# Patient Record
Sex: FEMALE | Race: WHITE | Employment: OTHER | URBAN - METROPOLITAN AREA
[De-identification: names, ages, dates, MRNs, and addresses within clinical notes are randomized per-mention and may not be internally consistent; named-entity substitution may affect disease eponyms.]

---

## 2017-01-02 LAB
ALBUMIN SERPL-MCNC: 2.8 G/DL
ALP BLD-CCNC: 223 U/L
ALT SERPL-CCNC: 21 U/L
AST SERPL-CCNC: 17 U/L
BASOPHILS ABSOLUTE: NORMAL /ΜL
BASOPHILS RELATIVE PERCENT: 0 %
BILIRUB SERPL-MCNC: 0.5 MG/DL (ref 0.1–1.4)
BUN BLDV-MCNC: 13 MG/DL
CALCIUM SERPL-MCNC: 8.6 MG/DL
CHLORIDE BLD-SCNC: 99 MMOL/L
CHOLESTEROL, TOTAL: 130 MG/DL
CHOLESTEROL/HDL RATIO: NORMAL
CO2: 27 MMOL/L
CREAT SERPL-MCNC: 0.52 MG/DL
EOSINOPHILS ABSOLUTE: NORMAL /ΜL
EOSINOPHILS RELATIVE PERCENT: 2 %
GFR CALCULATED: NORMAL
GLUCOSE BLD-MCNC: 168 MG/DL
HCT VFR BLD CALC: 43 % (ref 36–46)
HDLC SERPL-MCNC: NORMAL MG/DL (ref 35–70)
HEMOGLOBIN: 14 G/DL (ref 12–16)
LDL CHOLESTEROL CALCULATED: NORMAL MG/DL (ref 0–160)
LYMPHOCYTES ABSOLUTE: NORMAL /ΜL
LYMPHOCYTES RELATIVE PERCENT: 23 %
MCH RBC QN AUTO: 31 PG
MCHC RBC AUTO-ENTMCNC: 33 G/DL
MCV RBC AUTO: 93 FL
MONOCYTES ABSOLUTE: NORMAL /ΜL
MONOCYTES RELATIVE PERCENT: 6 %
NEUTROPHILS ABSOLUTE: NORMAL /ΜL
NEUTROPHILS RELATIVE PERCENT: NORMAL %
PDW BLD-RTO: 14.4 %
PLATELET # BLD: 271 K/ΜL
PMV BLD AUTO: NORMAL FL
POTASSIUM SERPL-SCNC: 4.6 MMOL/L
RBC # BLD: 4.58 10^6/ΜL
SODIUM BLD-SCNC: 136 MMOL/L
TOTAL PROTEIN: 7.1
TRIGL SERPL-MCNC: NORMAL MG/DL
VLDLC SERPL CALC-MCNC: NORMAL MG/DL
WBC # BLD: 6.1 10^3/ML

## 2017-01-04 DIAGNOSIS — D50.8 IRON DEFICIENCY ANEMIA DUE TO DIETARY CAUSES: ICD-10-CM

## 2017-01-04 DIAGNOSIS — G40.909 SEIZURE DISORDER (HCC): ICD-10-CM

## 2017-01-04 DIAGNOSIS — E78.00 PURE HYPERCHOLESTEROLEMIA: ICD-10-CM

## 2017-02-08 ENCOUNTER — TELEPHONE (OUTPATIENT)
Dept: INTERNAL MEDICINE CLINIC | Age: 62
End: 2017-02-08

## 2017-02-08 RX ORDER — AMOXICILLIN AND CLAVULANATE POTASSIUM 875; 125 MG/1; MG/1
1 TABLET, FILM COATED ORAL 2 TIMES DAILY WITH MEALS
Qty: 20 TABLET | Refills: 1 | Status: SHIPPED | OUTPATIENT
Start: 2017-02-08 | End: 2018-02-02 | Stop reason: SDUPTHER

## 2017-02-21 ENCOUNTER — OFFICE VISIT (OUTPATIENT)
Dept: INTERNAL MEDICINE CLINIC | Age: 62
End: 2017-02-21

## 2017-02-21 VITALS
SYSTOLIC BLOOD PRESSURE: 100 MMHG | HEART RATE: 68 BPM | DIASTOLIC BLOOD PRESSURE: 60 MMHG | WEIGHT: 120 LBS | HEIGHT: 64 IN | BODY MASS INDEX: 20.49 KG/M2 | OXYGEN SATURATION: 90 %

## 2017-02-21 DIAGNOSIS — Z23 NEED FOR PROPHYLACTIC VACCINATION AGAINST DIPHTHERIA-TETANUS-PERTUSSIS (DTP): Primary | ICD-10-CM

## 2017-02-21 DIAGNOSIS — Z79.899 LONG TERM USE OF DRUG: ICD-10-CM

## 2017-02-21 DIAGNOSIS — D50.8 IRON DEFICIENCY ANEMIA DUE TO DIETARY CAUSES: ICD-10-CM

## 2017-02-21 DIAGNOSIS — G89.4 CHRONIC PAIN SYNDROME: ICD-10-CM

## 2017-02-21 DIAGNOSIS — K21.9 GASTROESOPHAGEAL REFLUX DISEASE WITHOUT ESOPHAGITIS: ICD-10-CM

## 2017-02-21 DIAGNOSIS — R11.0 NAUSEA: ICD-10-CM

## 2017-02-21 DIAGNOSIS — M62.838 MUSCLE SPASMS OF BOTH LOWER EXTREMITIES: ICD-10-CM

## 2017-02-21 DIAGNOSIS — N39.0 RECURRENT UTI: ICD-10-CM

## 2017-02-21 DIAGNOSIS — G82.20 PARAPLEGIA (HCC): ICD-10-CM

## 2017-02-21 DIAGNOSIS — G47.09 OTHER INSOMNIA: ICD-10-CM

## 2017-02-21 DIAGNOSIS — G40.909 SEIZURE DISORDER (HCC): ICD-10-CM

## 2017-02-21 DIAGNOSIS — Z93.1 STATUS POST GASTROSTOMY (HCC): ICD-10-CM

## 2017-02-21 DIAGNOSIS — E78.00 PURE HYPERCHOLESTEROLEMIA: ICD-10-CM

## 2017-02-21 PROCEDURE — G8427 DOCREV CUR MEDS BY ELIG CLIN: HCPCS | Performed by: INTERNAL MEDICINE

## 2017-02-21 PROCEDURE — G8484 FLU IMMUNIZE NO ADMIN: HCPCS | Performed by: INTERNAL MEDICINE

## 2017-02-21 PROCEDURE — 3017F COLORECTAL CA SCREEN DOC REV: CPT | Performed by: INTERNAL MEDICINE

## 2017-02-21 PROCEDURE — 99214 OFFICE O/P EST MOD 30 MIN: CPT | Performed by: INTERNAL MEDICINE

## 2017-02-21 PROCEDURE — 4004F PT TOBACCO SCREEN RCVD TLK: CPT | Performed by: INTERNAL MEDICINE

## 2017-02-21 PROCEDURE — 3014F SCREEN MAMMO DOC REV: CPT | Performed by: INTERNAL MEDICINE

## 2017-02-21 PROCEDURE — G8420 CALC BMI NORM PARAMETERS: HCPCS | Performed by: INTERNAL MEDICINE

## 2017-02-21 RX ORDER — GABAPENTIN 600 MG/1
TABLET ORAL
Qty: 270 TABLET | Refills: 1 | Status: SHIPPED | OUTPATIENT
Start: 2017-02-21 | End: 2017-08-16 | Stop reason: SDUPTHER

## 2017-02-21 RX ORDER — ZOLPIDEM TARTRATE 10 MG/1
10 TABLET ORAL NIGHTLY
Qty: 90 TABLET | Refills: 0 | Status: SHIPPED | OUTPATIENT
Start: 2017-02-21 | End: 2017-08-16 | Stop reason: SDUPTHER

## 2017-02-21 RX ORDER — BACLOFEN 10 MG/1
10 TABLET ORAL 4 TIMES DAILY
Qty: 360 TABLET | Refills: 2 | Status: SHIPPED | OUTPATIENT
Start: 2017-02-21 | End: 2017-08-16 | Stop reason: SDUPTHER

## 2017-02-21 RX ORDER — PROMETHAZINE HYDROCHLORIDE 25 MG/1
25 TABLET ORAL EVERY 8 HOURS PRN
Qty: 270 TABLET | Refills: 1 | Status: SHIPPED | OUTPATIENT
Start: 2017-02-21 | End: 2017-08-16 | Stop reason: SDUPTHER

## 2017-02-21 RX ORDER — FERROUS SULFATE 325(65) MG
TABLET ORAL
Qty: 90 TABLET | Refills: 1 | Status: SHIPPED | OUTPATIENT
Start: 2017-02-21 | End: 2017-08-16 | Stop reason: SDUPTHER

## 2017-02-21 RX ORDER — OXYCODONE HYDROCHLORIDE 15 MG/1
1 TABLET, FILM COATED, EXTENDED RELEASE ORAL EVERY 12 HOURS
Qty: 180 TABLET | Refills: 0 | Status: SHIPPED | OUTPATIENT
Start: 2017-02-21 | End: 2017-05-15 | Stop reason: SDUPTHER

## 2017-02-21 RX ORDER — PRAVASTATIN SODIUM 20 MG
20 TABLET ORAL EVERY EVENING
Qty: 90 TABLET | Refills: 1 | Status: SHIPPED | OUTPATIENT
Start: 2017-02-21 | End: 2017-08-16 | Stop reason: SDUPTHER

## 2017-02-21 RX ORDER — RANITIDINE 150 MG/1
150 TABLET ORAL 2 TIMES DAILY
Qty: 180 TABLET | Refills: 1 | Status: SHIPPED | OUTPATIENT
Start: 2017-02-21 | End: 2017-08-16 | Stop reason: SDUPTHER

## 2017-02-21 RX ORDER — TIZANIDINE 4 MG/1
4 TABLET ORAL 2 TIMES DAILY
Qty: 60 TABLET | Refills: 2 | Status: SHIPPED | OUTPATIENT
Start: 2017-02-21 | End: 2017-06-28 | Stop reason: SDUPTHER

## 2017-02-21 RX ORDER — PHENYTOIN SODIUM 100 MG/1
CAPSULE, EXTENDED RELEASE ORAL
Qty: 270 CAPSULE | Refills: 3 | Status: SHIPPED | OUTPATIENT
Start: 2017-02-21 | End: 2018-02-14 | Stop reason: SDUPTHER

## 2017-02-21 ASSESSMENT — ENCOUNTER SYMPTOMS: BACK PAIN: 1

## 2017-02-25 LAB
6-ACETYLMORPHINE: NOT DETECTED
7-AMINOCLONAZEPAM: NOT DETECTED
ALPHA-OH-ALPRAZOLAM: NOT DETECTED
ALPRAZOLAM: NOT DETECTED
AMPHETAMINE: NOT DETECTED
BARBITURATES: NOT DETECTED
BENZOYLECGONINE: NOT DETECTED
BUPRENORPHINE: NOT DETECTED
CARISOPRODOL: NOT DETECTED
CLONAZEPAM: NOT DETECTED
CODEINE: NOT DETECTED
CREATININE URINE: 23 MG/DL (ref 20–400)
DIAZEPAM: NOT DETECTED
DRUGS EXPECTED: NORMAL
EER PAIN MGT DRUG PANEL, HIGH RES/EMIT U: NORMAL
ETHYL GLUCURONIDE: NOT DETECTED
FENTANYL: NOT DETECTED
HYDROCODONE: NOT DETECTED
HYDROMORPHONE: NOT DETECTED
LORAZEPAM: NOT DETECTED
MARIJUANA METABOLITE: PRESENT
MDA: NOT DETECTED
MDEA: NOT DETECTED
MDMA URINE: NOT DETECTED
MEPERIDINE: NOT DETECTED
METHADONE: NOT DETECTED
METHAMPHETAMINE: NOT DETECTED
METHYLPHENIDATE: NOT DETECTED
MIDAZOLAM: NOT DETECTED
MORPHINE: NOT DETECTED
NORBUPRENORPHINE, FREE: NOT DETECTED
NORDIAZEPAM: PRESENT
NORFENTANYL: NOT DETECTED
NORHYDROCODONE, URINE: NOT DETECTED
NOROXYCODONE: PRESENT
NOROXYMORPHONE, URINE: PRESENT
OXAZEPAM: PRESENT
OXYCODONE: PRESENT
OXYMORPHONE: NOT DETECTED
PAIN MANAGEMENT DRUG PANEL: NORMAL
PAIN MANAGEMENT DRUG PANEL: NORMAL
PCP: NOT DETECTED
PHENTERMINE: NOT DETECTED
PROPOXYPHENE: NOT DETECTED
TAPENTADOL, URINE: NOT DETECTED
TAPENTADOL-O-SULFATE, URINE: NOT DETECTED
TEMAZEPAM: PRESENT
TRAMADOL: NOT DETECTED
ZOLPIDEM: NOT DETECTED

## 2017-03-30 ENCOUNTER — TELEPHONE (OUTPATIENT)
Dept: INTERNAL MEDICINE CLINIC | Age: 62
End: 2017-03-30

## 2017-03-30 RX ORDER — AMOXICILLIN AND CLAVULANATE POTASSIUM 875; 125 MG/1; MG/1
1 TABLET, FILM COATED ORAL 2 TIMES DAILY WITH MEALS
Qty: 20 TABLET | Refills: 0 | Status: SHIPPED | OUTPATIENT
Start: 2017-03-30 | End: 2017-04-09

## 2017-05-15 ENCOUNTER — OFFICE VISIT (OUTPATIENT)
Dept: INTERNAL MEDICINE CLINIC | Age: 62
End: 2017-05-15

## 2017-05-15 VITALS
TEMPERATURE: 98.3 F | DIASTOLIC BLOOD PRESSURE: 60 MMHG | HEIGHT: 64 IN | RESPIRATION RATE: 14 BRPM | OXYGEN SATURATION: 96 % | SYSTOLIC BLOOD PRESSURE: 90 MMHG | HEART RATE: 68 BPM

## 2017-05-15 DIAGNOSIS — G40.909 SEIZURE DISORDER (HCC): ICD-10-CM

## 2017-05-15 DIAGNOSIS — E78.00 PURE HYPERCHOLESTEROLEMIA: ICD-10-CM

## 2017-05-15 DIAGNOSIS — F32.9 REACTIVE DEPRESSION: ICD-10-CM

## 2017-05-15 DIAGNOSIS — N39.0 RECURRENT UTI: Primary | ICD-10-CM

## 2017-05-15 DIAGNOSIS — F17.200 SMOKER: Chronic | ICD-10-CM

## 2017-05-15 DIAGNOSIS — N39.41 URGE INCONTINENCE OF URINE: ICD-10-CM

## 2017-05-15 DIAGNOSIS — G89.4 CHRONIC PAIN SYNDROME: ICD-10-CM

## 2017-05-15 DIAGNOSIS — C21.0 ANAL CARCINOMA (HCC): ICD-10-CM

## 2017-05-15 DIAGNOSIS — G82.20 PARAPLEGIA (HCC): ICD-10-CM

## 2017-05-15 DIAGNOSIS — K21.9 GASTROESOPHAGEAL REFLUX DISEASE WITHOUT ESOPHAGITIS: ICD-10-CM

## 2017-05-15 DIAGNOSIS — Z93.1 S/P PERCUTANEOUS ENDOSCOPIC GASTROSTOMY (PEG) TUBE PLACEMENT (HCC): ICD-10-CM

## 2017-05-15 PROCEDURE — 99214 OFFICE O/P EST MOD 30 MIN: CPT | Performed by: INTERNAL MEDICINE

## 2017-05-15 PROCEDURE — G8420 CALC BMI NORM PARAMETERS: HCPCS | Performed by: INTERNAL MEDICINE

## 2017-05-15 PROCEDURE — 3014F SCREEN MAMMO DOC REV: CPT | Performed by: INTERNAL MEDICINE

## 2017-05-15 PROCEDURE — 3017F COLORECTAL CA SCREEN DOC REV: CPT | Performed by: INTERNAL MEDICINE

## 2017-05-15 PROCEDURE — 4004F PT TOBACCO SCREEN RCVD TLK: CPT | Performed by: INTERNAL MEDICINE

## 2017-05-15 PROCEDURE — G8427 DOCREV CUR MEDS BY ELIG CLIN: HCPCS | Performed by: INTERNAL MEDICINE

## 2017-05-15 RX ORDER — OXYCODONE HYDROCHLORIDE 15 MG/1
1 TABLET, FILM COATED, EXTENDED RELEASE ORAL EVERY 12 HOURS
Qty: 180 TABLET | Refills: 0 | Status: SHIPPED | OUTPATIENT
Start: 2017-05-15 | End: 2017-08-16 | Stop reason: SDUPTHER

## 2017-05-15 RX ORDER — AMOXICILLIN AND CLAVULANATE POTASSIUM 875; 125 MG/1; MG/1
1 TABLET, FILM COATED ORAL 2 TIMES DAILY WITH MEALS
Qty: 20 TABLET | Refills: 1 | Status: SHIPPED | OUTPATIENT
Start: 2017-05-15 | End: 2017-08-16 | Stop reason: SDUPTHER

## 2017-05-15 ASSESSMENT — PATIENT HEALTH QUESTIONNAIRE - PHQ9
2. FEELING DOWN, DEPRESSED OR HOPELESS: 1
1. LITTLE INTEREST OR PLEASURE IN DOING THINGS: 1
SUM OF ALL RESPONSES TO PHQ9 QUESTIONS 1 & 2: 2
SUM OF ALL RESPONSES TO PHQ QUESTIONS 1-9: 2

## 2017-05-15 ASSESSMENT — ENCOUNTER SYMPTOMS
COUGH: 0
BACK PAIN: 1

## 2017-06-25 DIAGNOSIS — G89.4 CHRONIC PAIN SYNDROME: ICD-10-CM

## 2017-06-25 RX ORDER — TIZANIDINE 4 MG/1
TABLET ORAL
Qty: 60 TABLET | Refills: 1 | Status: CANCELLED | OUTPATIENT
Start: 2017-06-25

## 2017-06-28 DIAGNOSIS — G89.4 CHRONIC PAIN SYNDROME: ICD-10-CM

## 2017-06-30 RX ORDER — TIZANIDINE 4 MG/1
4 TABLET ORAL 2 TIMES DAILY
Qty: 60 TABLET | Refills: 2 | Status: SHIPPED | OUTPATIENT
Start: 2017-06-30 | End: 2017-08-16 | Stop reason: SDUPTHER

## 2017-08-01 ENCOUNTER — TELEPHONE (OUTPATIENT)
Dept: FAMILY MEDICINE CLINIC | Age: 62
End: 2017-08-01

## 2017-08-16 ENCOUNTER — OFFICE VISIT (OUTPATIENT)
Dept: FAMILY MEDICINE CLINIC | Age: 62
End: 2017-08-16

## 2017-08-16 VITALS
HEIGHT: 64 IN | SYSTOLIC BLOOD PRESSURE: 78 MMHG | RESPIRATION RATE: 16 BRPM | HEART RATE: 71 BPM | OXYGEN SATURATION: 98 % | DIASTOLIC BLOOD PRESSURE: 58 MMHG

## 2017-08-16 DIAGNOSIS — D50.8 IRON DEFICIENCY ANEMIA DUE TO DIETARY CAUSES: ICD-10-CM

## 2017-08-16 DIAGNOSIS — G82.20 PARAPLEGIA (HCC): ICD-10-CM

## 2017-08-16 DIAGNOSIS — N39.0 RECURRENT UTI: ICD-10-CM

## 2017-08-16 DIAGNOSIS — F51.01 PRIMARY INSOMNIA: ICD-10-CM

## 2017-08-16 DIAGNOSIS — K21.9 GASTROESOPHAGEAL REFLUX DISEASE WITHOUT ESOPHAGITIS: ICD-10-CM

## 2017-08-16 DIAGNOSIS — G89.4 CHRONIC PAIN SYNDROME: ICD-10-CM

## 2017-08-16 DIAGNOSIS — G40.909 SEIZURE DISORDER (HCC): Primary | ICD-10-CM

## 2017-08-16 DIAGNOSIS — M62.838 MUSCLE SPASMS OF BOTH LOWER EXTREMITIES: ICD-10-CM

## 2017-08-16 DIAGNOSIS — F17.200 SMOKER UNMOTIVATED TO QUIT: ICD-10-CM

## 2017-08-16 DIAGNOSIS — F32.A DEPRESSION, UNSPECIFIED DEPRESSION TYPE: ICD-10-CM

## 2017-08-16 DIAGNOSIS — R11.0 NAUSEA: ICD-10-CM

## 2017-08-16 DIAGNOSIS — E78.00 PURE HYPERCHOLESTEROLEMIA: ICD-10-CM

## 2017-08-16 PROCEDURE — 3014F SCREEN MAMMO DOC REV: CPT | Performed by: INTERNAL MEDICINE

## 2017-08-16 PROCEDURE — 3017F COLORECTAL CA SCREEN DOC REV: CPT | Performed by: INTERNAL MEDICINE

## 2017-08-16 PROCEDURE — G8420 CALC BMI NORM PARAMETERS: HCPCS | Performed by: INTERNAL MEDICINE

## 2017-08-16 PROCEDURE — 99214 OFFICE O/P EST MOD 30 MIN: CPT | Performed by: INTERNAL MEDICINE

## 2017-08-16 PROCEDURE — 4004F PT TOBACCO SCREEN RCVD TLK: CPT | Performed by: INTERNAL MEDICINE

## 2017-08-16 PROCEDURE — G8427 DOCREV CUR MEDS BY ELIG CLIN: HCPCS | Performed by: INTERNAL MEDICINE

## 2017-08-16 RX ORDER — PROMETHAZINE HYDROCHLORIDE 25 MG/1
25 TABLET ORAL EVERY 8 HOURS PRN
Qty: 270 TABLET | Refills: 1 | Status: SHIPPED | OUTPATIENT
Start: 2017-08-16 | End: 2018-02-14 | Stop reason: SDUPTHER

## 2017-08-16 RX ORDER — RANITIDINE 150 MG/1
150 TABLET ORAL 2 TIMES DAILY
Qty: 180 TABLET | Refills: 1 | Status: SHIPPED | OUTPATIENT
Start: 2017-08-16 | End: 2018-02-14 | Stop reason: SDUPTHER

## 2017-08-16 RX ORDER — OXYCODONE HYDROCHLORIDE 15 MG/1
1 TABLET, FILM COATED, EXTENDED RELEASE ORAL EVERY 12 HOURS
Qty: 180 TABLET | Refills: 0 | Status: SHIPPED | OUTPATIENT
Start: 2017-08-16 | End: 2017-08-16 | Stop reason: SDUPTHER

## 2017-08-16 RX ORDER — FOLIC ACID 1 MG/1
1 TABLET ORAL DAILY
Qty: 90 TABLET | Refills: 1 | Status: SHIPPED | OUTPATIENT
Start: 2017-08-16 | End: 2018-02-14 | Stop reason: SDUPTHER

## 2017-08-16 RX ORDER — BACLOFEN 10 MG/1
10 TABLET ORAL 4 TIMES DAILY
Qty: 360 TABLET | Refills: 2 | Status: SHIPPED | OUTPATIENT
Start: 2017-08-16 | End: 2018-01-01 | Stop reason: SDUPTHER

## 2017-08-16 RX ORDER — AMOXICILLIN AND CLAVULANATE POTASSIUM 875; 125 MG/1; MG/1
1 TABLET, FILM COATED ORAL 2 TIMES DAILY WITH MEALS
Qty: 20 TABLET | Refills: 2 | Status: SHIPPED | OUTPATIENT
Start: 2017-08-16 | End: 2017-08-26

## 2017-08-16 RX ORDER — OXYCODONE HYDROCHLORIDE 15 MG/1
1 TABLET, FILM COATED, EXTENDED RELEASE ORAL EVERY 12 HOURS
Qty: 180 TABLET | Refills: 0 | Status: SHIPPED | OUTPATIENT
Start: 2017-08-16 | End: 2017-11-15 | Stop reason: SDUPTHER

## 2017-08-16 RX ORDER — FERROUS SULFATE 325(65) MG
TABLET ORAL
Qty: 90 TABLET | Refills: 1 | Status: SHIPPED | OUTPATIENT
Start: 2017-08-16 | End: 2018-02-14 | Stop reason: SDUPTHER

## 2017-08-16 RX ORDER — GABAPENTIN 600 MG/1
TABLET ORAL
Qty: 270 TABLET | Refills: 0 | Status: SHIPPED | OUTPATIENT
Start: 2017-08-16 | End: 2017-11-15 | Stop reason: SDUPTHER

## 2017-08-16 RX ORDER — PRAVASTATIN SODIUM 20 MG
20 TABLET ORAL EVERY EVENING
Qty: 90 TABLET | Refills: 1 | Status: SHIPPED | OUTPATIENT
Start: 2017-08-16 | End: 2018-02-14 | Stop reason: SDUPTHER

## 2017-08-16 RX ORDER — TIZANIDINE 4 MG/1
4 TABLET ORAL 2 TIMES DAILY
Qty: 60 TABLET | Refills: 2 | Status: SHIPPED | OUTPATIENT
Start: 2017-08-16 | End: 2017-11-15 | Stop reason: SDUPTHER

## 2017-08-16 RX ORDER — FLUOXETINE HYDROCHLORIDE 40 MG/1
40 CAPSULE ORAL DAILY
Qty: 90 CAPSULE | Refills: 1 | Status: SHIPPED | OUTPATIENT
Start: 2017-08-16 | End: 2017-11-15

## 2017-08-16 RX ORDER — ZOLPIDEM TARTRATE 10 MG/1
10 TABLET ORAL NIGHTLY
Qty: 90 TABLET | Refills: 0 | Status: SHIPPED | OUTPATIENT
Start: 2017-08-16 | End: 2017-11-15 | Stop reason: SDUPTHER

## 2017-08-16 ASSESSMENT — ENCOUNTER SYMPTOMS
BACK PAIN: 1
COUGH: 0

## 2017-08-22 ENCOUNTER — TELEPHONE (OUTPATIENT)
Dept: FAMILY MEDICINE CLINIC | Age: 62
End: 2017-08-22

## 2017-09-19 ENCOUNTER — TELEPHONE (OUTPATIENT)
Dept: FAMILY MEDICINE CLINIC | Age: 62
End: 2017-09-19

## 2017-10-16 PROCEDURE — G0179 MD RECERTIFICATION HHA PT: HCPCS | Performed by: INTERNAL MEDICINE

## 2017-11-06 ENCOUNTER — TELEPHONE (OUTPATIENT)
Dept: FAMILY MEDICINE CLINIC | Age: 62
End: 2017-11-06

## 2017-11-06 DIAGNOSIS — S31.811D LACERATION OF RIGHT BUTTOCK WITHOUT FOREIGN BODY, SUBSEQUENT ENCOUNTER: Primary | ICD-10-CM

## 2017-11-09 ENCOUNTER — TELEPHONE (OUTPATIENT)
Dept: FAMILY MEDICINE CLINIC | Age: 62
End: 2017-11-09

## 2017-11-09 DIAGNOSIS — C21.0 ANAL CARCINOMA (HCC): ICD-10-CM

## 2017-11-09 DIAGNOSIS — Z93.1 S/P PERCUTANEOUS ENDOSCOPIC GASTROSTOMY (PEG) TUBE PLACEMENT (HCC): ICD-10-CM

## 2017-11-09 DIAGNOSIS — G82.20 PARAPLEGIA (HCC): Primary | ICD-10-CM

## 2017-11-09 DIAGNOSIS — G89.4 CHRONIC PAIN SYNDROME: ICD-10-CM

## 2017-11-09 DIAGNOSIS — G40.909 SEIZURE DISORDER (HCC): ICD-10-CM

## 2017-11-09 NOTE — TELEPHONE ENCOUNTER
Marco Antonio patient's spouse states patient needs new electric adjustable  hospital bed with air mattress. He would like a script printed out and he will  script.

## 2017-11-15 ENCOUNTER — OFFICE VISIT (OUTPATIENT)
Dept: FAMILY MEDICINE CLINIC | Age: 62
End: 2017-11-15

## 2017-11-15 VITALS
RESPIRATION RATE: 16 BRPM | SYSTOLIC BLOOD PRESSURE: 64 MMHG | HEART RATE: 68 BPM | TEMPERATURE: 97.8 F | HEIGHT: 64 IN | DIASTOLIC BLOOD PRESSURE: 42 MMHG | OXYGEN SATURATION: 96 %

## 2017-11-15 DIAGNOSIS — Z00.00 ROUTINE GENERAL MEDICAL EXAMINATION AT A HEALTH CARE FACILITY: Primary | ICD-10-CM

## 2017-11-15 DIAGNOSIS — G40.909 SEIZURE DISORDER (HCC): ICD-10-CM

## 2017-11-15 DIAGNOSIS — G82.20 PARAPLEGIA (HCC): ICD-10-CM

## 2017-11-15 DIAGNOSIS — Z23 NEED FOR INFLUENZA VACCINATION: ICD-10-CM

## 2017-11-15 DIAGNOSIS — F32.9 REACTIVE DEPRESSION: ICD-10-CM

## 2017-11-15 DIAGNOSIS — M62.838 MUSCLE SPASMS OF BOTH LOWER EXTREMITIES: ICD-10-CM

## 2017-11-15 DIAGNOSIS — N39.42 URINARY INCONTINENCE WITHOUT SENSORY AWARENESS: ICD-10-CM

## 2017-11-15 DIAGNOSIS — F39 MOOD DISORDER (HCC): ICD-10-CM

## 2017-11-15 DIAGNOSIS — Z87.891 PERSONAL HISTORY OF TOBACCO USE: ICD-10-CM

## 2017-11-15 DIAGNOSIS — N39.0 RECURRENT UTI: ICD-10-CM

## 2017-11-15 DIAGNOSIS — F17.210 NICOTINE DEPENDENCE, CIGARETTES, UNCOMPLICATED: ICD-10-CM

## 2017-11-15 DIAGNOSIS — G89.4 CHRONIC PAIN SYNDROME: ICD-10-CM

## 2017-11-15 DIAGNOSIS — F31.0 BIPOLAR AFFECTIVE DISORDER, CURRENT EPISODE HYPOMANIC (HCC): ICD-10-CM

## 2017-11-15 DIAGNOSIS — K21.9 GASTROESOPHAGEAL REFLUX DISEASE WITHOUT ESOPHAGITIS: ICD-10-CM

## 2017-11-15 DIAGNOSIS — F51.01 PRIMARY INSOMNIA: ICD-10-CM

## 2017-11-15 DIAGNOSIS — C21.0 ANAL CARCINOMA (HCC): ICD-10-CM

## 2017-11-15 DIAGNOSIS — Z93.1 S/P PERCUTANEOUS ENDOSCOPIC GASTROSTOMY (PEG) TUBE PLACEMENT (HCC): ICD-10-CM

## 2017-11-15 PROCEDURE — 90686 IIV4 VACC NO PRSV 0.5 ML IM: CPT | Performed by: INTERNAL MEDICINE

## 2017-11-15 PROCEDURE — G0444 DEPRESSION SCREEN ANNUAL: HCPCS | Performed by: INTERNAL MEDICINE

## 2017-11-15 PROCEDURE — G0008 ADMIN INFLUENZA VIRUS VAC: HCPCS | Performed by: INTERNAL MEDICINE

## 2017-11-15 PROCEDURE — G0439 PPPS, SUBSEQ VISIT: HCPCS | Performed by: INTERNAL MEDICINE

## 2017-11-15 RX ORDER — TIZANIDINE 4 MG/1
4 TABLET ORAL 2 TIMES DAILY
Qty: 180 TABLET | Refills: 0 | Status: SHIPPED | OUTPATIENT
Start: 2017-11-15 | End: 2018-02-14 | Stop reason: SDUPTHER

## 2017-11-15 RX ORDER — GABAPENTIN 600 MG/1
TABLET ORAL
Qty: 270 TABLET | Refills: 0 | Status: SHIPPED | OUTPATIENT
Start: 2017-11-15 | End: 2018-02-14 | Stop reason: SDUPTHER

## 2017-11-15 RX ORDER — ZOLPIDEM TARTRATE 10 MG/1
10 TABLET ORAL NIGHTLY
Qty: 90 TABLET | Refills: 0 | Status: SHIPPED | OUTPATIENT
Start: 2017-11-15 | End: 2018-02-14 | Stop reason: SDUPTHER

## 2017-11-15 RX ORDER — BACLOFEN 10 MG/1
10 TABLET ORAL 4 TIMES DAILY
Qty: 360 TABLET | Refills: 2 | Status: CANCELLED | OUTPATIENT
Start: 2017-11-15

## 2017-11-15 RX ORDER — AMOXICILLIN AND CLAVULANATE POTASSIUM 875; 125 MG/1; MG/1
1 TABLET, FILM COATED ORAL 2 TIMES DAILY WITH MEALS
Qty: 20 TABLET | Refills: 2 | Status: SHIPPED | OUTPATIENT
Start: 2017-11-15 | End: 2017-11-25

## 2017-11-15 RX ORDER — OXYCODONE HYDROCHLORIDE 15 MG/1
1 TABLET, FILM COATED, EXTENDED RELEASE ORAL EVERY 12 HOURS
Qty: 180 TABLET | Refills: 0 | Status: SHIPPED | OUTPATIENT
Start: 2017-11-15 | End: 2018-02-14 | Stop reason: SDUPTHER

## 2017-11-15 RX ORDER — PHENYTOIN SODIUM 100 MG/1
CAPSULE, EXTENDED RELEASE ORAL
Qty: 270 CAPSULE | Refills: 3 | Status: CANCELLED | OUTPATIENT
Start: 2017-11-15

## 2017-11-15 ASSESSMENT — ANXIETY QUESTIONNAIRES: GAD7 TOTAL SCORE: 3

## 2017-11-15 ASSESSMENT — LIFESTYLE VARIABLES
HOW OFTEN DURING THE LAST YEAR HAVE YOU FAILED TO DO WHAT WAS NORMALLY EXPECTED FROM YOU BECAUSE OF DRINKING: 0
AUDIT TOTAL SCORE: 2
HOW OFTEN DO YOU HAVE A DRINK CONTAINING ALCOHOL: 1
HOW OFTEN DO YOU HAVE SIX OR MORE DRINKS ON ONE OCCASION: 0
HOW MANY STANDARD DRINKS CONTAINING ALCOHOL DO YOU HAVE ON A TYPICAL DAY: 1
HAS A RELATIVE, FRIEND, DOCTOR, OR ANOTHER HEALTH PROFESSIONAL EXPRESSED CONCERN ABOUT YOUR DRINKING OR SUGGESTED YOU CUT DOWN: 0
HOW OFTEN DURING THE LAST YEAR HAVE YOU HAD A FEELING OF GUILT OR REMORSE AFTER DRINKING: 0
AUDIT-C TOTAL SCORE: 2
HOW OFTEN DURING THE LAST YEAR HAVE YOU BEEN UNABLE TO REMEMBER WHAT HAPPENED THE NIGHT BEFORE BECAUSE YOU HAD BEEN DRINKING: 0
HAVE YOU OR SOMEONE ELSE BEEN INJURED AS A RESULT OF YOUR DRINKING: 0
HOW OFTEN DURING THE LAST YEAR HAVE YOU NEEDED AN ALCOHOLIC DRINK FIRST THING IN THE MORNING TO GET YOURSELF GOING AFTER A NIGHT OF HEAVY DRINKING: 0
HOW OFTEN DURING THE LAST YEAR HAVE YOU FOUND THAT YOU WERE NOT ABLE TO STOP DRINKING ONCE YOU HAD STARTED: 0

## 2017-11-15 NOTE — PATIENT INSTRUCTIONS
Advance Directives: Care Instructions  Your Care Instructions  An advance directive is a legal way to state your wishes at the end of your life. It tells your family and your doctor what to do if you can no longer say what you want. There are two main types of advance directives. You can change them any time that your wishes change. · A living will tells your family and your doctor your wishes about life support and other treatment. · A durable power of  for health care lets you name a person to make treatment decisions for you when you can't speak for yourself. This person is called a health care agent. If you do not have an advance directive, decisions about your medical care may be made by a doctor or a  who doesn't know you. It may help to think of an advance directive as a gift to the people who care for you. If you have one, they won't have to make tough decisions by themselves. Follow-up care is a key part of your treatment and safety. Be sure to make and go to all appointments, and call your doctor if you are having problems. It's also a good idea to know your test results and keep a list of the medicines you take. How can you care for yourself at home? · Discuss your wishes with your loved ones and your doctor. This way, there are no surprises. · Many states have a unique form. Or you might use a universal form that has been approved by many states. This kind of form can sometimes be completed and stored online. Your electronic copy will then be available wherever you have a connection to the Internet. In most cases, doctors will respect your wishes even if you have a form from a different state. · You don't need a  to do an advance directive. But you may want to get legal advice. · Think about these questions when you prepare an advance directive:  ¨ Who do you want to make decisions about your medical care if you are not able to?  Many people choose a family member or close friend. ¨ Do you know enough about life support methods that might be used? If not, talk to your doctor so you understand. ¨ What are you most afraid of that might happen? You might be afraid of having pain, losing your independence, or being kept alive by machines. ¨ Where would you prefer to die? Choices include your home, a hospital, or a nursing home. ¨ Would you like to have information about hospice care to support you and your family? ¨ Do you want to donate organs when you die? ¨ Do you want certain Gnosticist practices performed before you die? If so, put your wishes in the advance directive. · Read your advance directive every year, and make changes as needed. When should you call for help? Be sure to contact your doctor if you have any questions. Where can you learn more? Go to [email or web address] and sign in to your [employer] account. Enter R264 in the MadeClose box to learn more about \"Advance Directives: Care Instructions. \"     If you do not have an account, please click on the \"Sign Up Now\" link. Current as of: November 17, 2016  Content Version: 11.3  © 0862-9379 [employer]. Care instructions adapted under license by [facility] ([facility]). If you have questions about a medical condition or this instruction, always ask your healthcare professional. [address] any warranty or liability for your use of this information. Learning About Durable Power of  for Health Care  What is a durable power of  for health care? A durable power of  for health care is one type of the legal forms called advance directives. It lets you decide who you want to make treatment decisions for you if you cannot speak or decide for yourself. The person you choose is called your health care agent. Another type of advance directive is a living will.  It lets you write down what kinds of treatment or life support you want or do not want. What should you think about when choosing a health care agent? Choose your health care agent carefully. This person may or may not be a family member. Talk to the person before you make your final decision. Make sure he or she is comfortable with this responsibility. It's a good idea to choose someone who:  · Is at least 25years old. · Knows you well and understands what makes life meaningful for you. · Understands your Sabianist and moral values. · Will do what you want, not what he or she wants. · Will be able to make difficult choices at a stressful time. · Will be able to refuse or stop treatment, if that is what you would want, even if you could die. · Will be firm and confident with health professionals if needed. · Will ask questions to get necessary information. · Lives near you or agrees to travel to you if needed. Your family may help you make medical decisions while you can still be part of that process. But it is important to choose one person to be your health care agent in case you are not able to make decisions for yourself. If you don't fill out the legal form and name a health care agent, the decisions your family can make may be limited. Who will make decisions for you if you do not have a health care agent? If you don't have a health care agent or a living will, your family members may disagree about your medical care. And then some medical professionals who may not know you as well might have to make decisions for you. In some cases, a  makes the decisions. When you name a health care agent, it is very clear who has the power to make health decisions for you. How do you name a health care agent? You name your health care agent on a legal form. It is usually called a durable power of  for health care. Ask your hospital, state bar association, or office on aging where to find these forms.   You must sign the form to make it legal. Some states require There are many tools that people use to quit smoking. You may find that combining tools works best for you. There are several steps to quitting. First you get ready to quit. Then you get support to help you. After that, you learn new skills and behaviors to become a nonsmoker. For many people, a necessary step is getting and using medicine. Your doctor will help you set up the plan that best meets your needs. You may want to attend a smoking cessation program to help you quit smoking. When you choose a program, look for one that has proven success. Ask your doctor for ideas. You will greatly increase your chances of success if you take medicine as well as get counseling or join a cessation program.  Some of the changes you feel when you first quit tobacco are uncomfortable. Your body will miss the nicotine at first, and you may feel short-tempered and grumpy. You may have trouble sleeping or concentrating. Medicine can help you deal with these symptoms. You may struggle with changing your smoking habits and rituals. The last step is the tricky one: Be prepared for the smoking urge to continue for a time. This is a lot to deal with, but keep at it. You will feel better. Follow-up care is a key part of your treatment and safety. Be sure to make and go to all appointments, and call your doctor if you are having problems. Its also a good idea to know your test results and keep a list of the medicines you take. How can you care for yourself at home? · Ask your family, friends, and coworkers for support. You have a better chance of quitting if you have help and support. · Join a support group, such as Nicotine Anonymous, for people who are trying to quit smoking. · Consider signing up for a smoking cessation program, such as the American Lung Association's Freedom from Smoking program.  · Set a quit date. Pick your date carefully so that it is not right in the middle of a big deadline or stressful time.  Once you requirement with diet alone, but a vitamin D supplement is usually necessary to meet this goal.  · When exposed to the sun, use a sunscreen that protects against both UVA and UVB radiation with an SPF of 30 or greater. Reapply every 2 to 3 hours or after sweating, drying off with a towel, or swimming. · Always wear a seat belt when traveling in a car. Always wear a helmet when riding a bicycle or motorcycle. Patient Education        Well Visit, Women 48 to 72: Care Instructions  Your Care Instructions  Physical exams can help you stay healthy. Your doctor has checked your overall health and may have suggested ways to take good care of yourself. He or she also may have recommended tests. At home, you can help prevent illness with healthy eating, regular exercise, and other steps. Follow-up care is a key part of your treatment and safety. Be sure to make and go to all appointments, and call your doctor if you are having problems. It's also a good idea to know your test results and keep a list of the medicines you take. How can you care for yourself at home? Reach and stay at a healthy weight. This will lower your risk for many problems, such as obesity, diabetes, heart disease, and high blood pressure. Get at least 30 minutes of exercise on most days of the week. Walking is a good choice. You also may want to do other activities, such as running, swimming, cycling, or playing tennis or team sports. Do not smoke. Smoking can make health problems worse. If you need help quitting, talk to your doctor about stop-smoking programs and medicines. These can increase your chances of quitting for good. Protect your skin from too much sun. When you're outdoors from 10 a.m. to 4 p.m., stay in the shade or cover up with clothing and a hat with a wide brim. Wear sunglasses that block UV rays. Even when it's cloudy, put broad-spectrum sunscreen (SPF 30 or higher) on any exposed skin.   See a dentist one or two times a year
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
## 2017-12-28 ENCOUNTER — TELEPHONE (OUTPATIENT)
Dept: FAMILY MEDICINE CLINIC | Age: 62
End: 2017-12-28
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
## 2018-01-01 ENCOUNTER — OFFICE VISIT (OUTPATIENT)
Dept: FAMILY MEDICINE CLINIC | Age: 63
End: 2018-01-01
Payer: MEDICARE

## 2018-01-01 ENCOUNTER — TELEPHONE (OUTPATIENT)
Dept: FAMILY MEDICINE CLINIC | Age: 63
End: 2018-01-01

## 2018-01-01 VITALS
BODY MASS INDEX: 18.44 KG/M2 | OXYGEN SATURATION: 99 % | HEART RATE: 76 BPM | HEIGHT: 64 IN | SYSTOLIC BLOOD PRESSURE: 108 MMHG | WEIGHT: 108 LBS | DIASTOLIC BLOOD PRESSURE: 64 MMHG

## 2018-01-01 DIAGNOSIS — F51.01 PRIMARY INSOMNIA: ICD-10-CM

## 2018-01-01 DIAGNOSIS — N39.0 RECURRENT UTI: Primary | ICD-10-CM

## 2018-01-01 DIAGNOSIS — J20.9 ACUTE BRONCHITIS, UNSPECIFIED ORGANISM: Primary | ICD-10-CM

## 2018-01-01 DIAGNOSIS — M62.838 MUSCLE SPASM: ICD-10-CM

## 2018-01-01 DIAGNOSIS — Z23 FLU VACCINE NEED: ICD-10-CM

## 2018-01-01 DIAGNOSIS — E78.00 PURE HYPERCHOLESTEROLEMIA: ICD-10-CM

## 2018-01-01 DIAGNOSIS — G40.909 SEIZURE DISORDER (HCC): ICD-10-CM

## 2018-01-01 DIAGNOSIS — M62.838 MUSCLE SPASMS OF BOTH LOWER EXTREMITIES: ICD-10-CM

## 2018-01-01 DIAGNOSIS — G89.4 CHRONIC PAIN SYNDROME: ICD-10-CM

## 2018-01-01 DIAGNOSIS — R11.0 NAUSEA: ICD-10-CM

## 2018-01-01 PROCEDURE — 90662 IIV NO PRSV INCREASED AG IM: CPT | Performed by: INTERNAL MEDICINE

## 2018-01-01 PROCEDURE — 1101F PT FALLS ASSESS-DOCD LE1/YR: CPT | Performed by: INTERNAL MEDICINE

## 2018-01-01 PROCEDURE — 4040F PNEUMOC VAC/ADMIN/RCVD: CPT | Performed by: INTERNAL MEDICINE

## 2018-01-01 PROCEDURE — G8400 PT W/DXA NO RESULTS DOC: HCPCS | Performed by: INTERNAL MEDICINE

## 2018-01-01 PROCEDURE — G8420 CALC BMI NORM PARAMETERS: HCPCS | Performed by: INTERNAL MEDICINE

## 2018-01-01 PROCEDURE — 1090F PRES/ABSN URINE INCON ASSESS: CPT | Performed by: INTERNAL MEDICINE

## 2018-01-01 PROCEDURE — G0008 ADMIN INFLUENZA VIRUS VAC: HCPCS | Performed by: INTERNAL MEDICINE

## 2018-01-01 PROCEDURE — G8482 FLU IMMUNIZE ORDER/ADMIN: HCPCS | Performed by: INTERNAL MEDICINE

## 2018-01-01 PROCEDURE — 1123F ACP DISCUSS/DSCN MKR DOCD: CPT | Performed by: INTERNAL MEDICINE

## 2018-01-01 PROCEDURE — 4004F PT TOBACCO SCREEN RCVD TLK: CPT | Performed by: INTERNAL MEDICINE

## 2018-01-01 PROCEDURE — 99214 OFFICE O/P EST MOD 30 MIN: CPT | Performed by: INTERNAL MEDICINE

## 2018-01-01 PROCEDURE — G8427 DOCREV CUR MEDS BY ELIG CLIN: HCPCS | Performed by: INTERNAL MEDICINE

## 2018-01-01 PROCEDURE — 3017F COLORECTAL CA SCREEN DOC REV: CPT | Performed by: INTERNAL MEDICINE

## 2018-01-01 RX ORDER — ZOLPIDEM TARTRATE 10 MG/1
TABLET ORAL
Qty: 30 TABLET | Refills: 0 | Status: SHIPPED | OUTPATIENT
Start: 2018-01-01 | End: 2019-01-01

## 2018-01-01 RX ORDER — ZOLPIDEM TARTRATE 10 MG/1
TABLET ORAL
Qty: 30 TABLET | Refills: 0 | Status: SHIPPED | OUTPATIENT
Start: 2018-01-01 | End: 2018-01-01 | Stop reason: SDUPTHER

## 2018-01-01 RX ORDER — TIZANIDINE 4 MG/1
4 TABLET ORAL 2 TIMES DAILY
Qty: 180 TABLET | Refills: 1 | Status: SHIPPED | OUTPATIENT
Start: 2018-01-01 | End: 2019-01-01 | Stop reason: SDUPTHER

## 2018-01-01 RX ORDER — ZOLPIDEM TARTRATE 10 MG/1
10 TABLET ORAL NIGHTLY
Qty: 90 TABLET | Refills: 0 | OUTPATIENT
Start: 2018-01-01 | End: 2019-01-01

## 2018-01-01 RX ORDER — BACLOFEN 10 MG/1
TABLET ORAL
Qty: 120 TABLET | Refills: 0 | Status: SHIPPED | OUTPATIENT
Start: 2018-01-01 | End: 2019-01-01 | Stop reason: DRUGHIGH

## 2018-01-01 RX ORDER — AMOXICILLIN AND CLAVULANATE POTASSIUM 875; 125 MG/1; MG/1
1 TABLET, FILM COATED ORAL 2 TIMES DAILY WITH MEALS
Qty: 20 TABLET | Refills: 2 | Status: SHIPPED | OUTPATIENT
Start: 2018-01-01 | End: 2018-01-01

## 2018-01-01 RX ORDER — PROMETHAZINE HYDROCHLORIDE 25 MG/1
TABLET ORAL
Qty: 270 TABLET | Refills: 0 | Status: ON HOLD | OUTPATIENT
Start: 2018-01-01 | End: 2019-01-01 | Stop reason: SDUPTHER

## 2018-01-01 RX ORDER — PROMETHAZINE HYDROCHLORIDE 25 MG/1
TABLET ORAL
Qty: 270 TABLET | Refills: 0 | Status: SHIPPED | OUTPATIENT
Start: 2018-01-01 | End: 2018-01-01 | Stop reason: SDUPTHER

## 2018-01-01 RX ORDER — GABAPENTIN 600 MG/1
TABLET ORAL
Qty: 270 TABLET | Refills: 0 | Status: SHIPPED | OUTPATIENT
Start: 2018-01-01 | End: 2019-01-01 | Stop reason: SDUPTHER

## 2018-01-01 RX ORDER — BACLOFEN 10 MG/1
10 TABLET ORAL 3 TIMES DAILY
Qty: 270 TABLET | Refills: 1 | Status: SHIPPED | OUTPATIENT
Start: 2018-01-01 | End: 2019-01-01 | Stop reason: SDUPTHER

## 2018-01-01 RX ORDER — OXYCODONE HYDROCHLORIDE 15 MG/1
1 TABLET, FILM COATED, EXTENDED RELEASE ORAL EVERY 12 HOURS
Qty: 180 TABLET | Refills: 0 | Status: SHIPPED | OUTPATIENT
Start: 2018-01-01 | End: 2019-01-01 | Stop reason: SDUPTHER

## 2018-01-01 ASSESSMENT — ENCOUNTER SYMPTOMS
BACK PAIN: 1
COUGH: 0

## 2018-01-10 ENCOUNTER — TELEPHONE (OUTPATIENT)
Dept: FAMILY MEDICINE CLINIC | Age: 63
End: 2018-01-10

## 2018-02-02 RX ORDER — AMOXICILLIN AND CLAVULANATE POTASSIUM 875; 125 MG/1; MG/1
TABLET, FILM COATED ORAL
Qty: 20 TABLET | Refills: 0 | Status: SHIPPED | OUTPATIENT
Start: 2018-02-02 | End: 2018-02-14 | Stop reason: ALTCHOICE

## 2018-02-14 ENCOUNTER — TELEPHONE (OUTPATIENT)
Dept: FAMILY MEDICINE CLINIC | Age: 63
End: 2018-02-14

## 2018-02-14 ENCOUNTER — OFFICE VISIT (OUTPATIENT)
Dept: FAMILY MEDICINE CLINIC | Age: 63
End: 2018-02-14

## 2018-02-14 VITALS
DIASTOLIC BLOOD PRESSURE: 64 MMHG | HEART RATE: 76 BPM | OXYGEN SATURATION: 93 % | SYSTOLIC BLOOD PRESSURE: 98 MMHG | RESPIRATION RATE: 16 BRPM | HEIGHT: 64 IN

## 2018-02-14 DIAGNOSIS — K21.9 GASTROESOPHAGEAL REFLUX DISEASE WITHOUT ESOPHAGITIS: ICD-10-CM

## 2018-02-14 DIAGNOSIS — R11.0 NAUSEA: ICD-10-CM

## 2018-02-14 DIAGNOSIS — G40.919 INTRACTABLE EPILEPSY WITHOUT STATUS EPILEPTICUS, UNSPECIFIED EPILEPSY TYPE (HCC): ICD-10-CM

## 2018-02-14 DIAGNOSIS — Z02.83 ENCOUNTER FOR DRUG SCREENING: ICD-10-CM

## 2018-02-14 DIAGNOSIS — E78.00 PURE HYPERCHOLESTEROLEMIA: ICD-10-CM

## 2018-02-14 DIAGNOSIS — Z93.1 STATUS POST GASTROSTOMY (HCC): ICD-10-CM

## 2018-02-14 DIAGNOSIS — F33.41 RECURRENT MAJOR DEPRESSIVE DISORDER, IN PARTIAL REMISSION (HCC): ICD-10-CM

## 2018-02-14 DIAGNOSIS — F32.A ANXIETY AND DEPRESSION: ICD-10-CM

## 2018-02-14 DIAGNOSIS — G40.909 SEIZURE DISORDER (HCC): ICD-10-CM

## 2018-02-14 DIAGNOSIS — G82.20 PARAPLEGIA (HCC): ICD-10-CM

## 2018-02-14 DIAGNOSIS — F51.01 PRIMARY INSOMNIA: ICD-10-CM

## 2018-02-14 DIAGNOSIS — F41.9 ANXIETY AND DEPRESSION: ICD-10-CM

## 2018-02-14 DIAGNOSIS — G89.4 CHRONIC PAIN SYNDROME: ICD-10-CM

## 2018-02-14 DIAGNOSIS — N39.0 RECURRENT UTI: Primary | ICD-10-CM

## 2018-02-14 DIAGNOSIS — D50.8 IRON DEFICIENCY ANEMIA DUE TO DIETARY CAUSES: ICD-10-CM

## 2018-02-14 PROCEDURE — 4004F PT TOBACCO SCREEN RCVD TLK: CPT | Performed by: INTERNAL MEDICINE

## 2018-02-14 PROCEDURE — G8484 FLU IMMUNIZE NO ADMIN: HCPCS | Performed by: INTERNAL MEDICINE

## 2018-02-14 PROCEDURE — 3014F SCREEN MAMMO DOC REV: CPT | Performed by: INTERNAL MEDICINE

## 2018-02-14 PROCEDURE — G8421 BMI NOT CALCULATED: HCPCS | Performed by: INTERNAL MEDICINE

## 2018-02-14 PROCEDURE — G8427 DOCREV CUR MEDS BY ELIG CLIN: HCPCS | Performed by: INTERNAL MEDICINE

## 2018-02-14 PROCEDURE — 99214 OFFICE O/P EST MOD 30 MIN: CPT | Performed by: INTERNAL MEDICINE

## 2018-02-14 PROCEDURE — 3017F COLORECTAL CA SCREEN DOC REV: CPT | Performed by: INTERNAL MEDICINE

## 2018-02-14 RX ORDER — PHENYTOIN SODIUM 100 MG/1
CAPSULE, EXTENDED RELEASE ORAL
Qty: 270 CAPSULE | Refills: 3 | Status: SHIPPED | OUTPATIENT
Start: 2018-02-14 | End: 2019-01-01 | Stop reason: SDUPTHER

## 2018-02-14 RX ORDER — PROMETHAZINE HYDROCHLORIDE 25 MG/1
25 TABLET ORAL EVERY 8 HOURS PRN
Qty: 270 TABLET | Refills: 1 | Status: SHIPPED | OUTPATIENT
Start: 2018-02-14 | End: 2018-01-01 | Stop reason: SDUPTHER

## 2018-02-14 RX ORDER — RANITIDINE 150 MG/1
150 TABLET ORAL 2 TIMES DAILY
Qty: 180 TABLET | Refills: 1 | Status: SHIPPED | OUTPATIENT
Start: 2018-02-14 | End: 2019-01-01 | Stop reason: CLARIF

## 2018-02-14 RX ORDER — TIZANIDINE 4 MG/1
4 TABLET ORAL 2 TIMES DAILY
Qty: 180 TABLET | Refills: 1 | Status: SHIPPED | OUTPATIENT
Start: 2018-02-14 | End: 2018-01-01 | Stop reason: SDUPTHER

## 2018-02-14 RX ORDER — FOLIC ACID 1 MG/1
1 TABLET ORAL DAILY
Qty: 90 TABLET | Refills: 3 | Status: SHIPPED | OUTPATIENT
Start: 2018-02-14 | End: 2019-01-01 | Stop reason: SDUPTHER

## 2018-02-14 RX ORDER — AMOXICILLIN AND CLAVULANATE POTASSIUM 875; 125 MG/1; MG/1
1 TABLET, FILM COATED ORAL 2 TIMES DAILY WITH MEALS
Qty: 20 TABLET | Refills: 0 | Status: SHIPPED | OUTPATIENT
Start: 2018-02-14 | End: 2018-03-17 | Stop reason: SDUPTHER

## 2018-02-14 RX ORDER — PRAVASTATIN SODIUM 20 MG
20 TABLET ORAL EVERY EVENING
Qty: 90 TABLET | Refills: 1 | Status: SHIPPED | OUTPATIENT
Start: 2018-02-14 | End: 2018-01-01 | Stop reason: SDUPTHER

## 2018-02-14 RX ORDER — GABAPENTIN 600 MG/1
TABLET ORAL
Qty: 270 TABLET | Refills: 0 | Status: SHIPPED | OUTPATIENT
Start: 2018-02-14 | End: 2018-05-10 | Stop reason: SDUPTHER

## 2018-02-14 RX ORDER — OXYCODONE HYDROCHLORIDE 15 MG/1
1 TABLET, FILM COATED, EXTENDED RELEASE ORAL EVERY 12 HOURS
Qty: 180 TABLET | Refills: 0 | Status: SHIPPED | OUTPATIENT
Start: 2018-02-14 | End: 2018-05-10 | Stop reason: SDUPTHER

## 2018-02-14 RX ORDER — FERROUS SULFATE 325(65) MG
TABLET ORAL
Qty: 90 TABLET | Refills: 3 | Status: SHIPPED | OUTPATIENT
Start: 2018-02-14 | End: 2019-01-01 | Stop reason: CLARIF

## 2018-02-14 RX ORDER — ZOLPIDEM TARTRATE 10 MG/1
10 TABLET ORAL NIGHTLY
Qty: 90 TABLET | Refills: 0 | Status: SHIPPED | OUTPATIENT
Start: 2018-02-14 | End: 2018-05-10 | Stop reason: SDUPTHER

## 2018-02-14 NOTE — PATIENT INSTRUCTIONS
Patient Education        Urinary Tract Infection in Women: Care Instructions  Your Care Instructions    A urinary tract infection, or UTI, is a general term for an infection anywhere between the kidneys and the urethra (where urine comes out). Most UTIs are bladder infections. They often cause pain or burning when you urinate. UTIs are caused by bacteria and can be cured with antibiotics. Be sure to complete your treatment so that the infection goes away. Follow-up care is a key part of your treatment and safety. Be sure to make and go to all appointments, and call your doctor if you are having problems. It's also a good idea to know your test results and keep a list of the medicines you take. How can you care for yourself at home? · Take your antibiotics as directed. Do not stop taking them just because you feel better. You need to take the full course of antibiotics. · Drink extra water and other fluids for the next day or two. This may help wash out the bacteria that are causing the infection. (If you have kidney, heart, or liver disease and have to limit fluids, talk with your doctor before you increase your fluid intake.)  · Avoid drinks that are carbonated or have caffeine. They can irritate the bladder. · Urinate often. Try to empty your bladder each time. · To relieve pain, take a hot bath or lay a heating pad set on low over your lower belly or genital area. Never go to sleep with a heating pad in place. To prevent UTIs  · Drink plenty of water each day. This helps you urinate often, which clears bacteria from your system. (If you have kidney, heart, or liver disease and have to limit fluids, talk with your doctor before you increase your fluid intake.)  · Urinate when you need to. · Urinate right after you have sex. · Change sanitary pads often. · Avoid douches, bubble baths, feminine hygiene sprays, and other feminine hygiene products that have deodorants.   · After going to the bathroom, wipe from front to back. When should you call for help? Call your doctor now or seek immediate medical care if:  ? · Symptoms such as fever, chills, nausea, or vomiting get worse or appear for the first time. ? · You have new pain in your back just below your rib cage. This is called flank pain. ? · There is new blood or pus in your urine. ? · You have any problems with your antibiotic medicine. ? Watch closely for changes in your health, and be sure to contact your doctor if:  ? · You are not getting better after taking an antibiotic for 2 days. ? · Your symptoms go away but then come back. Where can you learn more? Go to https://Blackstar AmplificationpeNitronexeb.Taomee. org and sign in to your AdLemons account. Enter G616 in the Bridgeline Digital box to learn more about \"Urinary Tract Infection in Women: Care Instructions. \"     If you do not have an account, please click on the \"Sign Up Now\" link. Current as of: May 12, 2017  Content Version: 11.5  © 7128-3649 Lodgeo. Care instructions adapted under license by [facility] ([facility]). If you have questions about a medical condition or this instruction, always ask your healthcare professional. [place] [address] any warranty or liability for your use of this information. Patient Education        Urinary Tract Infection in Women: Care Instructions  Your Care Instructions    A urinary tract infection, or UTI, is a general term for an infection anywhere between the kidneys and the urethra (where urine comes out). Most UTIs are bladder infections. They often cause pain or burning when you urinate. UTIs are caused by bacteria and can be cured with antibiotics. Be sure to complete your treatment so that the infection goes away. Follow-up care is a key part of your treatment and safety. Be sure to make and go to all appointments, and call your doctor if you are having problems.  It's also a good idea to know your test results and keep a list of the medicines you take. How can you care for yourself at home? · Take your antibiotics as directed. Do not stop taking them just because you feel better. You need to take the full course of antibiotics. · Drink extra water and other fluids for the next day or two. This may help wash out the bacteria that are causing the infection. (If you have kidney, heart, or liver disease and have to limit fluids, talk with your doctor before you increase your fluid intake.)  · Avoid drinks that are carbonated or have caffeine. They can irritate the bladder. · Urinate often. Try to empty your bladder each time. · To relieve pain, take a hot bath or lay a heating pad set on low over your lower belly or genital area. Never go to sleep with a heating pad in place. To prevent UTIs  · Drink plenty of water each day. This helps you urinate often, which clears bacteria from your system. (If you have kidney, heart, or liver disease and have to limit fluids, talk with your doctor before you increase your fluid intake.)  · Urinate when you need to. · Urinate right after you have sex. · Change sanitary pads often. · Avoid douches, bubble baths, feminine hygiene sprays, and other feminine hygiene products that have deodorants. · After going to the bathroom, wipe from front to back. When should you call for help? Call your doctor now or seek immediate medical care if:  ? · Symptoms such as fever, chills, nausea, or vomiting get worse or appear for the first time. ? · You have new pain in your back just below your rib cage. This is called flank pain. ? · There is new blood or pus in your urine. ? · You have any problems with your antibiotic medicine. ? Watch closely for changes in your health, and be sure to contact your doctor if:  ? · You are not getting better after taking an antibiotic for 2 days. ? · Your symptoms go away but then come back. Where can you learn more?   Go to https://chpepiceweb.healthCloudLink [email or web address] org and sign in to your SoftoCoupon account. Enter H165 in the [place] box to learn more about \"Urinary Tract Infection in Women: Care Instructions. \"     If you do not have an account, please click on the \"Sign Up Now\" link. Current as of: May 12, 2017  Content Version: 11.5  © 1491-9416 [employer] Technology [employer]. Care instructions adapted under license by [facility] ([facility]). If you have questions about a medical condition or this instruction, always ask your healthcare professional. [place] [address] any warranty or liability for your use of this information. Patient Education        Urinary Tract Infection in Women: Care Instructions  Your Care Instructions    A urinary tract infection, or UTI, is a general term for an infection anywhere between the kidneys and the urethra (where urine comes out). Most UTIs are bladder infections. They often cause pain or burning when you urinate. UTIs are caused by bacteria and can be cured with antibiotics. Be sure to complete your treatment so that the infection goes away. Follow-up care is a key part of your treatment and safety. Be sure to make and go to all appointments, and call your doctor if you are having problems. It's also a good idea to know your test results and keep a list of the medicines you take. How can you care for yourself at home? · Take your antibiotics as directed. Do not stop taking them just because you feel better. You need to take the full course of antibiotics. · Drink extra water and other fluids for the next day or two. This may help wash out the bacteria that are causing the infection. (If you have kidney, heart, or liver disease and have to limit fluids, talk with your doctor before you increase your fluid intake.)  · Avoid drinks that are carbonated or have caffeine. They can irritate the bladder. · Urinate often. Try to empty your bladder each time.   · To relieve pain, take call for help? Call your doctor now or seek immediate medical care if:  ? · Symptoms such as fever, chills, nausea, or vomiting get worse or appear for the first time. ? · You have new pain in your back just below your rib cage. This is called flank pain. ? · There is new blood or pus in your urine. ? · You have any problems with your antibiotic medicine. ? Watch closely for changes in your health, and be sure to contact your doctor if:  ? · You are not getting better after taking an antibiotic for 2 days. ? · Your symptoms go away but then come back. Where can you learn more? Go to https://WeatherNation TVpeGraph Storyeweb.GigaFin Networks. org and sign in to your Kaldoora account. Enter I068 in the Nvidia box to learn more about \"Urinary Tract Infection in Women: Care Instructions. \"     If you do not have an account, please click on the \"Sign Up Now\" link. Current as of: May 12, 2017  Content Version: 11.5  © 7640-8569 Healthwise, Incorporated. Care instructions adapted under license by [facility] ([facility]). If you have questions about a medical condition or this instruction, always ask your healthcare professional. [place] [address] any warranty or liability for your use of this information.

## 2018-02-18 LAB
6-ACETYLMORPHINE: NOT DETECTED
7-AMINOCLONAZEPAM: NOT DETECTED
ALPHA-OH-ALPRAZOLAM: NOT DETECTED
ALPRAZOLAM: NOT DETECTED
AMPHETAMINE: NOT DETECTED
BARBITURATES: NOT DETECTED
BENZOYLECGONINE: NOT DETECTED
BUPRENORPHINE: NOT DETECTED
CARISOPRODOL: NOT DETECTED
CLONAZEPAM: NOT DETECTED
CODEINE: NOT DETECTED
CREATININE URINE: 101.3 MG/DL (ref 20–400)
DIAZEPAM: NOT DETECTED
DRUGS EXPECTED: NORMAL
EER PAIN MGT DRUG PANEL, HIGH RES/EMIT U: NORMAL
ETHYL GLUCURONIDE: NOT DETECTED
FENTANYL: NOT DETECTED
HYDROCODONE: NOT DETECTED
HYDROMORPHONE: NOT DETECTED
LORAZEPAM: NOT DETECTED
MARIJUANA METABOLITE: PRESENT
MDA: NOT DETECTED
MDEA: NOT DETECTED
MDMA URINE: NOT DETECTED
MEPERIDINE: NOT DETECTED
METHADONE: NOT DETECTED
METHAMPHETAMINE: NOT DETECTED
METHYLPHENIDATE: NOT DETECTED
MIDAZOLAM: NOT DETECTED
MORPHINE: NOT DETECTED
NORBUPRENORPHINE, FREE: NOT DETECTED
NORDIAZEPAM: PRESENT
NORFENTANYL: NOT DETECTED
NORHYDROCODONE, URINE: NOT DETECTED
NOROXYCODONE: PRESENT
NOROXYMORPHONE, URINE: PRESENT
OXAZEPAM: PRESENT
OXYCODONE: PRESENT
OXYMORPHONE: NOT DETECTED
PAIN MANAGEMENT DRUG PANEL: NORMAL
PAIN MANAGEMENT DRUG PANEL: NORMAL
PCP: NOT DETECTED
PHENTERMINE: NOT DETECTED
PROPOXYPHENE: NOT DETECTED
TAPENTADOL, URINE: NOT DETECTED
TAPENTADOL-O-SULFATE, URINE: NOT DETECTED
TEMAZEPAM: PRESENT
TRAMADOL: NOT DETECTED
ZOLPIDEM: NOT DETECTED

## 2018-02-18 ASSESSMENT — ENCOUNTER SYMPTOMS
BACK PAIN: 1
COUGH: 0

## 2018-03-17 ENCOUNTER — TELEPHONE (OUTPATIENT)
Dept: FAMILY MEDICINE CLINIC | Age: 63
End: 2018-03-17

## 2018-03-17 DIAGNOSIS — N39.0 RECURRENT UTI: ICD-10-CM

## 2018-03-17 RX ORDER — AMOXICILLIN AND CLAVULANATE POTASSIUM 875; 125 MG/1; MG/1
1 TABLET, FILM COATED ORAL 2 TIMES DAILY WITH MEALS
Qty: 20 TABLET | Refills: 0 | Status: SHIPPED | OUTPATIENT
Start: 2018-03-17 | End: 2018-03-19 | Stop reason: SDUPTHER

## 2018-03-19 DIAGNOSIS — N39.0 RECURRENT UTI: ICD-10-CM

## 2018-03-19 RX ORDER — AMOXICILLIN AND CLAVULANATE POTASSIUM 875; 125 MG/1; MG/1
1 TABLET, FILM COATED ORAL 2 TIMES DAILY WITH MEALS
Qty: 20 TABLET | Refills: 0 | Status: SHIPPED | OUTPATIENT
Start: 2018-03-19 | End: 2018-03-29

## 2018-04-09 ENCOUNTER — OFFICE VISIT (OUTPATIENT)
Dept: FAMILY MEDICINE CLINIC | Age: 63
End: 2018-04-09

## 2018-04-09 VITALS
SYSTOLIC BLOOD PRESSURE: 116 MMHG | TEMPERATURE: 98.2 F | HEART RATE: 75 BPM | OXYGEN SATURATION: 98 % | HEIGHT: 64 IN | DIASTOLIC BLOOD PRESSURE: 70 MMHG

## 2018-04-09 DIAGNOSIS — F17.200 SMOKER: ICD-10-CM

## 2018-04-09 DIAGNOSIS — J06.9 VIRAL URI: Primary | ICD-10-CM

## 2018-04-09 DIAGNOSIS — G40.909 SEIZURE DISORDER (HCC): ICD-10-CM

## 2018-04-09 DIAGNOSIS — E78.00 PURE HYPERCHOLESTEROLEMIA: ICD-10-CM

## 2018-04-09 DIAGNOSIS — C21.0 ANAL CARCINOMA (HCC): ICD-10-CM

## 2018-04-09 DIAGNOSIS — N39.0 FREQUENT UTI: ICD-10-CM

## 2018-04-09 PROCEDURE — G8427 DOCREV CUR MEDS BY ELIG CLIN: HCPCS | Performed by: INTERNAL MEDICINE

## 2018-04-09 PROCEDURE — G8421 BMI NOT CALCULATED: HCPCS | Performed by: INTERNAL MEDICINE

## 2018-04-09 PROCEDURE — 3014F SCREEN MAMMO DOC REV: CPT | Performed by: INTERNAL MEDICINE

## 2018-04-09 PROCEDURE — 3017F COLORECTAL CA SCREEN DOC REV: CPT | Performed by: INTERNAL MEDICINE

## 2018-04-09 PROCEDURE — 4004F PT TOBACCO SCREEN RCVD TLK: CPT | Performed by: INTERNAL MEDICINE

## 2018-04-09 PROCEDURE — 99214 OFFICE O/P EST MOD 30 MIN: CPT | Performed by: INTERNAL MEDICINE

## 2018-04-13 ASSESSMENT — ENCOUNTER SYMPTOMS
WHEEZING: 0
SWOLLEN GLANDS: 0
COUGH: 1
SORE THROAT: 1
RHINORRHEA: 1
TROUBLE SWALLOWING: 0
BACK PAIN: 1
SHORTNESS OF BREATH: 0
SINUS PAIN: 0
VOICE CHANGE: 0

## 2018-04-30 ENCOUNTER — TELEPHONE (OUTPATIENT)
Dept: FAMILY MEDICINE CLINIC | Age: 63
End: 2018-04-30

## 2018-05-04 ENCOUNTER — HOSPITAL ENCOUNTER (OUTPATIENT)
Dept: OTHER | Age: 63
Discharge: HOME | End: 2018-05-04
Attending: INTERNAL MEDICINE | Admitting: INTERNAL MEDICINE

## 2018-05-04 LAB
A/G RATIO: 0.9 (ref 1.1–2.2)
ALBUMIN SERPL-MCNC: 3.4 G/DL (ref 3.4–5)
ALP BLD-CCNC: 173 U/L (ref 40–129)
ALT SERPL-CCNC: 9 U/L (ref 10–40)
ANION GAP SERPL CALCULATED.3IONS-SCNC: 15 MMOL/L (ref 3–16)
AST SERPL-CCNC: 14 U/L (ref 15–37)
BASOPHILS ABSOLUTE: 0 K/UL (ref 0–0.2)
BASOPHILS RELATIVE PERCENT: 0.4 %
BILIRUB SERPL-MCNC: <0.2 MG/DL (ref 0–1)
BUN BLDV-MCNC: 21 MG/DL (ref 7–20)
CALCIUM SERPL-MCNC: 8.5 MG/DL (ref 8.3–10.6)
CHLORIDE BLD-SCNC: 100 MMOL/L (ref 99–110)
CHOLESTEROL, TOTAL: 155 MG/DL (ref 0–199)
CO2: 28 MMOL/L (ref 21–32)
CREAT SERPL-MCNC: 0.6 MG/DL (ref 0.6–1.2)
EOSINOPHILS ABSOLUTE: 0.1 K/UL (ref 0–0.6)
EOSINOPHILS RELATIVE PERCENT: 1.7 %
GFR AFRICAN AMERICAN: >60
GFR NON-AFRICAN AMERICAN: >60
GLOBULIN: 3.9 G/DL
GLUCOSE BLD-MCNC: 86 MG/DL (ref 70–99)
HCT VFR BLD CALC: 41 % (ref 36–48)
HDLC SERPL-MCNC: 46 MG/DL (ref 40–60)
HEMOGLOBIN: 14 G/DL (ref 12–16)
LDL CHOLESTEROL CALCULATED: 84 MG/DL
LYMPHOCYTES ABSOLUTE: 2.6 K/UL (ref 1–5.1)
LYMPHOCYTES RELATIVE PERCENT: 33.6 %
MCH RBC QN AUTO: 32.8 PG (ref 26–34)
MCHC RBC AUTO-ENTMCNC: 34.1 G/DL (ref 31–36)
MCV RBC AUTO: 96.1 FL (ref 80–100)
MONOCYTES ABSOLUTE: 0.5 K/UL (ref 0–1.3)
MONOCYTES RELATIVE PERCENT: 6.7 %
NEUTROPHILS ABSOLUTE: 4.5 K/UL (ref 1.7–7.7)
NEUTROPHILS RELATIVE PERCENT: 57.6 %
PDW BLD-RTO: 14.2 % (ref 12.4–15.4)
PHENYTOIN DOSE AMOUNT: NORMAL
PHENYTOIN LEVEL: 14.9 UG/ML (ref 10–20)
PLATELET # BLD: 159 K/UL (ref 135–450)
PMV BLD AUTO: 9.1 FL (ref 5–10.5)
POTASSIUM SERPL-SCNC: 4.2 MMOL/L (ref 3.5–5.1)
RBC # BLD: 4.27 M/UL (ref 4–5.2)
SODIUM BLD-SCNC: 143 MMOL/L (ref 136–145)
TOTAL PROTEIN: 7.3 G/DL (ref 6.4–8.2)
TRIGL SERPL-MCNC: 125 MG/DL (ref 0–150)
VLDLC SERPL CALC-MCNC: 25 MG/DL
WBC # BLD: 7.8 K/UL (ref 4–11)

## 2018-05-10 ENCOUNTER — OFFICE VISIT (OUTPATIENT)
Dept: FAMILY MEDICINE CLINIC | Age: 63
End: 2018-05-10

## 2018-05-10 VITALS
DIASTOLIC BLOOD PRESSURE: 62 MMHG | HEIGHT: 64 IN | OXYGEN SATURATION: 96 % | HEART RATE: 106 BPM | SYSTOLIC BLOOD PRESSURE: 96 MMHG | WEIGHT: 116 LBS | BODY MASS INDEX: 19.81 KG/M2

## 2018-05-10 DIAGNOSIS — E78.00 PURE HYPERCHOLESTEROLEMIA: Primary | ICD-10-CM

## 2018-05-10 DIAGNOSIS — N39.0 RECURRENT UTI: ICD-10-CM

## 2018-05-10 DIAGNOSIS — K21.9 GASTROESOPHAGEAL REFLUX DISEASE WITHOUT ESOPHAGITIS: ICD-10-CM

## 2018-05-10 DIAGNOSIS — F51.01 PRIMARY INSOMNIA: ICD-10-CM

## 2018-05-10 DIAGNOSIS — G40.909 SEIZURE DISORDER (HCC): ICD-10-CM

## 2018-05-10 DIAGNOSIS — G82.20 PARAPLEGIA (HCC): ICD-10-CM

## 2018-05-10 DIAGNOSIS — G89.4 CHRONIC PAIN SYNDROME: ICD-10-CM

## 2018-05-10 DIAGNOSIS — F17.200 SMOKER: Chronic | ICD-10-CM

## 2018-05-10 PROCEDURE — 99214 OFFICE O/P EST MOD 30 MIN: CPT | Performed by: INTERNAL MEDICINE

## 2018-05-10 PROCEDURE — 3017F COLORECTAL CA SCREEN DOC REV: CPT | Performed by: INTERNAL MEDICINE

## 2018-05-10 PROCEDURE — G8427 DOCREV CUR MEDS BY ELIG CLIN: HCPCS | Performed by: INTERNAL MEDICINE

## 2018-05-10 PROCEDURE — G0179 MD RECERTIFICATION HHA PT: HCPCS | Performed by: INTERNAL MEDICINE

## 2018-05-10 PROCEDURE — 4004F PT TOBACCO SCREEN RCVD TLK: CPT | Performed by: INTERNAL MEDICINE

## 2018-05-10 PROCEDURE — G8420 CALC BMI NORM PARAMETERS: HCPCS | Performed by: INTERNAL MEDICINE

## 2018-05-10 RX ORDER — ZOLPIDEM TARTRATE 10 MG/1
10 TABLET ORAL NIGHTLY
Qty: 90 TABLET | Refills: 0 | Status: SHIPPED | OUTPATIENT
Start: 2018-05-10 | End: 2018-08-08

## 2018-05-10 RX ORDER — OXYCODONE HYDROCHLORIDE 15 MG/1
1 TABLET, FILM COATED, EXTENDED RELEASE ORAL EVERY 12 HOURS
Qty: 180 TABLET | Refills: 0 | Status: SHIPPED | OUTPATIENT
Start: 2018-05-10 | End: 2018-08-09 | Stop reason: SDUPTHER

## 2018-05-10 RX ORDER — GABAPENTIN 600 MG/1
TABLET ORAL
Qty: 270 TABLET | Refills: 0 | Status: SHIPPED | OUTPATIENT
Start: 2018-05-10 | End: 2018-01-01 | Stop reason: SDUPTHER

## 2018-05-10 ASSESSMENT — ENCOUNTER SYMPTOMS
COUGH: 0
BACK PAIN: 1

## 2018-05-16 ENCOUNTER — TELEPHONE (OUTPATIENT)
Dept: FAMILY MEDICINE CLINIC | Age: 63
End: 2018-05-16

## 2018-05-16 DIAGNOSIS — T24.312D BURN OF THIRD DEGREE OF LEFT THIGH, SUBSEQUENT ENCOUNTER: Primary | ICD-10-CM

## 2018-05-21 ENCOUNTER — TELEPHONE (OUTPATIENT)
Dept: FAMILY MEDICINE CLINIC | Age: 63
End: 2018-05-21

## 2018-05-21 DIAGNOSIS — Z12.11 SCREEN FOR COLON CANCER: Primary | ICD-10-CM

## 2018-05-21 DIAGNOSIS — S31.809A WOUND OF BUTTOCK, UNSPECIFIED LATERALITY, INITIAL ENCOUNTER: Primary | ICD-10-CM

## 2018-05-22 RX ORDER — CEPHALEXIN 500 MG/1
500 CAPSULE ORAL 4 TIMES DAILY
Qty: 40 CAPSULE | Refills: 1 | Status: SHIPPED | OUTPATIENT
Start: 2018-05-22 | End: 2018-01-01

## 2018-05-30 PROCEDURE — G0179 MD RECERTIFICATION HHA PT: HCPCS | Performed by: INTERNAL MEDICINE

## 2018-05-31 ENCOUNTER — TELEPHONE (OUTPATIENT)
Dept: FAMILY MEDICINE CLINIC | Age: 63
End: 2018-05-31

## 2018-06-04 ENCOUNTER — TELEPHONE (OUTPATIENT)
Dept: FAMILY MEDICINE CLINIC | Age: 63
End: 2018-06-04

## 2018-06-11 ENCOUNTER — CARE COORDINATION (OUTPATIENT)
Dept: CASE MANAGEMENT | Age: 63
End: 2018-06-11

## 2018-06-11 ENCOUNTER — TELEPHONE (OUTPATIENT)
Dept: FAMILY MEDICINE CLINIC | Age: 63
End: 2018-06-11

## 2018-06-12 ENCOUNTER — TELEPHONE (OUTPATIENT)
Dept: FAMILY MEDICINE CLINIC | Age: 63
End: 2018-06-12

## 2018-06-12 DIAGNOSIS — T24.312D BURN OF THIRD DEGREE OF LEFT THIGH, SUBSEQUENT ENCOUNTER: Primary | ICD-10-CM

## 2018-06-13 ENCOUNTER — OFFICE VISIT (OUTPATIENT)
Dept: FAMILY MEDICINE CLINIC | Age: 63
End: 2018-06-13

## 2018-06-13 VITALS
SYSTOLIC BLOOD PRESSURE: 96 MMHG | HEIGHT: 64 IN | BODY MASS INDEX: 19.81 KG/M2 | HEART RATE: 79 BPM | WEIGHT: 116 LBS | DIASTOLIC BLOOD PRESSURE: 64 MMHG | OXYGEN SATURATION: 96 %

## 2018-06-13 DIAGNOSIS — G89.4 CHRONIC PAIN SYNDROME: ICD-10-CM

## 2018-06-13 DIAGNOSIS — R10.84 GENERALIZED ABDOMINAL PAIN: ICD-10-CM

## 2018-06-13 DIAGNOSIS — E87.6 HYPOKALEMIA: ICD-10-CM

## 2018-06-13 DIAGNOSIS — N39.0 SEPSIS SECONDARY TO UTI (HCC): Primary | ICD-10-CM

## 2018-06-13 DIAGNOSIS — G82.20 PARAPLEGIA (HCC): ICD-10-CM

## 2018-06-13 DIAGNOSIS — Z71.89 GOALS OF CARE, COUNSELING/DISCUSSION: ICD-10-CM

## 2018-06-13 DIAGNOSIS — N30.01 ACUTE CYSTITIS WITH HEMATURIA: ICD-10-CM

## 2018-06-13 DIAGNOSIS — Z51.5 PALLIATIVE CARE ENCOUNTER: ICD-10-CM

## 2018-06-13 DIAGNOSIS — R41.3 MEMORY IMPAIRMENT: ICD-10-CM

## 2018-06-13 DIAGNOSIS — L89.159 PRESSURE ULCER OF COCCYGEAL REGION, UNSPECIFIED PRESSURE ULCER STAGE: ICD-10-CM

## 2018-06-13 DIAGNOSIS — L89.610 PRESSURE ULCER OF HEEL, RIGHT, UNSTAGEABLE (HCC): ICD-10-CM

## 2018-06-13 DIAGNOSIS — A41.9 SEPSIS SECONDARY TO UTI (HCC): Primary | ICD-10-CM

## 2018-06-13 DIAGNOSIS — R78.81 BACTEREMIA DUE TO STAPHYLOCOCCUS: ICD-10-CM

## 2018-06-13 DIAGNOSIS — B95.8 BACTEREMIA DUE TO STAPHYLOCOCCUS: ICD-10-CM

## 2018-06-13 PROCEDURE — 1111F DSCHRG MED/CURRENT MED MERGE: CPT | Performed by: INTERNAL MEDICINE

## 2018-06-13 PROCEDURE — 99213 OFFICE O/P EST LOW 20 MIN: CPT | Performed by: INTERNAL MEDICINE

## 2018-06-19 PROBLEM — L89.610 PRESSURE ULCER OF HEEL, RIGHT, UNSTAGEABLE (HCC): Status: ACTIVE | Noted: 2018-06-19

## 2018-06-19 PROBLEM — L89.159: Status: ACTIVE | Noted: 2018-06-19

## 2018-06-19 PROBLEM — R78.81 BACTEREMIA DUE TO STAPHYLOCOCCUS: Status: ACTIVE | Noted: 2018-06-19

## 2018-06-19 PROBLEM — A41.9 SEPSIS SECONDARY TO UTI (HCC): Status: ACTIVE | Noted: 2018-06-19

## 2018-06-19 PROBLEM — N39.0 SEPSIS SECONDARY TO UTI (HCC): Status: ACTIVE | Noted: 2018-06-19

## 2018-06-19 PROBLEM — B95.8 BACTEREMIA DUE TO STAPHYLOCOCCUS: Status: ACTIVE | Noted: 2018-06-19

## 2018-06-19 ASSESSMENT — ENCOUNTER SYMPTOMS
COUGH: 0
BACK PAIN: 1

## 2018-06-26 ENCOUNTER — CARE COORDINATION (OUTPATIENT)
Dept: CASE MANAGEMENT | Age: 63
End: 2018-06-26

## 2018-06-27 ENCOUNTER — TELEPHONE (OUTPATIENT)
Dept: FAMILY MEDICINE CLINIC | Age: 63
End: 2018-06-27

## 2018-07-09 ENCOUNTER — TELEPHONE (OUTPATIENT)
Dept: FAMILY MEDICINE CLINIC | Age: 63
End: 2018-07-09

## 2018-07-20 ENCOUNTER — TELEPHONE (OUTPATIENT)
Dept: FAMILY MEDICINE CLINIC | Age: 63
End: 2018-07-20

## 2018-07-20 DIAGNOSIS — N39.0 URINARY TRACT INFECTION WITHOUT HEMATURIA, SITE UNSPECIFIED: Primary | ICD-10-CM

## 2018-07-20 RX ORDER — AMOXICILLIN AND CLAVULANATE POTASSIUM 875; 125 MG/1; MG/1
1 TABLET, FILM COATED ORAL 2 TIMES DAILY WITH MEALS
Qty: 20 TABLET | Refills: 0 | Status: SHIPPED | OUTPATIENT
Start: 2018-07-20 | End: 2018-07-30

## 2018-07-20 NOTE — TELEPHONE ENCOUNTER
Patient's  called and said that Laura Chen is coming down with another UTI and he is asking if you would send in a refill of Augmentin to [facility] in [place].

## 2018-08-08 NOTE — PROGRESS NOTES
Subjective:      Patient ID: Tam Jay is a 57 y.o. female. CC- chronic pain     Other multiple issues.:   Gets  her seroquel. Valium and prozac from the psychiatrist.     Has recurrent UTI . Sits all day, paraplegic, non-ambulatory ,    Is paraplegic , wheelchair confined ,cantinue to smoke. Taking ambien for insomnia. On baclofen, oxycodone, neurontin and flexeril for back pain and muscle spasm. Takes zantac for gerd. Takng pravastatin for hyperlipidemia  Latest lipid panel normal.     Prozac helps her depression  On dilantin for seizure disorder has had no seizures, on dilantin  Taking iron for iron deficiency anemia. Still smoking, unable to quit. Hyperlipidemia   Episode onset: over 15 years. The problem is controlled. Recent lipid tests were reviewed and are normal. Factors aggravating her hyperlipidemia include smoking. (Paraplegic, chronic pain) Current antihyperlipidemic treatment includes statins. The current treatment provides significant improvement of lipids. There are no compliance problems. Risk factors for coronary artery disease include post-menopausal, a sedentary lifestyle, stress, dyslipidemia and family history.      Lab Results   Component Value Date    CHOL 155 05/04/2018    CHOL 130 01/02/2017    CHOL 155 09/07/2016     Lab Results   Component Value Date    TRIG 125 05/04/2018    TRIG 91 09/07/2016    TRIG 72 03/10/2016     Lab Results   Component Value Date    HDL 46 05/04/2018    HDL 46 09/07/2016    HDL 49 03/10/2016     Lab Results   Component Value Date    LDLCALC 84 05/04/2018    LDLCALC 91 09/07/2016    LDLCALC 122 03/10/2016     Lab Results   Component Value Date    LABVLDL 25 05/04/2018    LABVLDL 40 04/04/2011    VLDL 2.0 09/07/2016    VLDL 14 03/10/2016    VLDL 23 08/26/2014     Lab Results   Component Value Date    CHOLHDLRATIO 3.4 09/07/2016    CHOLHDLRATIO 4.2 11/25/2015    CHOLHDLRATIO 4.5 09/02/2015       Chemistry        Component Value Date/Time

## 2018-08-09 ENCOUNTER — OFFICE VISIT (OUTPATIENT)
Dept: FAMILY MEDICINE CLINIC | Age: 63
End: 2018-08-09

## 2018-08-09 VITALS
DIASTOLIC BLOOD PRESSURE: 62 MMHG | HEART RATE: 73 BPM | BODY MASS INDEX: 19.81 KG/M2 | WEIGHT: 116 LBS | OXYGEN SATURATION: 92 % | HEIGHT: 64 IN | SYSTOLIC BLOOD PRESSURE: 98 MMHG

## 2018-08-09 DIAGNOSIS — F17.200 SMOKER: Chronic | ICD-10-CM

## 2018-08-09 DIAGNOSIS — F32.A ANXIETY AND DEPRESSION: ICD-10-CM

## 2018-08-09 DIAGNOSIS — N39.0 RECURRENT UTI: Primary | ICD-10-CM

## 2018-08-09 DIAGNOSIS — G89.4 CHRONIC PAIN SYNDROME: ICD-10-CM

## 2018-08-09 DIAGNOSIS — G82.20 PARAPLEGIA (HCC): ICD-10-CM

## 2018-08-09 DIAGNOSIS — F41.9 ANXIETY AND DEPRESSION: ICD-10-CM

## 2018-08-09 DIAGNOSIS — G40.909 SEIZURE DISORDER (HCC): ICD-10-CM

## 2018-08-09 PROCEDURE — 99214 OFFICE O/P EST MOD 30 MIN: CPT | Performed by: INTERNAL MEDICINE

## 2018-08-09 PROCEDURE — 4004F PT TOBACCO SCREEN RCVD TLK: CPT | Performed by: INTERNAL MEDICINE

## 2018-08-09 PROCEDURE — G8420 CALC BMI NORM PARAMETERS: HCPCS | Performed by: INTERNAL MEDICINE

## 2018-08-09 PROCEDURE — G8427 DOCREV CUR MEDS BY ELIG CLIN: HCPCS | Performed by: INTERNAL MEDICINE

## 2018-08-09 PROCEDURE — 3017F COLORECTAL CA SCREEN DOC REV: CPT | Performed by: INTERNAL MEDICINE

## 2018-08-09 RX ORDER — OXYCODONE HYDROCHLORIDE 15 MG/1
1 TABLET, FILM COATED, EXTENDED RELEASE ORAL EVERY 12 HOURS
Qty: 180 TABLET | Refills: 0 | Status: SHIPPED | OUTPATIENT
Start: 2018-08-09 | End: 2018-01-01 | Stop reason: SDUPTHER

## 2018-08-09 RX ORDER — AMOXICILLIN AND CLAVULANATE POTASSIUM 875; 125 MG/1; MG/1
1 TABLET, FILM COATED ORAL 2 TIMES DAILY WITH MEALS
Qty: 20 TABLET | Refills: 1 | Status: SHIPPED | OUTPATIENT
Start: 2018-08-09 | End: 2018-01-01 | Stop reason: SDUPTHER

## 2018-08-09 ASSESSMENT — ENCOUNTER SYMPTOMS
BACK PAIN: 1
COUGH: 0

## 2018-08-09 NOTE — PATIENT INSTRUCTIONS
Patient Education        Patient Education        Patient Education        Chronic Pain: Care Instructions  Your Care Instructions    Chronic pain is pain that lasts a long time (months or even years) and may or may not have a clear cause. It is different from acute pain, which usually does have a clear cause-like an injury or illness-and gets better over time. Chronic pain:  · Lasts over time but may vary from day to day. · Does not go away despite efforts to end it. · May disrupt your sleep and lead to fatigue. · May cause depression or anxiety. · May make your muscles tense, causing more pain. · Can disrupt your work, hobbies, home life, and relationships with friends and family. Chronic pain is a very real condition. It is not just in your head. Treatment can help and usually includes several methods used together, such as medicines, physical therapy, exercise, and other treatments. Learning how to relax and changing negative thought patterns can also help you cope. Chronic pain is complex. Taking an active role in your treatment will help you better manage your pain. Tell your doctor if you have trouble dealing with your pain. You may have to try several things before you find what works best for you. Follow-up care is a key part of your treatment and safety. Be sure to make and go to all appointments, and call your doctor if you are having problems. It's also a good idea to know your test results and keep a list of the medicines you take. How can you care for yourself at home? · Pace yourself. Break up large jobs into smaller tasks. Save harder tasks for days when you have less pain, or go back and forth between hard tasks and easier ones. Take rest breaks. · Relax, and reduce stress. Relaxation techniques such as deep breathing or meditation can help. · Keep moving. Gentle, daily exercise can help reduce pain over the long run.  Try low- or no-impact exercises such as walking, swimming, and stationary biking. Do stretches to stay flexible. · Try heat, cold packs, and massage. · Get enough sleep. Chronic pain can make you tired and drain your energy. Talk with your doctor if you have trouble sleeping because of pain. · Think positive. Your thoughts can affect your pain level. Do things that you enjoy to distract yourself when you have pain instead of focusing on the pain. See a movie, read a book, listen to music, or spend time with a friend. · If you think you are depressed, talk to your doctor about treatment. · Keep a daily pain diary. Record how your moods, thoughts, sleep patterns, activities, and medicine affect your pain. You may find that your pain is worse during or after certain activities or when you are feeling a certain emotion. Having a record of your pain can help you and your doctor find the best ways to treat your pain. · Take pain medicines exactly as directed. ¨ If the doctor gave you a prescription medicine for pain, take it as prescribed. ¨ If you are not taking a prescription pain medicine, ask your doctor if you can take an over-the-counter medicine. Reducing constipation caused by pain medicine  · Include fruits, vegetables, beans, and whole grains in your diet each day. These foods are high in fiber. · Drink plenty of fluids, enough so that your urine is light yellow or clear like water. If you have kidney, heart, or liver disease and have to limit fluids, talk with your doctor before you increase the amount of fluids you drink. · If your doctor recommends it, get more exercise. Walking is a good choice. Bit by bit, increase the amount you walk every day. Try for at least 30 minutes on most days of the week. · Schedule time each day for a bowel movement. A daily routine may help. Take your time and do not strain when having a bowel movement. When should you call for help?   Call your doctor now or seek immediate medical care if:    · Your pain gets worse or is out of

## 2018-08-16 ENCOUNTER — OFFICE VISIT (OUTPATIENT)
Dept: FAMILY MEDICINE CLINIC | Age: 63
End: 2018-08-16

## 2018-08-16 VITALS
HEIGHT: 64 IN | DIASTOLIC BLOOD PRESSURE: 62 MMHG | OXYGEN SATURATION: 95 % | HEART RATE: 60 BPM | SYSTOLIC BLOOD PRESSURE: 108 MMHG | BODY MASS INDEX: 19.91 KG/M2

## 2018-08-16 DIAGNOSIS — N30.00 ACUTE CYSTITIS WITHOUT HEMATURIA: ICD-10-CM

## 2018-08-16 DIAGNOSIS — L02.419 ABSCESS OF ARM: Primary | ICD-10-CM

## 2018-08-16 PROCEDURE — 4004F PT TOBACCO SCREEN RCVD TLK: CPT | Performed by: INTERNAL MEDICINE

## 2018-08-16 PROCEDURE — G8427 DOCREV CUR MEDS BY ELIG CLIN: HCPCS | Performed by: INTERNAL MEDICINE

## 2018-08-16 PROCEDURE — 3017F COLORECTAL CA SCREEN DOC REV: CPT | Performed by: INTERNAL MEDICINE

## 2018-08-16 PROCEDURE — 99213 OFFICE O/P EST LOW 20 MIN: CPT | Performed by: INTERNAL MEDICINE

## 2018-08-16 PROCEDURE — G8420 CALC BMI NORM PARAMETERS: HCPCS | Performed by: INTERNAL MEDICINE

## 2018-08-16 RX ORDER — CEPHALEXIN 500 MG/1
500 CAPSULE ORAL 4 TIMES DAILY
Qty: 40 CAPSULE | Refills: 0 | Status: SHIPPED | OUTPATIENT
Start: 2018-08-16 | End: 2018-01-01
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
## 2018-08-17 ENCOUNTER — TELEPHONE (OUTPATIENT)
Dept: FAMILY MEDICINE CLINIC | Age: 63
End: 2018-08-17

## 2018-08-19 ASSESSMENT — ENCOUNTER SYMPTOMS
COUGH: 0
BACK PAIN: 1

## 2018-08-22 ENCOUNTER — INITIAL CONSULT (OUTPATIENT)
Dept: SURGERY | Age: 63
End: 2018-08-22

## 2018-08-22 VITALS
SYSTOLIC BLOOD PRESSURE: 100 MMHG | BODY MASS INDEX: 19.63 KG/M2 | HEIGHT: 64 IN | DIASTOLIC BLOOD PRESSURE: 60 MMHG | WEIGHT: 115 LBS

## 2018-08-22 DIAGNOSIS — L02.91 SOFT TISSUE ABSCESS: Primary | ICD-10-CM

## 2018-08-22 PROCEDURE — 3017F COLORECTAL CA SCREEN DOC REV: CPT | Performed by: SURGERY

## 2018-08-22 PROCEDURE — 99202 OFFICE O/P NEW SF 15 MIN: CPT | Performed by: SURGERY

## 2018-08-22 PROCEDURE — 4004F PT TOBACCO SCREEN RCVD TLK: CPT | Performed by: SURGERY

## 2018-08-22 PROCEDURE — G8427 DOCREV CUR MEDS BY ELIG CLIN: HCPCS | Performed by: SURGERY

## 2018-08-22 PROCEDURE — 10061 I&D ABSCESS COMP/MULTIPLE: CPT | Performed by: SURGERY

## 2018-08-22 PROCEDURE — G8420 CALC BMI NORM PARAMETERS: HCPCS | Performed by: SURGERY

## 2018-08-23 ENCOUNTER — TELEPHONE (OUTPATIENT)
Dept: FAMILY MEDICINE CLINIC | Age: 63
End: 2018-08-23

## 2018-08-27 ENCOUNTER — TELEPHONE (OUTPATIENT)
Dept: SURGERY | Age: 63
End: 2018-08-27

## 2018-08-27 RX ORDER — CLINDAMYCIN HYDROCHLORIDE 300 MG/1
300 CAPSULE ORAL 3 TIMES DAILY
Qty: 21 CAPSULE | Refills: 0 | Status: SHIPPED | OUTPATIENT
Start: 2018-08-27 | End: 2018-09-03

## 2018-08-28 NOTE — TELEPHONE ENCOUNTER
[facility] called re: the oxycodone Rx that MD wrote for pt on 8/9/18. The effective fill date is past the 2 week window so they need verbal clarification from provider that it's okay to fill. Last Rx was filled in May and currently pt should be on day 80 of 90 so she has 8 days left on her current Rx. Pls call pharmacy back at [phone] with verbal ok to fill.
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
## 2018-11-08 NOTE — PROGRESS NOTES
Vaccine Information Sheet, \"Influenza - Inactivated\"  given to Rene Araujo, or parent/legal guardian of  Rene Araujo and verbalized understanding. Patient responses:    Have you ever had a reaction to a flu vaccine? No  Are you able to eat eggs without adverse effects? Yes  Do you have any current illness? No  Have you ever had Guillian [place] Syndrome? No    Flu vaccine given per order. Please see immunization tab.
BP: 108/64   Pulse: 76   SpO2: 99%        Body mass index is 18.54 kg/m². Physical Exam   Constitutional: She is oriented to person, place, and time. Thin, paraplegic female   HENT:   Head: Normocephalic. Eyes: Pupils are equal, round, and reactive to light. No scleral icterus. Neck: Normal range of motion. Neck supple. No thyromegaly present. Cardiovascular: Normal rate and regular rhythm. Pulmonary/Chest: Effort normal and breath sounds normal. Tenderness: has a peg tube. Abdominal: Soft. Peg tube in placed   Musculoskeletal: She exhibits tenderness. She exhibits no edema. paraplegic   Lymphadenopathy:     She has no cervical adenopathy. Neurological: She is alert and oriented to person, place, and time. Paraplegic     Psychiatric: She has a normal mood and affect. Nursing note and vitals reviewed. ASSESSMENT/PLAN:  1. Flu vaccine need    - INFLUENZA, HIGH DOSE, 65 YRS +, IM, PF, PREFILL SYR, 0.5ML (FLUZONE HD)    2. Primary insomnia  -takes ambien    3. Chronic pain syndrome    - gabapentin (NEURONTIN) 600 MG tablet; TAKE 1 TABLET THREE TIMES A DAY. Dispense: 270 tablet; Refill: 0  - oxyCODONE (OXYCONTIN) 15 MG T12A extended release tablet; Take 1 tablet by mouth every 12 hours for 90 days. .  Dispense: 180 tablet; Refill: 0  - tiZANidine (ZANAFLEX) 4 MG tablet; Take 1 tablet by mouth 2 times daily  Dispense: 180 tablet; Refill: 1    4. Muscle spasm    - baclofen (LIORESAL) 10 MG tablet; Take 1 tablet by mouth 3 times daily  Dispense: 270 tablet; Refill: 1    5. Pure hypercholesterolemia  -on pravastatin    6. Recurrent UTI    - amoxicillin-clavulanate (AUGMENTIN) 875-125 MG per tablet; Take 1 tablet by mouth 2 times daily (with meals) for 10 days  Dispense: 20 tablet; Refill: 2    7.  Seizure disorder ([address]  -on yadi Gonzaelz received counseling on the following healthy behaviors: nutrition, exercise and medication adherence    Patient given educational materials on

## 2018-11-08 NOTE — PATIENT INSTRUCTIONS
lost interest in things that you usually enjoy. Where can you learn more? Go to [email or web address] org and sign in to your Teach Me To Be account. Enter P513 in the [employer] box to learn more about \"Insomnia: Care Instructions. \"     If you do not have an account, please click on the \"Sign Up Now\" link. Current as of: June 29, 2018  Content Version: 11.8  © 6913-3126 [employer]. Care instructions adapted under license by [facility]"[employer] [facility] ([facility]). If you have questions about a medical condition or this instruction, always ask your healthcare professional. [place] [address] any warranty or liability for your use of this information. Patient Education        Urinary Tract Infection in Women: Care Instructions  Your Care Instructions    A urinary tract infection, or UTI, is a general term for an infection anywhere between the kidneys and the urethra (where urine comes out). Most UTIs are bladder infections. They often cause pain or burning when you urinate. UTIs are caused by bacteria and can be cured with antibiotics. Be sure to complete your treatment so that the infection goes away. Follow-up care is a key part of your treatment and safety. Be sure to make and go to all appointments, and call your doctor if you are having problems. It's also a good idea to know your test results and keep a list of the medicines you take. How can you care for yourself at home? · Take your antibiotics as directed. Do not stop taking them just because you feel better. You need to take the full course of antibiotics. · Drink extra water and other fluids for the next day or two. This may help wash out the bacteria that are causing the infection. (If you have kidney, heart, or liver disease and have to limit fluids, talk with your doctor before you increase your fluid intake.)  · Avoid drinks that are carbonated or have caffeine. They can irritate the bladder. · Urinate often. this information. Patient Education        Urinary Tract Infection in Women: Care Instructions  Your Care Instructions    A urinary tract infection, or UTI, is a general term for an infection anywhere between the kidneys and the urethra (where urine comes out). Most UTIs are bladder infections. They often cause pain or burning when you urinate. UTIs are caused by bacteria and can be cured with antibiotics. Be sure to complete your treatment so that the infection goes away. Follow-up care is a key part of your treatment and safety. Be sure to make and go to all appointments, and call your doctor if you are having problems. It's also a good idea to know your test results and keep a list of the medicines you take. How can you care for yourself at home? · Take your antibiotics as directed. Do not stop taking them just because you feel better. You need to take the full course of antibiotics. · Drink extra water and other fluids for the next day or two. This may help wash out the bacteria that are causing the infection. (If you have kidney, heart, or liver disease and have to limit fluids, talk with your doctor before you increase your fluid intake.)  · Avoid drinks that are carbonated or have caffeine. They can irritate the bladder. · Urinate often. Try to empty your bladder each time. · To relieve pain, take a hot bath or lay a heating pad set on low over your lower belly or genital area. Never go to sleep with a heating pad in place. To prevent UTIs  · Drink plenty of water each day. This helps you urinate often, which clears bacteria from your system. (If you have kidney, heart, or liver disease and have to limit fluids, talk with your doctor before you increase your fluid intake.)  · Urinate when you need to. · Urinate right after you have sex. · Change sanitary pads often. · Avoid douches, bubble baths, feminine hygiene sprays, and other feminine hygiene products that have deodorants.   · After going to the bathroom, wipe from front to back. When should you call for help? Call your doctor now or seek immediate medical care if:    · Symptoms such as fever, chills, nausea, or vomiting get worse or appear for the first time.     · You have new pain in your back just below your rib cage. This is called flank pain.     · There is new blood or pus in your urine.     · You have any problems with your antibiotic medicine.    Watch closely for changes in your health, and be sure to contact your doctor if:    · You are not getting better after taking an antibiotic for 2 days.     · Your symptoms go away but then come back. Where can you learn more? Go to https://CryoTherapeuticspePurveyour.the [email or web address] org and sign in to your Interana account. Enter G403 in the [employer] box to learn more about \"Urinary Tract Infection in Women: Care Instructions. \"     If you do not have an account, please click on the \"Sign Up Now\" link. Current as of: March 21, 2018  Content Version: 11.8  © 8199-9124 Healthwise, Incorporated. Care instructions adapted under license by [facility] ([facility]). If you have questions about a medical condition or this instruction, always ask your healthcare professional. [place] [address] any warranty or liability for your use of this information.

## 2018-11-11 PROBLEM — N39.0 SEPSIS SECONDARY TO UTI (HCC): Status: RESOLVED | Noted: 2018-06-19 | Resolved: 2018-01-01

## 2018-11-11 PROBLEM — A41.9 SEPSIS SECONDARY TO UTI (HCC): Status: RESOLVED | Noted: 2018-06-19 | Resolved: 2018-01-01

## 2018-11-11 PROBLEM — E78.00 PURE HYPERCHOLESTEROLEMIA: Status: ACTIVE | Noted: 2018-01-01

## 2018-11-11 PROBLEM — B95.8 BACTEREMIA DUE TO STAPHYLOCOCCUS: Status: RESOLVED | Noted: 2018-06-19 | Resolved: 2018-01-01

## 2018-11-11 PROBLEM — R78.81 BACTEREMIA DUE TO STAPHYLOCOCCUS: Status: RESOLVED | Noted: 2018-06-19 | Resolved: 2018-01-01

## 2018-11-14 NOTE — TELEPHONE ENCOUNTER
Last appt - 8/16/2018    Future Appointments  Date Time Provider Joe Ambriz   2/27/2019 10:00 AM Radha Garcia MD [facility]

## 2018-12-27 NOTE — TELEPHONE ENCOUNTER
Doris Hawkins from [address]  called wanting the status update on a request they have now faxed twice (12.13 and 12.19). The request is on a lidocaine 5% ointment and calcipotriene 0.005% cream    Please advise.  Thank you    Future Appointments  Date Time Provider Joe Ambriz   2/27/2019 10:00 AM Haile Cortés MD [facility]

## 2019-01-01 ENCOUNTER — ANESTHESIA (OUTPATIENT)
Dept: ENDOSCOPY | Age: 64
DRG: 871 | End: 2019-01-01
Payer: MEDICARE

## 2019-01-01 ENCOUNTER — HOSPITAL ENCOUNTER (OUTPATIENT)
Dept: WOUND CARE | Age: 64
Discharge: HOME | DRG: 871 | End: 2019-03-22
Payer: MEDICARE

## 2019-01-01 ENCOUNTER — ANESTHESIA EVENT (OUTPATIENT)
Dept: ENDOSCOPY | Age: 64
DRG: 871 | End: 2019-01-01
Payer: MEDICARE

## 2019-01-01 ENCOUNTER — TELEPHONE (OUTPATIENT)
Dept: FAMILY MEDICINE CLINIC | Age: 64
End: 2019-01-01

## 2019-01-01 ENCOUNTER — ANESTHESIA EVENT (OUTPATIENT)
Dept: ENDOSCOPY | Age: 64
End: 2019-01-01
Payer: MEDICARE

## 2019-01-01 ENCOUNTER — INITIAL CONSULT (OUTPATIENT)
Dept: GASTROENTEROLOGY | Age: 64
End: 2019-01-01
Payer: MEDICARE

## 2019-01-01 ENCOUNTER — CARE COORDINATION (OUTPATIENT)
Dept: CARE COORDINATION | Age: 64
End: 2019-01-01

## 2019-01-01 ENCOUNTER — TELEPHONE (OUTPATIENT)
Dept: WOUND CARE | Age: 64
End: 2019-01-01

## 2019-01-01 ENCOUNTER — APPOINTMENT (OUTPATIENT)
Dept: CT IMAGING | Age: 64
DRG: 871 | End: 2019-01-01
Attending: INTERNAL MEDICINE
Payer: MEDICARE

## 2019-01-01 ENCOUNTER — APPOINTMENT (OUTPATIENT)
Dept: GENERAL RADIOLOGY | Age: 64
DRG: 871 | End: 2019-01-01
Attending: INTERNAL MEDICINE
Payer: MEDICARE

## 2019-01-01 ENCOUNTER — TELEPHONE (OUTPATIENT)
Dept: GASTROENTEROLOGY | Age: 64
End: 2019-01-01

## 2019-01-01 ENCOUNTER — OFFICE VISIT (OUTPATIENT)
Dept: FAMILY MEDICINE CLINIC | Age: 64
End: 2019-01-01
Payer: MEDICARE

## 2019-01-01 ENCOUNTER — HOSPITAL ENCOUNTER (OUTPATIENT)
Dept: WOUND CARE | Age: 64
Discharge: HOME | End: 2019-03-08
Payer: MEDICARE

## 2019-01-01 ENCOUNTER — HOSPITAL ENCOUNTER (INPATIENT)
Age: 64
LOS: 7 days | Discharge: NURSING, REHAB OR LONG-TERM CARE | DRG: 871 | End: 2019-03-29
Attending: INTERNAL MEDICINE | Admitting: INTERNAL MEDICINE
Payer: MEDICARE

## 2019-01-01 ENCOUNTER — HOSPITAL ENCOUNTER (OUTPATIENT)
Dept: WOUND CARE | Age: 64
Discharge: HOME | End: 2019-03-15
Payer: MEDICARE

## 2019-01-01 ENCOUNTER — CARE COORDINATION (OUTPATIENT)
Dept: CASE MANAGEMENT | Age: 64
End: 2019-01-01

## 2019-01-01 ENCOUNTER — HOSPITAL ENCOUNTER (OUTPATIENT)
Age: 64
Setting detail: OUTPATIENT SURGERY
Discharge: HOME | End: 2019-09-06
Attending: INTERNAL MEDICINE | Admitting: INTERNAL MEDICINE
Payer: MEDICARE

## 2019-01-01 ENCOUNTER — ANESTHESIA (OUTPATIENT)
Dept: ENDOSCOPY | Age: 64
End: 2019-01-01
Payer: MEDICARE

## 2019-01-01 ENCOUNTER — HOSPITAL ENCOUNTER (OUTPATIENT)
Age: 64
Setting detail: OUTPATIENT SURGERY
Discharge: HOME | End: 2019-05-03
Attending: INTERNAL MEDICINE | Admitting: INTERNAL MEDICINE
Payer: MEDICARE

## 2019-01-01 ENCOUNTER — APPOINTMENT (OUTPATIENT)
Dept: ULTRASOUND IMAGING | Age: 64
DRG: 871 | End: 2019-01-01
Attending: INTERNAL MEDICINE
Payer: MEDICARE

## 2019-01-01 VITALS — BODY MASS INDEX: 16.67 KG/M2 | HEIGHT: 64 IN | RESPIRATION RATE: 16 BRPM | TEMPERATURE: 97.6 F | WEIGHT: 97.66 LBS

## 2019-01-01 VITALS
HEART RATE: 70 BPM | BODY MASS INDEX: 16.9 KG/M2 | WEIGHT: 99 LBS | HEIGHT: 64 IN | SYSTOLIC BLOOD PRESSURE: 113 MMHG | DIASTOLIC BLOOD PRESSURE: 80 MMHG

## 2019-01-01 VITALS
DIASTOLIC BLOOD PRESSURE: 76 MMHG | WEIGHT: 104 LBS | OXYGEN SATURATION: 100 % | HEART RATE: 84 BPM | TEMPERATURE: 97.8 F | SYSTOLIC BLOOD PRESSURE: 126 MMHG | RESPIRATION RATE: 24 BRPM | BODY MASS INDEX: 17.75 KG/M2 | HEIGHT: 64 IN

## 2019-01-01 VITALS
HEART RATE: 76 BPM | TEMPERATURE: 99 F | WEIGHT: 114 LBS | BODY MASS INDEX: 19.46 KG/M2 | DIASTOLIC BLOOD PRESSURE: 70 MMHG | RESPIRATION RATE: 15 BRPM | SYSTOLIC BLOOD PRESSURE: 104 MMHG | OXYGEN SATURATION: 92 % | HEIGHT: 64 IN

## 2019-01-01 VITALS
OXYGEN SATURATION: 97 % | WEIGHT: 107 LBS | HEART RATE: 78 BPM | HEIGHT: 64 IN | DIASTOLIC BLOOD PRESSURE: 64 MMHG | BODY MASS INDEX: 18.27 KG/M2 | SYSTOLIC BLOOD PRESSURE: 94 MMHG

## 2019-01-01 VITALS
DIASTOLIC BLOOD PRESSURE: 83 MMHG | TEMPERATURE: 97.5 F | SYSTOLIC BLOOD PRESSURE: 123 MMHG | HEART RATE: 91 BPM | RESPIRATION RATE: 18 BRPM

## 2019-01-01 VITALS
BODY MASS INDEX: 19.46 KG/M2 | SYSTOLIC BLOOD PRESSURE: 102 MMHG | OXYGEN SATURATION: 93 % | WEIGHT: 114 LBS | DIASTOLIC BLOOD PRESSURE: 64 MMHG | HEIGHT: 64 IN | HEART RATE: 87 BPM

## 2019-01-01 VITALS
HEART RATE: 94 BPM | SYSTOLIC BLOOD PRESSURE: 89 MMHG | RESPIRATION RATE: 18 BRPM | TEMPERATURE: 97.7 F | DIASTOLIC BLOOD PRESSURE: 60 MMHG

## 2019-01-01 VITALS — OXYGEN SATURATION: 98 % | SYSTOLIC BLOOD PRESSURE: 97 MMHG | DIASTOLIC BLOOD PRESSURE: 64 MMHG

## 2019-01-01 VITALS
TEMPERATURE: 98 F | DIASTOLIC BLOOD PRESSURE: 76 MMHG | BODY MASS INDEX: 17.42 KG/M2 | WEIGHT: 102 LBS | RESPIRATION RATE: 25 BRPM | HEIGHT: 64 IN | OXYGEN SATURATION: 93 % | HEART RATE: 80 BPM | SYSTOLIC BLOOD PRESSURE: 141 MMHG

## 2019-01-01 VITALS — DIASTOLIC BLOOD PRESSURE: 81 MMHG | OXYGEN SATURATION: 99 % | SYSTOLIC BLOOD PRESSURE: 97 MMHG

## 2019-01-01 VITALS
OXYGEN SATURATION: 92 % | SYSTOLIC BLOOD PRESSURE: 122 MMHG | WEIGHT: 114 LBS | BODY MASS INDEX: 19.46 KG/M2 | DIASTOLIC BLOOD PRESSURE: 64 MMHG | HEIGHT: 64 IN | HEART RATE: 88 BPM

## 2019-01-01 VITALS
SYSTOLIC BLOOD PRESSURE: 110 MMHG | OXYGEN SATURATION: 91 % | HEART RATE: 70 BPM | DIASTOLIC BLOOD PRESSURE: 64 MMHG | BODY MASS INDEX: 16.9 KG/M2 | WEIGHT: 99 LBS | HEIGHT: 64 IN

## 2019-01-01 VITALS
DIASTOLIC BLOOD PRESSURE: 53 MMHG | SYSTOLIC BLOOD PRESSURE: 91 MMHG | RESPIRATION RATE: 15 BRPM | OXYGEN SATURATION: 97 %

## 2019-01-01 DIAGNOSIS — L89.610 PRESSURE ULCER OF HEEL, RIGHT, UNSTAGEABLE (HCC): Primary | ICD-10-CM

## 2019-01-01 DIAGNOSIS — E44.0 MODERATE PROTEIN-CALORIE MALNUTRITION (HCC): ICD-10-CM

## 2019-01-01 DIAGNOSIS — Z93.1 S/P PERCUTANEOUS ENDOSCOPIC GASTROSTOMY (PEG) TUBE PLACEMENT (HCC): ICD-10-CM

## 2019-01-01 DIAGNOSIS — G82.20 PARAPLEGIA (HCC): ICD-10-CM

## 2019-01-01 DIAGNOSIS — G40.909 SEIZURE DISORDER (HCC): ICD-10-CM

## 2019-01-01 DIAGNOSIS — K21.9 GASTROESOPHAGEAL REFLUX DISEASE WITHOUT ESOPHAGITIS: ICD-10-CM

## 2019-01-01 DIAGNOSIS — F33.41 RECURRENT MAJOR DEPRESSION IN PARTIAL REMISSION (HCC): ICD-10-CM

## 2019-01-01 DIAGNOSIS — R13.19 ESOPHAGEAL DYSPHAGIA: ICD-10-CM

## 2019-01-01 DIAGNOSIS — Z97.8 CHRONIC INDWELLING FOLEY CATHETER: ICD-10-CM

## 2019-01-01 DIAGNOSIS — N39.0 URINARY TRACT INFECTION WITH HEMATURIA, SITE UNSPECIFIED: ICD-10-CM

## 2019-01-01 DIAGNOSIS — R12 HEARTBURN: ICD-10-CM

## 2019-01-01 DIAGNOSIS — F32.A ANXIETY AND DEPRESSION: ICD-10-CM

## 2019-01-01 DIAGNOSIS — R11.15 NON-INTRACTABLE CYCLICAL VOMITING WITH NAUSEA: Primary | ICD-10-CM

## 2019-01-01 DIAGNOSIS — L02.419 ABSCESS OF ARM: ICD-10-CM

## 2019-01-01 DIAGNOSIS — R11.0 NAUSEA: ICD-10-CM

## 2019-01-01 DIAGNOSIS — F41.0 PANIC ATTACK: Primary | ICD-10-CM

## 2019-01-01 DIAGNOSIS — Z85.048 HISTORY OF ANAL CANCER: ICD-10-CM

## 2019-01-01 DIAGNOSIS — E43 SEVERE PROTEIN-CALORIE MALNUTRITION (HCC): ICD-10-CM

## 2019-01-01 DIAGNOSIS — F17.200 SMOKER: ICD-10-CM

## 2019-01-01 DIAGNOSIS — N39.0 RECURRENT UTI: ICD-10-CM

## 2019-01-01 DIAGNOSIS — F41.9 ANXIETY AND DEPRESSION: ICD-10-CM

## 2019-01-01 DIAGNOSIS — G89.4 CHRONIC PAIN SYNDROME: ICD-10-CM

## 2019-01-01 DIAGNOSIS — E78.00 HYPERCHOLESTEROLEMIA: ICD-10-CM

## 2019-01-01 DIAGNOSIS — L89.143 PRESSURE INJURY OF LEFT LOWER BACK, STAGE 3 (HCC): ICD-10-CM

## 2019-01-01 DIAGNOSIS — R63.4 WEIGHT LOSS: Primary | ICD-10-CM

## 2019-01-01 DIAGNOSIS — Z00.00 ROUTINE GENERAL MEDICAL EXAMINATION AT A HEALTH CARE FACILITY: Primary | ICD-10-CM

## 2019-01-01 DIAGNOSIS — F17.200 SMOKER: Chronic | ICD-10-CM

## 2019-01-01 DIAGNOSIS — L89.223 PRESSURE ULCER OF LEFT HIP, STAGE 3 (HCC): ICD-10-CM

## 2019-01-01 DIAGNOSIS — L89.154 PRESSURE ULCER OF COCCYGEAL REGION, STAGE 4 (HCC): ICD-10-CM

## 2019-01-01 DIAGNOSIS — F51.02 ADJUSTMENT INSOMNIA: ICD-10-CM

## 2019-01-01 DIAGNOSIS — R06.2 WHEEZING: Primary | ICD-10-CM

## 2019-01-01 DIAGNOSIS — G82.20 PARAPLEGIA (HCC): Primary | ICD-10-CM

## 2019-01-01 DIAGNOSIS — J40 BRONCHITIS: Primary | ICD-10-CM

## 2019-01-01 DIAGNOSIS — R06.4 LABORED BREATHING: Primary | ICD-10-CM

## 2019-01-01 DIAGNOSIS — F33.41 RECURRENT MAJOR DEPRESSIVE DISORDER IN PARTIAL REMISSION (HCC): ICD-10-CM

## 2019-01-01 DIAGNOSIS — M62.838 MUSCLE SPASM: ICD-10-CM

## 2019-01-01 DIAGNOSIS — F32.9 REACTIVE DEPRESSION: ICD-10-CM

## 2019-01-01 DIAGNOSIS — L89.610 PRESSURE ULCER OF HEEL, RIGHT, UNSTAGEABLE (HCC): ICD-10-CM

## 2019-01-01 DIAGNOSIS — Z93.1 S/P PERCUTANEOUS ENDOSCOPIC GASTROSTOMY (PEG) TUBE PLACEMENT (HCC): Primary | ICD-10-CM

## 2019-01-01 DIAGNOSIS — R06.2 WHEEZING: ICD-10-CM

## 2019-01-01 DIAGNOSIS — L89.223 PRESSURE ULCER OF LEFT HIP, STAGE 3 (HCC): Primary | ICD-10-CM

## 2019-01-01 DIAGNOSIS — G89.4 CHRONIC PAIN SYNDROME: Primary | ICD-10-CM

## 2019-01-01 DIAGNOSIS — E78.00 HYPERCHOLESTEROLEMIA: Primary | ICD-10-CM

## 2019-01-01 DIAGNOSIS — B99.9 INFECTION: ICD-10-CM

## 2019-01-01 DIAGNOSIS — M62.838 MUSCLE SPASMS OF BOTH LOWER EXTREMITIES: ICD-10-CM

## 2019-01-01 DIAGNOSIS — L89.159 PRESSURE ULCER OF COCCYGEAL REGION, UNSPECIFIED PRESSURE ULCER STAGE: ICD-10-CM

## 2019-01-01 DIAGNOSIS — E78.00 PURE HYPERCHOLESTEROLEMIA: ICD-10-CM

## 2019-01-01 DIAGNOSIS — R31.9 URINARY TRACT INFECTION WITH HEMATURIA, SITE UNSPECIFIED: ICD-10-CM

## 2019-01-01 DIAGNOSIS — N39.0 RECURRENT UTI: Primary | ICD-10-CM

## 2019-01-01 LAB
A/G RATIO: 0.7 (ref 1.1–2.2)
ALBUMIN SERPL-MCNC: 1.8 G/DL (ref 3.4–5)
ALBUMIN SERPL-MCNC: 1.9 G/DL (ref 3.4–5)
ALBUMIN SERPL-MCNC: 1.9 G/DL (ref 3.4–5)
ALBUMIN SERPL-MCNC: 2 G/DL (ref 3.4–5)
ALBUMIN SERPL-MCNC: 2.1 G/DL (ref 3.4–5)
ALBUMIN SERPL-MCNC: 2.4 G/DL (ref 3.4–5)
ALBUMIN SERPL-MCNC: 2.5 G/DL (ref 3.4–5)
ALP BLD-CCNC: 147 U/L (ref 40–129)
ALP BLD-CCNC: 164 U/L (ref 40–129)
ALP BLD-CCNC: 241 U/L (ref 40–129)
ALT SERPL-CCNC: 12 U/L (ref 10–40)
ALT SERPL-CCNC: 16 U/L (ref 10–40)
ALT SERPL-CCNC: 27 U/L (ref 10–40)
ANION GAP SERPL CALCULATED.3IONS-SCNC: 11 MMOL/L (ref 3–16)
ANION GAP SERPL CALCULATED.3IONS-SCNC: 14 MMOL/L (ref 3–16)
ANION GAP SERPL CALCULATED.3IONS-SCNC: 14 MMOL/L (ref 3–16)
ANION GAP SERPL CALCULATED.3IONS-SCNC: 7 MMOL/L (ref 3–16)
ANION GAP SERPL CALCULATED.3IONS-SCNC: 8 MMOL/L (ref 3–16)
ANION GAP SERPL CALCULATED.3IONS-SCNC: 9 MMOL/L (ref 3–16)
APPEARANCE BAL (LAVAGE): CLEAR
AST SERPL-CCNC: 11 U/L (ref 15–37)
AST SERPL-CCNC: 14 U/L (ref 15–37)
AST SERPL-CCNC: 28 U/L (ref 15–37)
BACTERIA: ABNORMAL /HPF
BASE EXCESS ARTERIAL: 0.1 MMOL/L (ref -3–3)
BASOPHILS ABSOLUTE: 0 K/UL (ref 0–0.2)
BASOPHILS RELATIVE PERCENT: 0.2 %
BASOPHILS RELATIVE PERCENT: 0.3 %
BASOPHILS RELATIVE PERCENT: 0.3 %
BASOPHILS RELATIVE PERCENT: 0.4 %
BASOPHILS RELATIVE PERCENT: 0.5 %
BASOPHILS RELATIVE PERCENT: 0.6 %
BILIRUB SERPL-MCNC: 0.4 MG/DL (ref 0–1)
BILIRUB SERPL-MCNC: <0.2 MG/DL (ref 0–1)
BILIRUB SERPL-MCNC: <0.2 MG/DL (ref 0–1)
BILIRUBIN DIRECT: <0.2 MG/DL (ref 0–0.3)
BILIRUBIN DIRECT: <0.2 MG/DL (ref 0–0.3)
BILIRUBIN URINE: NEGATIVE
BILIRUBIN, INDIRECT: ABNORMAL MG/DL (ref 0–1)
BILIRUBIN, INDIRECT: ABNORMAL MG/DL (ref 0–1)
BILIRUBIN, POC: ABNORMAL
BLOOD CULTURE, ROUTINE: NORMAL
BLOOD URINE, POC: ABNORMAL
BLOOD, URINE: ABNORMAL
BUN BLDV-MCNC: 10 MG/DL (ref 7–20)
BUN BLDV-MCNC: 11 MG/DL (ref 7–20)
BUN BLDV-MCNC: 14 MG/DL (ref 7–20)
BUN BLDV-MCNC: 15 MG/DL (ref 7–20)
BUN BLDV-MCNC: 20 MG/DL (ref 7–20)
BUN BLDV-MCNC: 8 MG/DL (ref 7–20)
C-REACTIVE PROTEIN: 268.9 MG/L (ref 0–5.1)
C-REACTIVE PROTEIN: 41.3 MG/L (ref 0–5.1)
CALCIUM SERPL-MCNC: 7.2 MG/DL (ref 8.3–10.6)
CALCIUM SERPL-MCNC: 7.3 MG/DL (ref 8.3–10.6)
CALCIUM SERPL-MCNC: 7.4 MG/DL (ref 8.3–10.6)
CALCIUM SERPL-MCNC: 7.5 MG/DL (ref 8.3–10.6)
CALCIUM SERPL-MCNC: 7.8 MG/DL (ref 8.3–10.6)
CALCIUM SERPL-MCNC: 8.8 MG/DL (ref 8.3–10.6)
CARBOXYHEMOGLOBIN ARTERIAL: 2.3 % (ref 0–1.5)
CHLORIDE BLD-SCNC: 102 MMOL/L (ref 99–110)
CHLORIDE BLD-SCNC: 102 MMOL/L (ref 99–110)
CHLORIDE BLD-SCNC: 104 MMOL/L (ref 99–110)
CHLORIDE BLD-SCNC: 104 MMOL/L (ref 99–110)
CHLORIDE BLD-SCNC: 105 MMOL/L (ref 99–110)
CHLORIDE BLD-SCNC: 106 MMOL/L (ref 99–110)
CHLORIDE BLD-SCNC: 108 MMOL/L (ref 99–110)
CHLORIDE BLD-SCNC: 108 MMOL/L (ref 99–110)
CHOLESTEROL, TOTAL: 135 MG/DL (ref 0–199)
CLARITY, POC: ABNORMAL
CLARITY: CLEAR
CLOT EVALUATION BAL: ABNORMAL
CO2: 23 MMOL/L (ref 21–32)
CO2: 25 MMOL/L (ref 21–32)
CO2: 26 MMOL/L (ref 21–32)
CO2: 27 MMOL/L (ref 21–32)
CO2: 27 MMOL/L (ref 21–32)
CO2: 28 MMOL/L (ref 21–32)
COLOR LAVAGE: COLORLESS
COLOR, POC: YELLOW
COLOR: YELLOW
CORTISOL TOTAL: 17.2 UG/DL
CREAT SERPL-MCNC: <0.5 MG/DL (ref 0.6–1.2)
CULTURE, BLOOD 2: NORMAL
CULTURE, RESPIRATORY: ABNORMAL
CULTURE, RESPIRATORY: ABNORMAL
EKG ATRIAL RATE: 90 BPM
EKG ATRIAL RATE: 97 BPM
EKG DIAGNOSIS: NORMAL
EKG DIAGNOSIS: NORMAL
EKG P AXIS: 47 DEGREES
EKG P AXIS: 57 DEGREES
EKG P-R INTERVAL: 140 MS
EKG P-R INTERVAL: 144 MS
EKG Q-T INTERVAL: 372 MS
EKG Q-T INTERVAL: 376 MS
EKG QRS DURATION: 70 MS
EKG QRS DURATION: 74 MS
EKG QTC CALCULATION (BAZETT): 455 MS
EKG QTC CALCULATION (BAZETT): 477 MS
EKG R AXIS: 55 DEGREES
EKG R AXIS: 55 DEGREES
EKG T AXIS: 40 DEGREES
EKG T AXIS: 76 DEGREES
EKG VENTRICULAR RATE: 90 BPM
EKG VENTRICULAR RATE: 97 BPM
EOSINOPHILS ABSOLUTE: 0 K/UL (ref 0–0.6)
EOSINOPHILS ABSOLUTE: 0.1 K/UL (ref 0–0.6)
EOSINOPHILS ABSOLUTE: 0.1 K/UL (ref 0–0.6)
EOSINOPHILS ABSOLUTE: 0.2 K/UL (ref 0–0.6)
EOSINOPHILS ABSOLUTE: 0.2 K/UL (ref 0–0.6)
EOSINOPHILS ABSOLUTE: 0.3 K/UL (ref 0–0.6)
EOSINOPHILS ABSOLUTE: 0.3 K/UL (ref 0–0.6)
EOSINOPHILS ABSOLUTE: 0.4 K/UL (ref 0–0.6)
EOSINOPHILS RELATIVE PERCENT: 0.1 %
EOSINOPHILS RELATIVE PERCENT: 0.8 %
EOSINOPHILS RELATIVE PERCENT: 1.4 %
EOSINOPHILS RELATIVE PERCENT: 2.4 %
EOSINOPHILS RELATIVE PERCENT: 3.6 %
EOSINOPHILS RELATIVE PERCENT: 3.8 %
EOSINOPHILS RELATIVE PERCENT: 4.2 %
EOSINOPHILS RELATIVE PERCENT: 5.3 %
EPITHELIAL CELLS, UA: ABNORMAL /HPF
FINAL REPORT: NORMAL
GFR AFRICAN AMERICAN: >60
GFR NON-AFRICAN AMERICAN: >60
GLOBULIN: 3.6 G/DL
GLUCOSE BLD-MCNC: 101 MG/DL (ref 70–99)
GLUCOSE BLD-MCNC: 105 MG/DL (ref 70–99)
GLUCOSE BLD-MCNC: 154 MG/DL (ref 70–99)
GLUCOSE BLD-MCNC: 64 MG/DL (ref 70–99)
GLUCOSE BLD-MCNC: 65 MG/DL (ref 70–99)
GLUCOSE BLD-MCNC: 78 MG/DL (ref 70–99)
GLUCOSE BLD-MCNC: 81 MG/DL (ref 70–99)
GLUCOSE BLD-MCNC: 83 MG/DL (ref 70–99)
GLUCOSE BLD-MCNC: 93 MG/DL (ref 70–99)
GLUCOSE URINE, POC: ABNORMAL
GLUCOSE URINE: NEGATIVE MG/DL
GRAM STAIN RESULT: ABNORMAL
HCO3 ARTERIAL: 24 MMOL/L (ref 21–29)
HCT VFR BLD CALC: 32.2 % (ref 36–48)
HCT VFR BLD CALC: 33 % (ref 36–48)
HCT VFR BLD CALC: 33.7 % (ref 36–48)
HCT VFR BLD CALC: 34.8 % (ref 36–48)
HCT VFR BLD CALC: 35.3 % (ref 36–48)
HCT VFR BLD CALC: 37.2 % (ref 36–48)
HCT VFR BLD CALC: 41.8 % (ref 36–48)
HCT VFR BLD CALC: 42.5 % (ref 36–48)
HDLC SERPL-MCNC: 47 MG/DL (ref 40–60)
HEMOGLOBIN, ART, EXTENDED: 11.5 G/DL
HEMOGLOBIN: 10.4 G/DL (ref 12–16)
HEMOGLOBIN: 10.7 G/DL (ref 12–16)
HEMOGLOBIN: 10.9 G/DL (ref 12–16)
HEMOGLOBIN: 11.2 G/DL (ref 12–16)
HEMOGLOBIN: 11.5 G/DL (ref 12–16)
HEMOGLOBIN: 11.6 G/DL (ref 12–16)
HEMOGLOBIN: 13.6 G/DL (ref 12–16)
HEMOGLOBIN: 13.9 G/DL (ref 12–16)
KETONES, POC: ABNORMAL
KETONES, URINE: ABNORMAL MG/DL
L. PNEUMOPHILA SEROGP 1 UR AG: NORMAL
LACTIC ACID: 0.7 MMOL/L (ref 0.4–2)
LDL CHOLESTEROL CALCULATED: 69 MG/DL
LEUKOCYTE EST, POC: ABNORMAL
LEUKOCYTE ESTERASE, URINE: ABNORMAL
LV EF: 65 %
LVEF MODALITY: NORMAL
LYMPHOCYTES ABSOLUTE: 1.1 K/UL (ref 1–5.1)
LYMPHOCYTES ABSOLUTE: 1.3 K/UL (ref 1–5.1)
LYMPHOCYTES ABSOLUTE: 1.5 K/UL (ref 1–5.1)
LYMPHOCYTES ABSOLUTE: 1.6 K/UL (ref 1–5.1)
LYMPHOCYTES ABSOLUTE: 1.7 K/UL (ref 1–5.1)
LYMPHOCYTES ABSOLUTE: 1.7 K/UL (ref 1–5.1)
LYMPHOCYTES RELATIVE PERCENT: 15.7 %
LYMPHOCYTES RELATIVE PERCENT: 16 %
LYMPHOCYTES RELATIVE PERCENT: 16.9 %
LYMPHOCYTES RELATIVE PERCENT: 18.5 %
LYMPHOCYTES RELATIVE PERCENT: 21.7 %
LYMPHOCYTES RELATIVE PERCENT: 24.1 %
LYMPHOCYTES RELATIVE PERCENT: 24.3 %
LYMPHOCYTES RELATIVE PERCENT: 9.6 %
LYMPHOCYTES, BAL: 1 % (ref 5–10)
MACROPHAGES, BAL: 4 % (ref 90–95)
MAGNESIUM: 1.7 MG/DL (ref 1.8–2.4)
MAGNESIUM: 1.7 MG/DL (ref 1.8–2.4)
MAGNESIUM: 1.8 MG/DL (ref 1.8–2.4)
MAGNESIUM: 1.8 MG/DL (ref 1.8–2.4)
MAGNESIUM: 2 MG/DL (ref 1.8–2.4)
MCH RBC QN AUTO: 30.7 PG (ref 26–34)
MCH RBC QN AUTO: 30.8 PG (ref 26–34)
MCH RBC QN AUTO: 30.9 PG (ref 26–34)
MCH RBC QN AUTO: 31.1 PG (ref 26–34)
MCH RBC QN AUTO: 31.2 PG (ref 26–34)
MCH RBC QN AUTO: 31.4 PG (ref 26–34)
MCHC RBC AUTO-ENTMCNC: 30.8 G/DL (ref 31–36)
MCHC RBC AUTO-ENTMCNC: 32.1 G/DL (ref 31–36)
MCHC RBC AUTO-ENTMCNC: 32.4 G/DL (ref 31–36)
MCHC RBC AUTO-ENTMCNC: 32.4 G/DL (ref 31–36)
MCHC RBC AUTO-ENTMCNC: 32.5 G/DL (ref 31–36)
MCHC RBC AUTO-ENTMCNC: 32.6 G/DL (ref 31–36)
MCHC RBC AUTO-ENTMCNC: 32.6 G/DL (ref 31–36)
MCHC RBC AUTO-ENTMCNC: 33 G/DL (ref 31–36)
MCV RBC AUTO: 100.3 FL (ref 80–100)
MCV RBC AUTO: 94.6 FL (ref 80–100)
MCV RBC AUTO: 94.8 FL (ref 80–100)
MCV RBC AUTO: 94.9 FL (ref 80–100)
MCV RBC AUTO: 95.3 FL (ref 80–100)
MCV RBC AUTO: 95.5 FL (ref 80–100)
MCV RBC AUTO: 96 FL (ref 80–100)
MCV RBC AUTO: 96.3 FL (ref 80–100)
METHEMOGLOBIN ARTERIAL: 0.2 % (ref 0–1.4)
MICROSCOPIC EXAMINATION: YES
MONOCYTES ABSOLUTE: 0.5 K/UL (ref 0–1.3)
MONOCYTES ABSOLUTE: 0.6 K/UL (ref 0–1.3)
MONOCYTES ABSOLUTE: 0.7 K/UL (ref 0–1.3)
MONOCYTES ABSOLUTE: 0.7 K/UL (ref 0–1.3)
MONOCYTES ABSOLUTE: 0.8 K/UL (ref 0–1.3)
MONOCYTES ABSOLUTE: 1 K/UL (ref 0–1.3)
MONOCYTES RELATIVE PERCENT: 10 %
MONOCYTES RELATIVE PERCENT: 10.1 %
MONOCYTES RELATIVE PERCENT: 7.4 %
MONOCYTES RELATIVE PERCENT: 7.7 %
MONOCYTES RELATIVE PERCENT: 7.8 %
MONOCYTES RELATIVE PERCENT: 7.9 %
MONOCYTES RELATIVE PERCENT: 8.4 %
MONOCYTES RELATIVE PERCENT: 8.9 %
MONOCYTES, BAL: 0 %
NEUTROPHILS ABSOLUTE: 10.7 K/UL (ref 1.7–7.7)
NEUTROPHILS ABSOLUTE: 4.2 K/UL (ref 1.7–7.7)
NEUTROPHILS ABSOLUTE: 4.4 K/UL (ref 1.7–7.7)
NEUTROPHILS ABSOLUTE: 4.9 K/UL (ref 1.7–7.7)
NEUTROPHILS ABSOLUTE: 5.5 K/UL (ref 1.7–7.7)
NEUTROPHILS ABSOLUTE: 5.5 K/UL (ref 1.7–7.7)
NEUTROPHILS ABSOLUTE: 5.6 K/UL (ref 1.7–7.7)
NEUTROPHILS ABSOLUTE: 6 K/UL (ref 1.7–7.7)
NEUTROPHILS RELATIVE PERCENT: 63.5 %
NEUTROPHILS RELATIVE PERCENT: 63.9 %
NEUTROPHILS RELATIVE PERCENT: 66.9 %
NEUTROPHILS RELATIVE PERCENT: 68.7 %
NEUTROPHILS RELATIVE PERCENT: 70.8 %
NEUTROPHILS RELATIVE PERCENT: 71.2 %
NEUTROPHILS RELATIVE PERCENT: 73.1 %
NEUTROPHILS RELATIVE PERCENT: 82.2 %
NITRITE, POC: ABNORMAL
NITRITE, URINE: POSITIVE
NUMBER OF CELLS COUNTED BAL (LAVAGE): 200
O2 SAT, ARTERIAL: 95 % (ref 93–100)
ORGANISM: ABNORMAL
PCO2 ARTERIAL: 39 MMHG (ref 35–45)
PDW BLD-RTO: 15.6 % (ref 12.4–15.4)
PDW BLD-RTO: 15.9 % (ref 12.4–15.4)
PDW BLD-RTO: 16 % (ref 12.4–15.4)
PDW BLD-RTO: 16.1 % (ref 12.4–15.4)
PDW BLD-RTO: 16.8 % (ref 12.4–15.4)
PERFORMED ON: ABNORMAL
PH ARTERIAL: 7.41 (ref 7.35–7.45)
PH UA: 7.5 (ref 5–8)
PH, POC: 8.5
PHENYTOIN DOSE AMOUNT: ABNORMAL
PHENYTOIN LEVEL: 8.2 UG/ML (ref 10–20)
PHOSPHORUS: 1.9 MG/DL (ref 2.5–4.9)
PHOSPHORUS: 1.9 MG/DL (ref 2.5–4.9)
PHOSPHORUS: 2.4 MG/DL (ref 2.5–4.9)
PHOSPHORUS: 2.5 MG/DL (ref 2.5–4.9)
PHOSPHORUS: 3.1 MG/DL (ref 2.5–4.9)
PHOSPHORUS: 3.4 MG/DL (ref 2.5–4.9)
PLATELET # BLD: 216 K/UL (ref 135–450)
PLATELET # BLD: 219 K/UL (ref 135–450)
PLATELET # BLD: 232 K/UL (ref 135–450)
PLATELET # BLD: 254 K/UL (ref 135–450)
PLATELET # BLD: 268 K/UL (ref 135–450)
PLATELET # BLD: 284 K/UL (ref 135–450)
PLATELET # BLD: 299 K/UL (ref 135–450)
PLATELET # BLD: 303 K/UL (ref 135–450)
PMV BLD AUTO: 8.7 FL (ref 5–10.5)
PMV BLD AUTO: 8.8 FL (ref 5–10.5)
PMV BLD AUTO: 8.9 FL (ref 5–10.5)
PMV BLD AUTO: 8.9 FL (ref 5–10.5)
PMV BLD AUTO: 9 FL (ref 5–10.5)
PMV BLD AUTO: 9.2 FL (ref 5–10.5)
PMV BLD AUTO: 9.4 FL (ref 5–10.5)
PMV BLD AUTO: 9.4 FL (ref 5–10.5)
PO2 ARTERIAL: 74.1 MMHG (ref 75–108)
POTASSIUM REFLEX MAGNESIUM: 2.7 MMOL/L (ref 3.5–5.1)
POTASSIUM REFLEX MAGNESIUM: 3.7 MMOL/L (ref 3.5–5.1)
POTASSIUM REFLEX MAGNESIUM: 3.7 MMOL/L (ref 3.5–5.1)
POTASSIUM SERPL-SCNC: 3.3 MMOL/L (ref 3.5–5.1)
POTASSIUM SERPL-SCNC: 3.4 MMOL/L (ref 3.5–5.1)
POTASSIUM SERPL-SCNC: 3.8 MMOL/L (ref 3.5–5.1)
POTASSIUM SERPL-SCNC: 4 MMOL/L (ref 3.5–5.1)
POTASSIUM SERPL-SCNC: 4.3 MMOL/L (ref 3.5–5.1)
POTASSIUM SERPL-SCNC: 4.9 MMOL/L (ref 3.5–5.1)
PREALBUMIN: 10.9 MG/DL (ref 20–40)
PREALBUMIN: 13.7 MG/DL (ref 20–40)
PREALBUMIN: 9.5 MG/DL (ref 20–40)
PRELIMINARY: NORMAL
PROTEIN UA: ABNORMAL MG/DL
PROTEIN, POC: 100
RAPID INFLUENZA  B AGN: NEGATIVE
RAPID INFLUENZA A AGN: NEGATIVE
RBC # BLD: 3.35 M/UL (ref 4–5.2)
RBC # BLD: 3.47 M/UL (ref 4–5.2)
RBC # BLD: 3.54 M/UL (ref 4–5.2)
RBC # BLD: 3.65 M/UL (ref 4–5.2)
RBC # BLD: 3.71 M/UL (ref 4–5.2)
RBC # BLD: 3.73 M/UL (ref 4–5.2)
RBC # BLD: 4.34 M/UL (ref 4–5.2)
RBC # BLD: 4.49 M/UL (ref 4–5.2)
RBC UA: ABNORMAL /HPF (ref 0–2)
RBC, BAL: 29 /CUMM
REASON FOR REJECTION: NORMAL
REJECTED TEST: NORMAL
REPORT: NORMAL
SEDIMENTATION RATE, ERYTHROCYTE: 87 MM/HR (ref 0–30)
SEGMENTED NEUTROPHILS, BAL: 95 % (ref 5–10)
SODIUM BLD-SCNC: 137 MMOL/L (ref 136–145)
SODIUM BLD-SCNC: 139 MMOL/L (ref 136–145)
SODIUM BLD-SCNC: 141 MMOL/L (ref 136–145)
SODIUM BLD-SCNC: 142 MMOL/L (ref 136–145)
SODIUM BLD-SCNC: 143 MMOL/L (ref 136–145)
SPECIFIC GRAVITY UA: 1.01 (ref 1–1.03)
SPECIFIC GRAVITY, POC: 1.02
STREP PNEUMONIAE ANTIGEN, URINE: NORMAL
T4 FREE: 0.7 NG/DL (ref 0.9–1.8)
TCO2 ARTERIAL: 25 MMOL/L
TOTAL PROTEIN: 6 G/DL (ref 6.4–8.2)
TOTAL PROTEIN: 6.1 G/DL (ref 6.4–8.2)
TOTAL PROTEIN: 8 G/DL (ref 6.4–8.2)
TRANSFERRIN: 110 MG/DL (ref 200–360)
TRIGL SERPL-MCNC: 94 MG/DL (ref 0–150)
TROPONIN: <0.01 NG/ML
TSH REFLEX: 6.2 UIU/ML (ref 0.27–4.2)
URINE CULTURE, ROUTINE: NORMAL
URINE TYPE: ABNORMAL
UROBILINOGEN, POC: 1
UROBILINOGEN, URINE: 1 E.U./DL
VANCOMYCIN TROUGH: 22.6 UG/ML (ref 10–20)
VLDLC SERPL CALC-MCNC: 19 MG/DL
WBC # BLD: 13.1 K/UL (ref 4–11)
WBC # BLD: 6.6 K/UL (ref 4–11)
WBC # BLD: 6.8 K/UL (ref 4–11)
WBC # BLD: 6.9 K/UL (ref 4–11)
WBC # BLD: 7.7 K/UL (ref 4–11)
WBC # BLD: 8 K/UL (ref 4–11)
WBC # BLD: 8.2 K/UL (ref 4–11)
WBC # BLD: 8.3 K/UL (ref 4–11)
WBC UA: ABNORMAL /HPF (ref 0–5)
WBC/EPI CELLS BAL: 96 /CUMM

## 2019-01-01 PROCEDURE — 6370000000 HC RX 637 (ALT 250 FOR IP): Performed by: INTERNAL MEDICINE

## 2019-01-01 PROCEDURE — 81002 URINALYSIS NONAUTO W/O SCOPE: CPT | Performed by: INTERNAL MEDICINE

## 2019-01-01 PROCEDURE — 2580000003 HC RX 258

## 2019-01-01 PROCEDURE — 6370000000 HC RX 637 (ALT 250 FOR IP): Performed by: STUDENT IN AN ORGANIZED HEALTH CARE EDUCATION/TRAINING PROGRAM

## 2019-01-01 PROCEDURE — 36415 COLL VENOUS BLD VENIPUNCTURE: CPT

## 2019-01-01 PROCEDURE — 1101F PT FALLS ASSESS-DOCD LE1/YR: CPT | Performed by: INTERNAL MEDICINE

## 2019-01-01 PROCEDURE — 6360000002 HC RX W HCPCS: Performed by: INTERNAL MEDICINE

## 2019-01-01 PROCEDURE — 4004F PT TOBACCO SCREEN RCVD TLK: CPT | Performed by: NURSE PRACTITIONER

## 2019-01-01 PROCEDURE — 88342 IMHCHEM/IMCYTCHM 1ST ANTB: CPT

## 2019-01-01 PROCEDURE — 94150 VITAL CAPACITY TEST: CPT

## 2019-01-01 PROCEDURE — 11045 DBRDMT SUBQ TISS EACH ADDL: CPT

## 2019-01-01 PROCEDURE — 1090F PRES/ABSN URINE INCON ASSESS: CPT | Performed by: INTERNAL MEDICINE

## 2019-01-01 PROCEDURE — 6360000002 HC RX W HCPCS: Performed by: STUDENT IN AN ORGANIZED HEALTH CARE EDUCATION/TRAINING PROGRAM

## 2019-01-01 PROCEDURE — 99214 OFFICE O/P EST MOD 30 MIN: CPT | Performed by: INTERNAL MEDICINE

## 2019-01-01 PROCEDURE — 99222 1ST HOSP IP/OBS MODERATE 55: CPT | Performed by: SURGERY

## 2019-01-01 PROCEDURE — 2060000000 HC ICU INTERMEDIATE R&B

## 2019-01-01 PROCEDURE — 2000000000 HC ICU R&B

## 2019-01-01 PROCEDURE — 3609012500 HC EGD DILATION BALLOON: Performed by: INTERNAL MEDICINE

## 2019-01-01 PROCEDURE — 2580000003 HC RX 258: Performed by: INTERNAL MEDICINE

## 2019-01-01 PROCEDURE — 83735 ASSAY OF MAGNESIUM: CPT

## 2019-01-01 PROCEDURE — 4040F PNEUMOC VAC/ADMIN/RCVD: CPT | Performed by: INTERNAL MEDICINE

## 2019-01-01 PROCEDURE — 3700000000 HC ANESTHESIA ATTENDED CARE: Performed by: INTERNAL MEDICINE

## 2019-01-01 PROCEDURE — 89220 SPUTUM SPECIMEN COLLECTION: CPT

## 2019-01-01 PROCEDURE — 2580000003 HC RX 258: Performed by: STUDENT IN AN ORGANIZED HEALTH CARE EDUCATION/TRAINING PROGRAM

## 2019-01-01 PROCEDURE — 2709999900 HC NON-CHARGEABLE SUPPLY: Performed by: INTERNAL MEDICINE

## 2019-01-01 PROCEDURE — 2700000000 HC OXYGEN THERAPY PER DAY

## 2019-01-01 PROCEDURE — 94667 MNPJ CHEST WALL 1ST: CPT

## 2019-01-01 PROCEDURE — 80076 HEPATIC FUNCTION PANEL: CPT

## 2019-01-01 PROCEDURE — 87186 SC STD MICRODIL/AGAR DIL: CPT

## 2019-01-01 PROCEDURE — G8482 FLU IMMUNIZE ORDER/ADMIN: HCPCS | Performed by: INTERNAL MEDICINE

## 2019-01-01 PROCEDURE — 94761 N-INVAS EAR/PLS OXIMETRY MLT: CPT

## 2019-01-01 PROCEDURE — 87081 CULTURE SCREEN ONLY: CPT

## 2019-01-01 PROCEDURE — 7100000010 HC PHASE II RECOVERY - FIRST 15 MIN: Performed by: INTERNAL MEDICINE

## 2019-01-01 PROCEDURE — 84100 ASSAY OF PHOSPHORUS: CPT

## 2019-01-01 PROCEDURE — 3017F COLORECTAL CA SCREEN DOC REV: CPT | Performed by: INTERNAL MEDICINE

## 2019-01-01 PROCEDURE — 85025 COMPLETE CBC W/AUTO DIFF WBC: CPT

## 2019-01-01 PROCEDURE — 7100000011 HC PHASE II RECOVERY - ADDTL 15 MIN: Performed by: INTERNAL MEDICINE

## 2019-01-01 PROCEDURE — 88305 TISSUE EXAM BY PATHOLOGIST: CPT

## 2019-01-01 PROCEDURE — 2720000010 HC SURG SUPPLY STERILE: Performed by: INTERNAL MEDICINE

## 2019-01-01 PROCEDURE — 94668 MNPJ CHEST WALL SBSQ: CPT

## 2019-01-01 PROCEDURE — 99214 OFFICE O/P EST MOD 30 MIN: CPT

## 2019-01-01 PROCEDURE — 99213 OFFICE O/P EST LOW 20 MIN: CPT | Performed by: SPECIALIST

## 2019-01-01 PROCEDURE — 87486 CHLMYD PNEUM DNA AMP PROBE: CPT

## 2019-01-01 PROCEDURE — 2500000003 HC RX 250 WO HCPCS: Performed by: NURSE ANESTHETIST, CERTIFIED REGISTERED

## 2019-01-01 PROCEDURE — 6360000002 HC RX W HCPCS

## 2019-01-01 PROCEDURE — 87581 M.PNEUMON DNA AMP PROBE: CPT

## 2019-01-01 PROCEDURE — 87798 DETECT AGENT NOS DNA AMP: CPT

## 2019-01-01 PROCEDURE — 4040F PNEUMOC VAC/ADMIN/RCVD: CPT | Performed by: NURSE PRACTITIONER

## 2019-01-01 PROCEDURE — 94640 AIRWAY INHALATION TREATMENT: CPT

## 2019-01-01 PROCEDURE — 6360000002 HC RX W HCPCS: Performed by: NURSE ANESTHETIST, CERTIFIED REGISTERED

## 2019-01-01 PROCEDURE — 11042 DBRDMT SUBQ TIS 1ST 20SQCM/<: CPT | Performed by: SPECIALIST

## 2019-01-01 PROCEDURE — 0DB68ZX EXCISION OF STOMACH, VIA NATURAL OR ARTIFICIAL OPENING ENDOSCOPIC, DIAGNOSTIC: ICD-10-PCS | Performed by: INTERNAL MEDICINE

## 2019-01-01 PROCEDURE — 2500000003 HC RX 250 WO HCPCS: Performed by: STUDENT IN AN ORGANIZED HEALTH CARE EDUCATION/TRAINING PROGRAM

## 2019-01-01 PROCEDURE — 87651 STREP A DNA AMP PROBE: CPT

## 2019-01-01 PROCEDURE — 36600 WITHDRAWAL OF ARTERIAL BLOOD: CPT

## 2019-01-01 PROCEDURE — 31624 DX BRONCHOSCOPE/LAVAGE: CPT | Performed by: INTERNAL MEDICINE

## 2019-01-01 PROCEDURE — 99214 OFFICE O/P EST MOD 30 MIN: CPT | Performed by: SPECIALIST

## 2019-01-01 PROCEDURE — 89051 BODY FLUID CELL COUNT: CPT

## 2019-01-01 PROCEDURE — 1123F ACP DISCUSS/DSCN MKR DOCD: CPT | Performed by: NURSE PRACTITIONER

## 2019-01-01 PROCEDURE — 1123F ACP DISCUSS/DSCN MKR DOCD: CPT | Performed by: INTERNAL MEDICINE

## 2019-01-01 PROCEDURE — C8929 TTE W OR WO FOL WCON,DOPPLER: HCPCS

## 2019-01-01 PROCEDURE — 2500000003 HC RX 250 WO HCPCS

## 2019-01-01 PROCEDURE — 31720 CLEARANCE OF AIRWAYS: CPT

## 2019-01-01 PROCEDURE — G8427 DOCREV CUR MEDS BY ELIG CLIN: HCPCS | Performed by: NURSE PRACTITIONER

## 2019-01-01 PROCEDURE — 99232 SBSQ HOSP IP/OBS MODERATE 35: CPT | Performed by: INTERNAL MEDICINE

## 2019-01-01 PROCEDURE — 87633 RESP VIRUS 12-25 TARGETS: CPT

## 2019-01-01 PROCEDURE — 80069 RENAL FUNCTION PANEL: CPT

## 2019-01-01 PROCEDURE — 87804 INFLUENZA ASSAY W/OPTIC: CPT

## 2019-01-01 PROCEDURE — 87070 CULTURE OTHR SPECIMN AEROBIC: CPT

## 2019-01-01 PROCEDURE — G8427 DOCREV CUR MEDS BY ELIG CLIN: HCPCS | Performed by: INTERNAL MEDICINE

## 2019-01-01 PROCEDURE — 80048 BASIC METABOLIC PNL TOTAL CA: CPT

## 2019-01-01 PROCEDURE — 3017F COLORECTAL CA SCREEN DOC REV: CPT | Performed by: NURSE PRACTITIONER

## 2019-01-01 PROCEDURE — 84439 ASSAY OF FREE THYROXINE: CPT

## 2019-01-01 PROCEDURE — 84132 ASSAY OF SERUM POTASSIUM: CPT

## 2019-01-01 PROCEDURE — 71275 CT ANGIOGRAPHY CHEST: CPT

## 2019-01-01 PROCEDURE — 86140 C-REACTIVE PROTEIN: CPT

## 2019-01-01 PROCEDURE — 80202 ASSAY OF VANCOMYCIN: CPT

## 2019-01-01 PROCEDURE — 84484 ASSAY OF TROPONIN QUANT: CPT

## 2019-01-01 PROCEDURE — G0439 PPPS, SUBSEQ VISIT: HCPCS | Performed by: INTERNAL MEDICINE

## 2019-01-01 PROCEDURE — 0BCB8ZZ EXTIRPATION OF MATTER FROM LEFT LOWER LOBE BRONCHUS, VIA NATURAL OR ARTIFICIAL OPENING ENDOSCOPIC: ICD-10-PCS | Performed by: INTERNAL MEDICINE

## 2019-01-01 PROCEDURE — 36592 COLLECT BLOOD FROM PICC: CPT

## 2019-01-01 PROCEDURE — G8419 CALC BMI OUT NRM PARAM NOF/U: HCPCS | Performed by: INTERNAL MEDICINE

## 2019-01-01 PROCEDURE — 3609012400 HC EGD TRANSORAL BIOPSY SINGLE/MULTIPLE: Performed by: INTERNAL MEDICINE

## 2019-01-01 PROCEDURE — G8420 CALC BMI NORM PARAMETERS: HCPCS | Performed by: INTERNAL MEDICINE

## 2019-01-01 PROCEDURE — 4004F PT TOBACCO SCREEN RCVD TLK: CPT | Performed by: INTERNAL MEDICINE

## 2019-01-01 PROCEDURE — 94664 DEMO&/EVAL PT USE INHALER: CPT

## 2019-01-01 PROCEDURE — 87641 MR-STAPH DNA AMP PROBE: CPT

## 2019-01-01 PROCEDURE — 84134 ASSAY OF PREALBUMIN: CPT

## 2019-01-01 PROCEDURE — 3700000001 HC ADD 15 MINUTES (ANESTHESIA): Performed by: INTERNAL MEDICINE

## 2019-01-01 PROCEDURE — 93005 ELECTROCARDIOGRAM TRACING: CPT | Performed by: STUDENT IN AN ORGANIZED HEALTH CARE EDUCATION/TRAINING PROGRAM

## 2019-01-01 PROCEDURE — 76705 ECHO EXAM OF ABDOMEN: CPT

## 2019-01-01 PROCEDURE — 99223 1ST HOSP IP/OBS HIGH 75: CPT | Performed by: INTERNAL MEDICINE

## 2019-01-01 PROCEDURE — 97161 PT EVAL LOW COMPLEX 20 MIN: CPT

## 2019-01-01 PROCEDURE — 1090F PRES/ABSN URINE INCON ASSESS: CPT | Performed by: NURSE PRACTITIONER

## 2019-01-01 PROCEDURE — 93010 ELECTROCARDIOGRAM REPORT: CPT | Performed by: INTERNAL MEDICINE

## 2019-01-01 PROCEDURE — 84466 ASSAY OF TRANSFERRIN: CPT

## 2019-01-01 PROCEDURE — 82803 BLOOD GASES ANY COMBINATION: CPT

## 2019-01-01 PROCEDURE — 51702 INSERT TEMP BLADDER CATH: CPT

## 2019-01-01 PROCEDURE — 99232 SBSQ HOSP IP/OBS MODERATE 35: CPT | Performed by: NURSE PRACTITIONER

## 2019-01-01 PROCEDURE — 11042 DBRDMT SUBQ TIS 1ST 20SQCM/<: CPT

## 2019-01-01 PROCEDURE — 85652 RBC SED RATE AUTOMATED: CPT

## 2019-01-01 PROCEDURE — 87205 SMEAR GRAM STAIN: CPT

## 2019-01-01 PROCEDURE — 0DB28ZX EXCISION OF MIDDLE ESOPHAGUS, VIA NATURAL OR ARTIFICIAL OPENING ENDOSCOPIC, DIAGNOSTIC: ICD-10-PCS | Performed by: INTERNAL MEDICINE

## 2019-01-01 PROCEDURE — 11045 DBRDMT SUBQ TISS EACH ADDL: CPT | Performed by: SPECIALIST

## 2019-01-01 PROCEDURE — 6360000004 HC RX CONTRAST MEDICATION: Performed by: STUDENT IN AN ORGANIZED HEALTH CARE EDUCATION/TRAINING PROGRAM

## 2019-01-01 PROCEDURE — 84443 ASSAY THYROID STIM HORMONE: CPT

## 2019-01-01 PROCEDURE — 97165 OT EVAL LOW COMPLEX 30 MIN: CPT

## 2019-01-01 PROCEDURE — G8400 PT W/DXA NO RESULTS DOC: HCPCS | Performed by: NURSE PRACTITIONER

## 2019-01-01 PROCEDURE — 71045 X-RAY EXAM CHEST 1 VIEW: CPT

## 2019-01-01 PROCEDURE — 87077 CULTURE AEROBIC IDENTIFY: CPT

## 2019-01-01 PROCEDURE — 82533 TOTAL CORTISOL: CPT

## 2019-01-01 PROCEDURE — 99221 1ST HOSP IP/OBS SF/LOW 40: CPT | Performed by: NURSE PRACTITIONER

## 2019-01-01 PROCEDURE — C9113 INJ PANTOPRAZOLE SODIUM, VIA: HCPCS | Performed by: INTERNAL MEDICINE

## 2019-01-01 PROCEDURE — 2580000003 HC RX 258: Performed by: NURSE ANESTHETIST, CERTIFIED REGISTERED

## 2019-01-01 PROCEDURE — 87449 NOS EACH ORGANISM AG IA: CPT

## 2019-01-01 PROCEDURE — 99233 SBSQ HOSP IP/OBS HIGH 50: CPT | Performed by: INTERNAL MEDICINE

## 2019-01-01 PROCEDURE — G8400 PT W/DXA NO RESULTS DOC: HCPCS | Performed by: INTERNAL MEDICINE

## 2019-01-01 PROCEDURE — 87541 LEGION PNEUMO DNA AMP PROB: CPT

## 2019-01-01 PROCEDURE — 6370000000 HC RX 637 (ALT 250 FOR IP): Performed by: SPECIALIST

## 2019-01-01 PROCEDURE — 36591 DRAW BLOOD OFF VENOUS DEVICE: CPT

## 2019-01-01 PROCEDURE — 81001 URINALYSIS AUTO W/SCOPE: CPT

## 2019-01-01 PROCEDURE — 99204 OFFICE O/P NEW MOD 45 MIN: CPT | Performed by: INTERNAL MEDICINE

## 2019-01-01 PROCEDURE — 93005 ELECTROCARDIOGRAM TRACING: CPT | Performed by: INTERNAL MEDICINE

## 2019-01-01 PROCEDURE — 74177 CT ABD & PELVIS W/CONTRAST: CPT

## 2019-01-01 PROCEDURE — 99213 OFFICE O/P EST LOW 20 MIN: CPT | Performed by: NURSE PRACTITIONER

## 2019-01-01 PROCEDURE — C1726 CATH, BAL DIL, NON-VASCULAR: HCPCS | Performed by: INTERNAL MEDICINE

## 2019-01-01 PROCEDURE — 87040 BLOOD CULTURE FOR BACTERIA: CPT

## 2019-01-01 PROCEDURE — G8420 CALC BMI NORM PARAMETERS: HCPCS | Performed by: NURSE PRACTITIONER

## 2019-01-01 PROCEDURE — 0B9J8ZX DRAINAGE OF LEFT LOWER LUNG LOBE, VIA NATURAL OR ARTIFICIAL OPENING ENDOSCOPIC, DIAGNOSTIC: ICD-10-PCS | Performed by: INTERNAL MEDICINE

## 2019-01-01 PROCEDURE — 83605 ASSAY OF LACTIC ACID: CPT

## 2019-01-01 RX ORDER — PROMETHAZINE HYDROCHLORIDE 25 MG/1
12.5 TABLET ORAL EVERY 4 HOURS PRN
Status: DISCONTINUED | OUTPATIENT
Start: 2019-01-01 | End: 2019-01-01 | Stop reason: HOSPADM

## 2019-01-01 RX ORDER — PHENYTOIN 50 MG/1
100 TABLET, CHEWABLE ORAL 3 TIMES DAILY
Status: DISCONTINUED | OUTPATIENT
Start: 2019-01-01 | End: 2019-01-01 | Stop reason: HOSPADM

## 2019-01-01 RX ORDER — POTASSIUM CHLORIDE 1.5 G/1.77G
40 POWDER, FOR SOLUTION ORAL ONCE
Status: COMPLETED | OUTPATIENT
Start: 2019-01-01 | End: 2019-01-01

## 2019-01-01 RX ORDER — FLUOXETINE HYDROCHLORIDE 40 MG/1
40 CAPSULE ORAL DAILY
COMMUNITY
End: 2019-01-01 | Stop reason: CLARIF

## 2019-01-01 RX ORDER — POTASSIUM CHLORIDE 20 MEQ/1
20 TABLET, EXTENDED RELEASE ORAL ONCE
Status: COMPLETED | OUTPATIENT
Start: 2019-01-01 | End: 2019-01-01

## 2019-01-01 RX ORDER — IPRATROPIUM BROMIDE AND ALBUTEROL SULFATE 2.5; .5 MG/3ML; MG/3ML
1 SOLUTION RESPIRATORY (INHALATION)
Status: DISCONTINUED | OUTPATIENT
Start: 2019-01-01 | End: 2019-01-01 | Stop reason: HOSPADM

## 2019-01-01 RX ORDER — TIZANIDINE 4 MG/1
4 TABLET ORAL 2 TIMES DAILY WITH MEALS
Status: DISCONTINUED | OUTPATIENT
Start: 2019-01-01 | End: 2019-01-01 | Stop reason: HOSPADM

## 2019-01-01 RX ORDER — OXYCODONE HYDROCHLORIDE 5 MG/1
5 TABLET ORAL EVERY 8 HOURS PRN
Status: DISCONTINUED | OUTPATIENT
Start: 2019-01-01 | End: 2019-01-01 | Stop reason: HOSPADM

## 2019-01-01 RX ORDER — TIZANIDINE 4 MG/1
4 TABLET ORAL 2 TIMES DAILY
Qty: 180 TABLET | Refills: 1 | Status: SHIPPED | OUTPATIENT
Start: 2019-01-01

## 2019-01-01 RX ORDER — SODIUM CHLORIDE 9 MG/ML
INJECTION, SOLUTION INTRAVENOUS CONTINUOUS
Status: ACTIVE | OUTPATIENT
Start: 2019-01-01 | End: 2019-01-01

## 2019-01-01 RX ORDER — 0.9 % SODIUM CHLORIDE 0.9 %
500 INTRAVENOUS SOLUTION INTRAVENOUS ONCE
Status: COMPLETED | OUTPATIENT
Start: 2019-01-01 | End: 2019-01-01

## 2019-01-01 RX ORDER — OXYCODONE HYDROCHLORIDE AND ACETAMINOPHEN 325; 5 MG/5ML; MG/5ML
5 SOLUTION ORAL EVERY 12 HOURS PRN
Qty: 50 ML | Refills: 0 | Status: SHIPPED | OUTPATIENT
Start: 2019-01-01 | End: 2019-01-01 | Stop reason: SDUPTHER

## 2019-01-01 RX ORDER — OXYCODONE HYDROCHLORIDE 5 MG/1
5 TABLET ORAL EVERY 6 HOURS
Status: DISCONTINUED | OUTPATIENT
Start: 2019-01-01 | End: 2019-01-01

## 2019-01-01 RX ORDER — LORAZEPAM 0.5 MG/1
0.5 TABLET ORAL 3 TIMES DAILY PRN
Qty: 15 TABLET | Refills: 0 | Status: SHIPPED | OUTPATIENT
Start: 2019-01-01 | End: 2019-01-01

## 2019-01-01 RX ORDER — LIDOCAINE HYDROCHLORIDE 10 MG/ML
INJECTION, SOLUTION INFILTRATION; PERINEURAL PRN
Status: DISCONTINUED | OUTPATIENT
Start: 2019-01-01 | End: 2019-01-01 | Stop reason: SDUPTHER

## 2019-01-01 RX ORDER — POLYETHYLENE GLYCOL 3350 17 G/17G
17 POWDER, FOR SOLUTION ORAL DAILY PRN
COMMUNITY
Start: 2018-06-10

## 2019-01-01 RX ORDER — FERROUS SULFATE 325(65) MG
220 TABLET ORAL
COMMUNITY

## 2019-01-01 RX ORDER — AMOXICILLIN AND CLAVULANATE POTASSIUM 875; 125 MG/1; MG/1
1 TABLET, FILM COATED ORAL 2 TIMES DAILY
Qty: 20 TABLET | Refills: 2 | Status: SHIPPED | OUTPATIENT
Start: 2019-01-01 | End: 2019-01-01

## 2019-01-01 RX ORDER — BUDESONIDE 0.5 MG/2ML
0.5 INHALANT ORAL 2 TIMES DAILY
Status: DISCONTINUED | OUTPATIENT
Start: 2019-01-01 | End: 2019-01-01 | Stop reason: HOSPADM

## 2019-01-01 RX ORDER — OXYCODONE HYDROCHLORIDE 15 MG/1
1 TABLET, FILM COATED, EXTENDED RELEASE ORAL EVERY 12 HOURS
Qty: 60 TABLET | Refills: 0 | Status: SHIPPED | OUTPATIENT
Start: 2019-01-01 | End: 2019-01-01 | Stop reason: SDUPTHER

## 2019-01-01 RX ORDER — MIRTAZAPINE 15 MG/1
15 TABLET, FILM COATED ORAL NIGHTLY
Status: DISCONTINUED | OUTPATIENT
Start: 2019-01-01 | End: 2019-01-01 | Stop reason: HOSPADM

## 2019-01-01 RX ORDER — ALBUTEROL SULFATE 2.5 MG/3ML
2.5 SOLUTION RESPIRATORY (INHALATION) EVERY 6 HOURS PRN
Qty: 120 EACH | Refills: 0 | Status: ON HOLD | OUTPATIENT
Start: 2019-01-01 | End: 2019-01-01 | Stop reason: ALTCHOICE

## 2019-01-01 RX ORDER — M-VIT,TX,IRON,MINS/CALC/FOLIC 27MG-0.4MG
1 TABLET ORAL DAILY
COMMUNITY

## 2019-01-01 RX ORDER — CASTOR OIL AND BALSAM, PERU 788; 87 MG/G; MG/G
OINTMENT TOPICAL PRN
Status: ON HOLD | COMMUNITY
Start: 2018-06-08 | End: 2019-01-01 | Stop reason: ALTCHOICE

## 2019-01-01 RX ORDER — LORAZEPAM 2 MG/ML
0.5 INJECTION INTRAMUSCULAR ONCE
Status: COMPLETED | OUTPATIENT
Start: 2019-01-01 | End: 2019-01-01

## 2019-01-01 RX ORDER — SODIUM CHLORIDE 9 MG/ML
INJECTION, SOLUTION INTRAVENOUS
Status: COMPLETED
Start: 2019-01-01 | End: 2019-01-01

## 2019-01-01 RX ORDER — OXYCODONE HCL 10 MG/1
10 TABLET, FILM COATED, EXTENDED RELEASE ORAL EVERY 8 HOURS
Status: DISCONTINUED | OUTPATIENT
Start: 2019-01-01 | End: 2019-01-01 | Stop reason: ALTCHOICE

## 2019-01-01 RX ORDER — LIDOCAINE HYDROCHLORIDE 40 MG/ML
2.5 SOLUTION TOPICAL ONCE
Status: COMPLETED | OUTPATIENT
Start: 2019-01-01 | End: 2019-01-01

## 2019-01-01 RX ORDER — OXYCODONE HYDROCHLORIDE 5 MG/1
5 TABLET ORAL ONCE
Status: COMPLETED | OUTPATIENT
Start: 2019-01-01 | End: 2019-01-01

## 2019-01-01 RX ORDER — FOLIC ACID 1 MG/1
TABLET ORAL
Qty: 90 TABLET | Refills: 2 | Status: SHIPPED | OUTPATIENT
Start: 2019-01-01

## 2019-01-01 RX ORDER — BACLOFEN 10 MG/1
10 TABLET ORAL 3 TIMES DAILY
Qty: 270 TABLET | Refills: 1 | Status: SHIPPED | OUTPATIENT
Start: 2019-01-01

## 2019-01-01 RX ORDER — FAMOTIDINE 20 MG/1
20 TABLET, FILM COATED ORAL 2 TIMES DAILY
Status: DISCONTINUED | OUTPATIENT
Start: 2019-01-01 | End: 2019-01-01

## 2019-01-01 RX ORDER — AMOXICILLIN AND CLAVULANATE POTASSIUM 875; 125 MG/1; MG/1
1 TABLET, FILM COATED ORAL 2 TIMES DAILY WITH MEALS
Qty: 20 TABLET | Refills: 0 | Status: SHIPPED | OUTPATIENT
Start: 2019-01-01 | End: 2019-01-01

## 2019-01-01 RX ORDER — PROMETHAZINE HYDROCHLORIDE 25 MG/1
12.5 TABLET ORAL EVERY 4 HOURS PRN
Status: DISCONTINUED | OUTPATIENT
Start: 2019-01-01 | End: 2019-01-01

## 2019-01-01 RX ORDER — PHENYTOIN SODIUM 100 MG/1
CAPSULE, EXTENDED RELEASE ORAL
Qty: 270 CAPSULE | Refills: 2 | Status: SHIPPED | OUTPATIENT
Start: 2019-01-01

## 2019-01-01 RX ORDER — GABAPENTIN 600 MG/1
TABLET ORAL
Qty: 270 TABLET | Refills: 0 | OUTPATIENT
Start: 2019-01-01 | End: 2019-01-01

## 2019-01-01 RX ORDER — BACLOFEN 10 MG/1
10 TABLET ORAL 3 TIMES DAILY
Status: DISCONTINUED | OUTPATIENT
Start: 2019-01-01 | End: 2019-01-01

## 2019-01-01 RX ORDER — QUETIAPINE FUMARATE 300 MG/1
600 TABLET, FILM COATED ORAL NIGHTLY
Status: DISCONTINUED | OUTPATIENT
Start: 2019-01-01 | End: 2019-01-01

## 2019-01-01 RX ORDER — DIAZEPAM 10 MG/1
10 TABLET ORAL EVERY EVENING
Qty: 10 TABLET | Refills: 0 | Status: SHIPPED | OUTPATIENT
Start: 2019-01-01 | End: 2019-01-01

## 2019-01-01 RX ORDER — SODIUM CHLORIDE, SODIUM LACTATE, POTASSIUM CHLORIDE, CALCIUM CHLORIDE 600; 310; 30; 20 MG/100ML; MG/100ML; MG/100ML; MG/100ML
INJECTION, SOLUTION INTRAVENOUS CONTINUOUS PRN
Status: DISCONTINUED | OUTPATIENT
Start: 2019-01-01 | End: 2019-01-01 | Stop reason: SDUPTHER

## 2019-01-01 RX ORDER — PROMETHAZINE HYDROCHLORIDE 25 MG/1
12.5 TABLET ORAL
Qty: 32 TABLET | Refills: 0 | Status: SHIPPED | OUTPATIENT
Start: 2019-01-01 | End: 2019-01-01

## 2019-01-01 RX ORDER — CEPHALEXIN 500 MG/1
500 CAPSULE ORAL 4 TIMES DAILY
Qty: 40 CAPSULE | Refills: 0 | Status: ON HOLD | OUTPATIENT
Start: 2019-01-01 | End: 2019-01-01 | Stop reason: ALTCHOICE

## 2019-01-01 RX ORDER — SODIUM CHLORIDE 9 MG/ML
INJECTION, SOLUTION INTRAVENOUS CONTINUOUS
Status: DISCONTINUED | OUTPATIENT
Start: 2019-01-01 | End: 2019-01-01 | Stop reason: HOSPADM

## 2019-01-01 RX ORDER — AMOXICILLIN AND CLAVULANATE POTASSIUM 875; 125 MG/1; MG/1
1 TABLET, FILM COATED ORAL 2 TIMES DAILY WITH MEALS
Qty: 20 TABLET | Refills: 2 | Status: SHIPPED | OUTPATIENT
Start: 2019-01-01 | End: 2019-01-01

## 2019-01-01 RX ORDER — SODIUM CHLORIDE 0.9 % (FLUSH) 0.9 %
10 SYRINGE (ML) INJECTION EVERY 12 HOURS SCHEDULED
Status: DISCONTINUED | OUTPATIENT
Start: 2019-01-01 | End: 2019-01-01 | Stop reason: HOSPADM

## 2019-01-01 RX ORDER — ONDANSETRON HYDROCHLORIDE 8 MG/1
8 TABLET, FILM COATED ORAL 2 TIMES DAILY PRN
Status: ON HOLD | COMMUNITY
End: 2019-01-01 | Stop reason: ALTCHOICE

## 2019-01-01 RX ORDER — POLYETHYLENE GLYCOL 3350 17 G/17G
17 POWDER, FOR SOLUTION ORAL DAILY
Status: DISCONTINUED | OUTPATIENT
Start: 2019-01-01 | End: 2019-01-01

## 2019-01-01 RX ORDER — MIDAZOLAM HYDROCHLORIDE 1 MG/ML
INJECTION INTRAMUSCULAR; INTRAVENOUS
Status: COMPLETED
Start: 2019-01-01 | End: 2019-01-01

## 2019-01-01 RX ORDER — LORAZEPAM 0.5 MG/1
0.5 TABLET ORAL 3 TIMES DAILY PRN
Qty: 30 TABLET | Refills: 0 | Status: SHIPPED | OUTPATIENT
Start: 2019-01-01 | End: 2019-01-01

## 2019-01-01 RX ORDER — CLINDAMYCIN HYDROCHLORIDE 300 MG/1
300 CAPSULE ORAL 4 TIMES DAILY
Qty: 40 CAPSULE | Refills: 0 | Status: SHIPPED | OUTPATIENT
Start: 2019-01-01 | End: 2019-01-01

## 2019-01-01 RX ORDER — OXYCODONE HCL 5 MG/5 ML
5 SOLUTION, ORAL ORAL EVERY 4 HOURS PRN
Qty: 50 ML | Refills: 0 | Status: SHIPPED | OUTPATIENT
Start: 2019-01-01 | End: 2019-01-01

## 2019-01-01 RX ORDER — GABAPENTIN 300 MG/1
600 CAPSULE ORAL 3 TIMES DAILY
Status: DISCONTINUED | OUTPATIENT
Start: 2019-01-01 | End: 2019-01-01

## 2019-01-01 RX ORDER — POTASSIUM CHLORIDE 29.8 MG/ML
20 INJECTION INTRAVENOUS
Status: COMPLETED | OUTPATIENT
Start: 2019-01-01 | End: 2019-01-01

## 2019-01-01 RX ORDER — QUETIAPINE FUMARATE 300 MG/1
400 TABLET, FILM COATED ORAL NIGHTLY
COMMUNITY

## 2019-01-01 RX ORDER — ALBUTEROL SULFATE 2.5 MG/3ML
2.5 SOLUTION RESPIRATORY (INHALATION) EVERY 6 HOURS PRN
Status: DISCONTINUED | OUTPATIENT
Start: 2019-01-01 | End: 2019-01-01 | Stop reason: HOSPADM

## 2019-01-01 RX ORDER — PRAVASTATIN SODIUM 20 MG
20 TABLET ORAL DAILY
Qty: 90 TABLET | Refills: 2 | Status: SHIPPED | OUTPATIENT
Start: 2019-01-01 | End: 2019-01-01

## 2019-01-01 RX ORDER — PROPOFOL 10 MG/ML
INJECTION, EMULSION INTRAVENOUS PRN
Status: DISCONTINUED | OUTPATIENT
Start: 2019-01-01 | End: 2019-01-01 | Stop reason: SDUPTHER

## 2019-01-01 RX ORDER — PROMETHAZINE HYDROCHLORIDE 25 MG/1
25 TABLET ORAL EVERY 6 HOURS PRN
COMMUNITY
End: 2019-01-01 | Stop reason: SDUPTHER

## 2019-01-01 RX ORDER — LIDOCAINE HYDROCHLORIDE 10 MG/ML
INJECTION, SOLUTION EPIDURAL; INFILTRATION; INTRACAUDAL; PERINEURAL
Status: COMPLETED
Start: 2019-01-01 | End: 2019-01-01

## 2019-01-01 RX ORDER — OXYCODONE HYDROCHLORIDE 15 MG/1
15 TABLET, FILM COATED, EXTENDED RELEASE ORAL EVERY 12 HOURS
Status: DISCONTINUED | OUTPATIENT
Start: 2019-01-01 | End: 2019-01-01 | Stop reason: ALTCHOICE

## 2019-01-01 RX ORDER — LIDOCAINE HYDROCHLORIDE 40 MG/ML
2.5 SOLUTION TOPICAL ONCE
Status: DISCONTINUED | OUTPATIENT
Start: 2019-01-01 | End: 2019-01-01 | Stop reason: HOSPADM

## 2019-01-01 RX ORDER — AMOXICILLIN AND CLAVULANATE POTASSIUM 250; 125 MG/1; MG/1
1 TABLET, FILM COATED ORAL 2 TIMES DAILY
Qty: 20 TABLET | Refills: 2 | Status: SHIPPED | OUTPATIENT
Start: 2019-01-01 | End: 2019-01-01

## 2019-01-01 RX ORDER — PROPOFOL 10 MG/ML
INJECTION, EMULSION INTRAVENOUS CONTINUOUS PRN
Status: DISCONTINUED | OUTPATIENT
Start: 2019-01-01 | End: 2019-01-01 | Stop reason: SDUPTHER

## 2019-01-01 RX ORDER — AMOXICILLIN AND CLAVULANATE POTASSIUM 875; 125 MG/1; MG/1
1 TABLET, FILM COATED ORAL 2 TIMES DAILY
Qty: 20 TABLET | Refills: 2 | Status: SHIPPED | OUTPATIENT
Start: 2019-01-01 | End: 2019-01-01 | Stop reason: SDUPTHER

## 2019-01-01 RX ORDER — FOLIC ACID 1 MG/1
1 TABLET ORAL
Status: DISCONTINUED | OUTPATIENT
Start: 2019-01-01 | End: 2019-01-01

## 2019-01-01 RX ORDER — OXYCODONE HYDROCHLORIDE AND ACETAMINOPHEN 325; 5 MG/5ML; MG/5ML
5 SOLUTION ORAL EVERY 12 HOURS PRN
Qty: 50 ML | Refills: 0 | Status: SHIPPED | OUTPATIENT
Start: 2019-01-01 | End: 2019-01-01

## 2019-01-01 RX ORDER — OXYCODONE HYDROCHLORIDE 15 MG/1
1 TABLET, FILM COATED, EXTENDED RELEASE ORAL EVERY 12 HOURS
COMMUNITY
End: 2019-01-01 | Stop reason: SDUPTHER

## 2019-01-01 RX ORDER — OXYCODONE HYDROCHLORIDE 15 MG/1
1 TABLET, FILM COATED, EXTENDED RELEASE ORAL EVERY 12 HOURS
Qty: 60 TABLET | Refills: 0 | Status: SHIPPED | OUTPATIENT
Start: 2019-01-01 | End: 2019-09-12

## 2019-01-01 RX ORDER — GABAPENTIN 300 MG/1
600 CAPSULE ORAL 3 TIMES DAILY
Status: DISCONTINUED | OUTPATIENT
Start: 2019-01-01 | End: 2019-01-01 | Stop reason: HOSPADM

## 2019-01-01 RX ORDER — DIAZEPAM 10 MG/1
10 TABLET ORAL EVERY EVENING
Status: DISCONTINUED | OUTPATIENT
Start: 2019-01-01 | End: 2019-01-01 | Stop reason: HOSPADM

## 2019-01-01 RX ORDER — POLYETHYLENE GLYCOL 3350 17 G/17G
17 POWDER, FOR SOLUTION ORAL DAILY
Status: DISCONTINUED | OUTPATIENT
Start: 2019-01-01 | End: 2019-01-01 | Stop reason: HOSPADM

## 2019-01-01 RX ORDER — SODIUM CHLORIDE 9 MG/ML
INJECTION, SOLUTION INTRAVENOUS CONTINUOUS
Status: DISPENSED | OUTPATIENT
Start: 2019-01-01 | End: 2019-01-01

## 2019-01-01 RX ORDER — METHYLPREDNISOLONE 4 MG/1
4 TABLET ORAL SEE ADMIN INSTRUCTIONS
Qty: 1 KIT | Refills: 0 | Status: SHIPPED | OUTPATIENT
Start: 2019-01-01 | End: 2019-01-01

## 2019-01-01 RX ORDER — FENTANYL CITRATE 50 UG/ML
INJECTION, SOLUTION INTRAMUSCULAR; INTRAVENOUS
Status: COMPLETED
Start: 2019-01-01 | End: 2019-01-01

## 2019-01-01 RX ORDER — SUCRALFATE ORAL 1 G/10ML
1 SUSPENSION ORAL
Qty: 1200 ML | Refills: 0 | Status: SHIPPED | OUTPATIENT
Start: 2019-01-01 | End: 2019-01-01 | Stop reason: SDUPTHER

## 2019-01-01 RX ORDER — MIRTAZAPINE 15 MG/1
15 TABLET, FILM COATED ORAL NIGHTLY
Qty: 30 TABLET | Refills: 0 | Status: SHIPPED | OUTPATIENT
Start: 2019-01-01 | End: 2019-01-01

## 2019-01-01 RX ORDER — PANTOPRAZOLE SODIUM 40 MG/1
40 TABLET, DELAYED RELEASE ORAL
Qty: 60 TABLET | Refills: 0 | Status: SHIPPED | OUTPATIENT
Start: 2019-01-01 | End: 2019-01-01

## 2019-01-01 RX ORDER — PANTOPRAZOLE SODIUM 40 MG/10ML
40 INJECTION, POWDER, LYOPHILIZED, FOR SOLUTION INTRAVENOUS 2 TIMES DAILY
Status: DISCONTINUED | OUTPATIENT
Start: 2019-01-01 | End: 2019-01-01 | Stop reason: HOSPADM

## 2019-01-01 RX ORDER — FLUOXETINE HYDROCHLORIDE 40 MG/1
40 CAPSULE ORAL 2 TIMES DAILY
COMMUNITY

## 2019-01-01 RX ORDER — SODIUM CHLORIDE 0.9 % (FLUSH) 0.9 %
10 SYRINGE (ML) INJECTION PRN
Status: DISCONTINUED | OUTPATIENT
Start: 2019-01-01 | End: 2019-01-01 | Stop reason: HOSPADM

## 2019-01-01 RX ORDER — OMEPRAZOLE 20 MG/1
20 CAPSULE, DELAYED RELEASE ORAL
Qty: 90 CAPSULE | Refills: 2 | Status: SHIPPED | OUTPATIENT
Start: 2019-01-01

## 2019-01-01 RX ORDER — ONDANSETRON 2 MG/ML
4 INJECTION INTRAMUSCULAR; INTRAVENOUS EVERY 6 HOURS PRN
Status: DISCONTINUED | OUTPATIENT
Start: 2019-01-01 | End: 2019-01-01 | Stop reason: HOSPADM

## 2019-01-01 RX ORDER — LORAZEPAM 0.5 MG/1
0.5 TABLET ORAL 3 TIMES DAILY PRN
Status: DISCONTINUED | OUTPATIENT
Start: 2019-01-01 | End: 2019-01-01 | Stop reason: HOSPADM

## 2019-01-01 RX ORDER — FOLIC ACID 1 MG/1
1 TABLET ORAL
Status: DISCONTINUED | OUTPATIENT
Start: 2019-01-01 | End: 2019-01-01 | Stop reason: HOSPADM

## 2019-01-01 RX ORDER — AMOXICILLIN AND CLAVULANATE POTASSIUM 250; 125 MG/1; MG/1
1 TABLET, FILM COATED ORAL 2 TIMES DAILY
COMMUNITY
End: 2019-01-01

## 2019-01-01 RX ORDER — HEPARIN SODIUM (PORCINE) LOCK FLUSH IV SOLN 100 UNIT/ML 100 UNIT/ML
100 SOLUTION INTRAVENOUS PRN
Status: DISCONTINUED | OUTPATIENT
Start: 2019-01-01 | End: 2019-01-01 | Stop reason: HOSPADM

## 2019-01-01 RX ORDER — BACLOFEN 10 MG/1
10 TABLET ORAL 3 TIMES DAILY PRN
Status: DISCONTINUED | OUTPATIENT
Start: 2019-01-01 | End: 2019-01-01 | Stop reason: HOSPADM

## 2019-01-01 RX ORDER — CLINDAMYCIN HYDROCHLORIDE 300 MG/1
300 CAPSULE ORAL 4 TIMES DAILY
Qty: 40 CAPSULE | Refills: 0 | Status: SHIPPED | OUTPATIENT
Start: 2019-01-01 | End: 2019-01-01 | Stop reason: SDUPTHER

## 2019-01-01 RX ORDER — MAGNESIUM SULFATE IN WATER 40 MG/ML
2 INJECTION, SOLUTION INTRAVENOUS ONCE
Status: COMPLETED | OUTPATIENT
Start: 2019-01-01 | End: 2019-01-01

## 2019-01-01 RX ORDER — PROMETHAZINE HYDROCHLORIDE 25 MG/1
25 TABLET ORAL EVERY 8 HOURS PRN
Qty: 54 TABLET | Refills: 0 | Status: SHIPPED | OUTPATIENT
Start: 2019-01-01 | End: 2019-01-01 | Stop reason: SDUPTHER

## 2019-01-01 RX ORDER — FLUOXETINE HYDROCHLORIDE 20 MG/1
40 CAPSULE ORAL 2 TIMES DAILY
Status: DISCONTINUED | OUTPATIENT
Start: 2019-01-01 | End: 2019-01-01 | Stop reason: HOSPADM

## 2019-01-01 RX ORDER — OXYCODONE HYDROCHLORIDE 15 MG/1
1 TABLET, FILM COATED, EXTENDED RELEASE ORAL EVERY 12 HOURS
Qty: 60 TABLET | Refills: 0 | Status: SHIPPED | OUTPATIENT
Start: 2019-01-01 | End: 2019-01-01

## 2019-01-01 RX ORDER — PHENYTOIN SODIUM 100 MG/1
200 CAPSULE, EXTENDED RELEASE ORAL NIGHTLY
Status: DISCONTINUED | OUTPATIENT
Start: 2019-01-01 | End: 2019-01-01 | Stop reason: ALTCHOICE

## 2019-01-01 RX ORDER — OXYCODONE HYDROCHLORIDE 5 MG/1
5 TABLET ORAL EVERY 4 HOURS
Status: DISCONTINUED | OUTPATIENT
Start: 2019-01-01 | End: 2019-01-01

## 2019-01-01 RX ORDER — FLUOXETINE HYDROCHLORIDE 20 MG/1
40 CAPSULE ORAL 2 TIMES DAILY
Status: DISCONTINUED | OUTPATIENT
Start: 2019-01-01 | End: 2019-01-01

## 2019-01-01 RX ORDER — DIAZEPAM 10 MG/1
10 TABLET ORAL EVERY EVENING
Qty: 5 TABLET | Refills: 0 | Status: SHIPPED | OUTPATIENT
Start: 2019-01-01 | End: 2019-01-01

## 2019-01-01 RX ORDER — PANTOPRAZOLE SODIUM 40 MG/1
40 TABLET, DELAYED RELEASE ORAL
Qty: 60 TABLET | Refills: 0 | Status: SHIPPED | OUTPATIENT
Start: 2019-01-01 | End: 2019-01-01 | Stop reason: SDUPTHER

## 2019-01-01 RX ORDER — SUCRALFATE ORAL 1 G/10ML
1 SUSPENSION ORAL
Status: DISCONTINUED | OUTPATIENT
Start: 2019-01-01 | End: 2019-01-01 | Stop reason: HOSPADM

## 2019-01-01 RX ORDER — TIZANIDINE 4 MG/1
4 TABLET ORAL 2 TIMES DAILY WITH MEALS
Status: DISCONTINUED | OUTPATIENT
Start: 2019-01-01 | End: 2019-01-01

## 2019-01-01 RX ORDER — SUCRALFATE ORAL 1 G/10ML
1 SUSPENSION ORAL
Qty: 1200 ML | Refills: 0 | Status: ON HOLD | OUTPATIENT
Start: 2019-01-01 | End: 2019-01-01 | Stop reason: ALTCHOICE

## 2019-01-01 RX ORDER — GABAPENTIN 600 MG/1
TABLET ORAL
Qty: 90 TABLET | Refills: 0 | Status: SHIPPED | OUTPATIENT
Start: 2019-01-01 | End: 2019-01-01 | Stop reason: SDUPTHER

## 2019-01-01 RX ORDER — LORAZEPAM 0.5 MG/1
0.5 TABLET ORAL EVERY 4 HOURS PRN
Status: DISCONTINUED | OUTPATIENT
Start: 2019-01-01 | End: 2019-01-01

## 2019-01-01 RX ORDER — BUDESONIDE 0.5 MG/2ML
0.5 INHALANT ORAL 2 TIMES DAILY
Qty: 60 AMPULE | Refills: 0 | Status: ON HOLD | OUTPATIENT
Start: 2019-01-01 | End: 2019-01-01 | Stop reason: ALTCHOICE

## 2019-01-01 RX ORDER — RANITIDINE 150 MG/1
150 CAPSULE ORAL 2 TIMES DAILY
Status: ON HOLD | COMMUNITY
End: 2019-01-01 | Stop reason: ALTCHOICE

## 2019-01-01 RX ORDER — QUETIAPINE FUMARATE 300 MG/1
600 TABLET, FILM COATED ORAL NIGHTLY
Status: DISCONTINUED | OUTPATIENT
Start: 2019-01-01 | End: 2019-01-01 | Stop reason: HOSPADM

## 2019-01-01 RX ORDER — FAMOTIDINE 20 MG/1
20 TABLET, FILM COATED ORAL 2 TIMES DAILY
Status: DISCONTINUED | OUTPATIENT
Start: 2019-01-01 | End: 2019-01-01 | Stop reason: ALTCHOICE

## 2019-01-01 RX ORDER — IPRATROPIUM BROMIDE AND ALBUTEROL SULFATE 2.5; .5 MG/3ML; MG/3ML
3 SOLUTION RESPIRATORY (INHALATION)
Qty: 360 ML | Refills: 0 | Status: ON HOLD | OUTPATIENT
Start: 2019-01-01 | End: 2019-01-01 | Stop reason: ALTCHOICE

## 2019-01-01 RX ORDER — RANITIDINE 150 MG/1
150 TABLET ORAL 2 TIMES DAILY PRN
Status: ON HOLD | COMMUNITY
End: 2019-01-01 | Stop reason: HOSPADM

## 2019-01-01 RX ORDER — PHENYTOIN SODIUM 100 MG/1
100 CAPSULE, EXTENDED RELEASE ORAL DAILY
Status: DISCONTINUED | OUTPATIENT
Start: 2019-01-01 | End: 2019-01-01 | Stop reason: ALTCHOICE

## 2019-01-01 RX ADMIN — PHENYTOIN 100 MG: 50 TABLET, CHEWABLE ORAL at 13:45

## 2019-01-01 RX ADMIN — PHENYTOIN 100 MG: 50 TABLET, CHEWABLE ORAL at 21:50

## 2019-01-01 RX ADMIN — MAGNESIUM SULFATE HEPTAHYDRATE 2 G: 40 INJECTION, SOLUTION INTRAVENOUS at 09:28

## 2019-01-01 RX ADMIN — GABAPENTIN 600 MG: 300 CAPSULE ORAL at 14:53

## 2019-01-01 RX ADMIN — LORAZEPAM 0.5 MG: 0.5 TABLET ORAL at 16:03

## 2019-01-01 RX ADMIN — OXYCODONE HYDROCHLORIDE 5 MG: 5 TABLET ORAL at 21:03

## 2019-01-01 RX ADMIN — LIDOCAINE HYDROCHLORIDE: 10 INJECTION, SOLUTION EPIDURAL; INFILTRATION; INTRACAUDAL; PERINEURAL at 15:45

## 2019-01-01 RX ADMIN — PIPERACILLIN SODIUM,TAZOBACTAM SODIUM 3.38 G: 3; .375 INJECTION, POWDER, FOR SOLUTION INTRAVENOUS at 10:43

## 2019-01-01 RX ADMIN — ONDANSETRON 4 MG: 2 INJECTION INTRAMUSCULAR; INTRAVENOUS at 02:12

## 2019-01-01 RX ADMIN — SODIUM CHLORIDE: 900 INJECTION INTRAVENOUS at 20:27

## 2019-01-01 RX ADMIN — OXYCODONE HYDROCHLORIDE 5 MG: 5 TABLET ORAL at 19:00

## 2019-01-01 RX ADMIN — SODIUM CHLORIDE: 900 INJECTION INTRAVENOUS at 03:42

## 2019-01-01 RX ADMIN — IPRATROPIUM BROMIDE AND ALBUTEROL SULFATE 1 AMPULE: .5; 3 SOLUTION RESPIRATORY (INHALATION) at 17:15

## 2019-01-01 RX ADMIN — BUDESONIDE 500 MCG: 0.5 SUSPENSION RESPIRATORY (INHALATION) at 08:57

## 2019-01-01 RX ADMIN — POLYETHYLENE GLYCOL (3350) 17 G: 17 POWDER, FOR SOLUTION ORAL at 08:18

## 2019-01-01 RX ADMIN — BACLOFEN 10 MG: 10 TABLET ORAL at 04:33

## 2019-01-01 RX ADMIN — Medication 10 ML: at 20:28

## 2019-01-01 RX ADMIN — VANCOMYCIN HYDROCHLORIDE 750 MG: 10 INJECTION, POWDER, LYOPHILIZED, FOR SOLUTION INTRAVENOUS at 20:26

## 2019-01-01 RX ADMIN — TIZANIDINE 4 MG: 4 TABLET ORAL at 08:46

## 2019-01-01 RX ADMIN — PIPERACILLIN SODIUM,TAZOBACTAM SODIUM 3.38 G: 3; .375 INJECTION, POWDER, FOR SOLUTION INTRAVENOUS at 12:30

## 2019-01-01 RX ADMIN — POLYETHYLENE GLYCOL (3350) 17 G: 17 POWDER, FOR SOLUTION ORAL at 08:48

## 2019-01-01 RX ADMIN — SUCRALFATE 1 G: 1 SUSPENSION ORAL at 10:45

## 2019-01-01 RX ADMIN — ENOXAPARIN SODIUM 40 MG: 40 INJECTION SUBCUTANEOUS at 09:05

## 2019-01-01 RX ADMIN — SUCRALFATE 1 G: 1 SUSPENSION ORAL at 16:57

## 2019-01-01 RX ADMIN — FOLIC ACID 1 MG: 1 TABLET ORAL at 11:44

## 2019-01-01 RX ADMIN — QUETIAPINE FUMARATE 600 MG: 300 TABLET ORAL at 21:45

## 2019-01-01 RX ADMIN — Medication 10 ML: at 21:23

## 2019-01-01 RX ADMIN — PIPERACILLIN SODIUM,TAZOBACTAM SODIUM 3.38 G: 3; .375 INJECTION, POWDER, FOR SOLUTION INTRAVENOUS at 20:27

## 2019-01-01 RX ADMIN — SODIUM CHLORIDE: 900 INJECTION INTRAVENOUS at 14:05

## 2019-01-01 RX ADMIN — MIDAZOLAM 2 MG: 1 INJECTION INTRAMUSCULAR; INTRAVENOUS at 15:56

## 2019-01-01 RX ADMIN — OXYCODONE HYDROCHLORIDE 5 MG: 5 TABLET ORAL at 17:19

## 2019-01-01 RX ADMIN — GABAPENTIN 600 MG: 300 CAPSULE ORAL at 08:17

## 2019-01-01 RX ADMIN — SODIUM CHLORIDE 1000 ML: 9 INJECTION, SOLUTION INTRAVENOUS at 20:41

## 2019-01-01 RX ADMIN — BACLOFEN 10 MG: 10 TABLET ORAL at 21:23

## 2019-01-01 RX ADMIN — SODIUM CHLORIDE 500 ML: 9 INJECTION, SOLUTION INTRAVENOUS at 13:42

## 2019-01-01 RX ADMIN — BUDESONIDE 500 MCG: 0.5 SUSPENSION RESPIRATORY (INHALATION) at 08:26

## 2019-01-01 RX ADMIN — ENOXAPARIN SODIUM 30 MG: 30 INJECTION SUBCUTANEOUS at 08:17

## 2019-01-01 RX ADMIN — PANTOPRAZOLE SODIUM 40 MG: 40 INJECTION, POWDER, FOR SOLUTION INTRAVENOUS at 08:17

## 2019-01-01 RX ADMIN — PROMETHAZINE HYDROCHLORIDE 12.5 MG: 25 TABLET ORAL at 15:56

## 2019-01-01 RX ADMIN — Medication 500 ML: at 11:44

## 2019-01-01 RX ADMIN — TIZANIDINE 4 MG: 4 TABLET ORAL at 14:46

## 2019-01-01 RX ADMIN — VANCOMYCIN HYDROCHLORIDE 750 MG: 10 INJECTION, POWDER, LYOPHILIZED, FOR SOLUTION INTRAVENOUS at 01:08

## 2019-01-01 RX ADMIN — PROPOFOL 10 MG: 10 INJECTION, EMULSION INTRAVENOUS at 14:26

## 2019-01-01 RX ADMIN — IOPAMIDOL 80 ML: 755 INJECTION, SOLUTION INTRAVENOUS at 22:23

## 2019-01-01 RX ADMIN — NOREPINEPHRINE BITARTRATE 2 MCG/MIN: 1 INJECTION INTRAVENOUS at 23:31

## 2019-01-01 RX ADMIN — SUCRALFATE 1 G: 1 SUSPENSION ORAL at 04:58

## 2019-01-01 RX ADMIN — FLUOXETINE 40 MG: 20 CAPSULE ORAL at 10:45

## 2019-01-01 RX ADMIN — MIDAZOLAM 2 MG: 1 INJECTION INTRAMUSCULAR; INTRAVENOUS at 15:47

## 2019-01-01 RX ADMIN — BACLOFEN 10 MG: 10 TABLET ORAL at 14:45

## 2019-01-01 RX ADMIN — PHENYTOIN 100 MG: 50 TABLET, CHEWABLE ORAL at 09:38

## 2019-01-01 RX ADMIN — MIDAZOLAM 2 MG: 1 INJECTION INTRAMUSCULAR; INTRAVENOUS at 15:45

## 2019-01-01 RX ADMIN — QUETIAPINE FUMARATE 600 MG: 300 TABLET ORAL at 21:34

## 2019-01-01 RX ADMIN — OXYCODONE HYDROCHLORIDE 5 MG: 5 TABLET ORAL at 08:46

## 2019-01-01 RX ADMIN — OXYCODONE HYDROCHLORIDE 5 MG: 5 TABLET ORAL at 20:42

## 2019-01-01 RX ADMIN — PIPERACILLIN SODIUM,TAZOBACTAM SODIUM 3.38 G: 3; .375 INJECTION, POWDER, FOR SOLUTION INTRAVENOUS at 11:01

## 2019-01-01 RX ADMIN — QUETIAPINE FUMARATE 600 MG: 300 TABLET ORAL at 20:28

## 2019-01-01 RX ADMIN — PROPOFOL 20 MG: 10 INJECTION, EMULSION INTRAVENOUS at 14:32

## 2019-01-01 RX ADMIN — BUDESONIDE 500 MCG: 0.5 SUSPENSION RESPIRATORY (INHALATION) at 21:23

## 2019-01-01 RX ADMIN — OXYCODONE HYDROCHLORIDE 5 MG: 5 TABLET ORAL at 01:06

## 2019-01-01 RX ADMIN — PIPERACILLIN SODIUM,TAZOBACTAM SODIUM 3.38 G: 3; .375 INJECTION, POWDER, FOR SOLUTION INTRAVENOUS at 04:56

## 2019-01-01 RX ADMIN — LIDOCAINE HYDROCHLORIDE 2.5 ML: 40 SOLUTION TOPICAL at 08:39

## 2019-01-01 RX ADMIN — FLUOXETINE 40 MG: 20 CAPSULE ORAL at 21:00

## 2019-01-01 RX ADMIN — GABAPENTIN 600 MG: 300 CAPSULE ORAL at 15:55

## 2019-01-01 RX ADMIN — FENTANYL CITRATE 25 MCG: 50 INJECTION INTRAMUSCULAR; INTRAVENOUS at 13:50

## 2019-01-01 RX ADMIN — SODIUM CHLORIDE: 900 INJECTION INTRAVENOUS at 12:50

## 2019-01-01 RX ADMIN — GABAPENTIN 600 MG: 300 CAPSULE ORAL at 08:48

## 2019-01-01 RX ADMIN — POTASSIUM CHLORIDE 20 MEQ: 29.8 INJECTION, SOLUTION INTRAVENOUS at 08:15

## 2019-01-01 RX ADMIN — SUCRALFATE 1 G: 1 SUSPENSION ORAL at 16:32

## 2019-01-01 RX ADMIN — LIDOCAINE HYDROCHLORIDE 60 MG: 20 INJECTION, SOLUTION INTRAVENOUS at 14:22

## 2019-01-01 RX ADMIN — PANTOPRAZOLE SODIUM 40 MG: 40 INJECTION, POWDER, FOR SOLUTION INTRAVENOUS at 10:43

## 2019-01-01 RX ADMIN — VANCOMYCIN HYDROCHLORIDE 750 MG: 10 INJECTION, POWDER, LYOPHILIZED, FOR SOLUTION INTRAVENOUS at 22:33

## 2019-01-01 RX ADMIN — VANCOMYCIN HYDROCHLORIDE 750 MG: 10 INJECTION, POWDER, LYOPHILIZED, FOR SOLUTION INTRAVENOUS at 16:54

## 2019-01-01 RX ADMIN — FLUOXETINE 40 MG: 20 CAPSULE ORAL at 20:41

## 2019-01-01 RX ADMIN — QUETIAPINE FUMARATE 600 MG: 300 TABLET ORAL at 21:43

## 2019-01-01 RX ADMIN — PROMETHAZINE HYDROCHLORIDE 12.5 MG: 25 TABLET ORAL at 21:32

## 2019-01-01 RX ADMIN — PHENYTOIN 100 MG: 50 TABLET, CHEWABLE ORAL at 16:04

## 2019-01-01 RX ADMIN — GABAPENTIN 600 MG: 300 CAPSULE ORAL at 16:55

## 2019-01-01 RX ADMIN — GABAPENTIN 600 MG: 300 CAPSULE ORAL at 21:37

## 2019-01-01 RX ADMIN — MIRTAZAPINE 15 MG: 15 TABLET, FILM COATED ORAL at 21:22

## 2019-01-01 RX ADMIN — GABAPENTIN 600 MG: 300 CAPSULE ORAL at 09:30

## 2019-01-01 RX ADMIN — ENOXAPARIN SODIUM 30 MG: 30 INJECTION SUBCUTANEOUS at 10:42

## 2019-01-01 RX ADMIN — PANTOPRAZOLE SODIUM 40 MG: 40 INJECTION, POWDER, FOR SOLUTION INTRAVENOUS at 21:38

## 2019-01-01 RX ADMIN — PHENYTOIN 100 MG: 50 TABLET, CHEWABLE ORAL at 13:59

## 2019-01-01 RX ADMIN — ENOXAPARIN SODIUM 40 MG: 40 INJECTION SUBCUTANEOUS at 09:31

## 2019-01-01 RX ADMIN — SUCRALFATE 1 G: 1 SUSPENSION ORAL at 05:56

## 2019-01-01 RX ADMIN — PROMETHAZINE HYDROCHLORIDE 12.5 MG: 25 TABLET ORAL at 18:01

## 2019-01-01 RX ADMIN — BUDESONIDE 500 MCG: 0.5 SUSPENSION RESPIRATORY (INHALATION) at 08:53

## 2019-01-01 RX ADMIN — BACLOFEN 10 MG: 10 TABLET ORAL at 20:42

## 2019-01-01 RX ADMIN — DIAZEPAM 10 MG: 10 TABLET ORAL at 18:36

## 2019-01-01 RX ADMIN — BACLOFEN 10 MG: 10 TABLET ORAL at 08:46

## 2019-01-01 RX ADMIN — PHENYTOIN 100 MG: 50 TABLET, CHEWABLE ORAL at 09:04

## 2019-01-01 RX ADMIN — TIZANIDINE 4 MG: 4 TABLET ORAL at 13:59

## 2019-01-01 RX ADMIN — PROPOFOL 140 MCG/KG/MIN: 10 INJECTION, EMULSION INTRAVENOUS at 11:50

## 2019-01-01 RX ADMIN — OXYCODONE HYDROCHLORIDE 5 MG: 5 TABLET ORAL at 15:36

## 2019-01-01 RX ADMIN — FAMOTIDINE 20 MG: 20 TABLET ORAL at 20:42

## 2019-01-01 RX ADMIN — OXYCODONE HYDROCHLORIDE 5 MG: 5 TABLET ORAL at 09:04

## 2019-01-01 RX ADMIN — BACLOFEN 10 MG: 10 TABLET ORAL at 20:27

## 2019-01-01 RX ADMIN — IPRATROPIUM BROMIDE AND ALBUTEROL SULFATE 1 AMPULE: .5; 3 SOLUTION RESPIRATORY (INHALATION) at 08:56

## 2019-01-01 RX ADMIN — OXYCODONE HYDROCHLORIDE 5 MG: 5 TABLET ORAL at 00:54

## 2019-01-01 RX ADMIN — LIDOCAINE HYDROCHLORIDE 40 MG: 10 INJECTION, SOLUTION INFILTRATION; PERINEURAL at 11:50

## 2019-01-01 RX ADMIN — Medication 10 ML: at 09:05

## 2019-01-01 RX ADMIN — PHENYTOIN 100 MG: 50 TABLET, CHEWABLE ORAL at 08:46

## 2019-01-01 RX ADMIN — QUETIAPINE FUMARATE 600 MG: 300 TABLET ORAL at 21:49

## 2019-01-01 RX ADMIN — OXYCODONE HYDROCHLORIDE 5 MG: 5 TABLET ORAL at 14:27

## 2019-01-01 RX ADMIN — FOLIC ACID 1 MG: 1 TABLET ORAL at 12:20

## 2019-01-01 RX ADMIN — PIPERACILLIN SODIUM,TAZOBACTAM SODIUM 3.38 G: 3; .375 INJECTION, POWDER, FOR SOLUTION INTRAVENOUS at 04:30

## 2019-01-01 RX ADMIN — SUCRALFATE 1 G: 1 SUSPENSION ORAL at 12:20

## 2019-01-01 RX ADMIN — OXYCODONE HYDROCHLORIDE 5 MG: 5 TABLET ORAL at 22:27

## 2019-01-01 RX ADMIN — Medication 10 ML: at 08:19

## 2019-01-01 RX ADMIN — PROPOFOL 20 MG: 10 INJECTION, EMULSION INTRAVENOUS at 14:34

## 2019-01-01 RX ADMIN — POLYETHYLENE GLYCOL (3350) 17 G: 17 POWDER, FOR SOLUTION ORAL at 10:43

## 2019-01-01 RX ADMIN — BACLOFEN 10 MG: 10 TABLET ORAL at 21:00

## 2019-01-01 RX ADMIN — SODIUM CHLORIDE 500 ML: 9 INJECTION, SOLUTION INTRAVENOUS at 11:44

## 2019-01-01 RX ADMIN — FLUOXETINE 40 MG: 20 CAPSULE ORAL at 20:27

## 2019-01-01 RX ADMIN — PIPERACILLIN SODIUM,TAZOBACTAM SODIUM 3.38 G: 3; .375 INJECTION, POWDER, FOR SOLUTION INTRAVENOUS at 11:51

## 2019-01-01 RX ADMIN — GABAPENTIN 600 MG: 300 CAPSULE ORAL at 20:27

## 2019-01-01 RX ADMIN — IPRATROPIUM BROMIDE AND ALBUTEROL SULFATE 1 AMPULE: .5; 3 SOLUTION RESPIRATORY (INHALATION) at 11:12

## 2019-01-01 RX ADMIN — Medication 10 ML: at 20:43

## 2019-01-01 RX ADMIN — FLUOXETINE 40 MG: 20 CAPSULE ORAL at 21:34

## 2019-01-01 RX ADMIN — DIAZEPAM 10 MG: 10 TABLET ORAL at 18:16

## 2019-01-01 RX ADMIN — PHENYTOIN 100 MG: 50 TABLET, CHEWABLE ORAL at 16:55

## 2019-01-01 RX ADMIN — SODIUM CHLORIDE: 900 INJECTION INTRAVENOUS at 16:28

## 2019-01-01 RX ADMIN — OXYCODONE HYDROCHLORIDE 5 MG: 5 TABLET ORAL at 04:47

## 2019-01-01 RX ADMIN — BACLOFEN 10 MG: 10 TABLET ORAL at 22:33

## 2019-01-01 RX ADMIN — IPRATROPIUM BROMIDE AND ALBUTEROL SULFATE 1 AMPULE: .5; 3 SOLUTION RESPIRATORY (INHALATION) at 21:25

## 2019-01-01 RX ADMIN — GABAPENTIN 600 MG: 300 CAPSULE ORAL at 09:04

## 2019-01-01 RX ADMIN — PHENYTOIN 100 MG: 50 TABLET, CHEWABLE ORAL at 21:35

## 2019-01-01 RX ADMIN — PHENYTOIN 100 MG: 50 TABLET, CHEWABLE ORAL at 21:22

## 2019-01-01 RX ADMIN — Medication 10 ML: at 21:49

## 2019-01-01 RX ADMIN — SODIUM CHLORIDE: 9 INJECTION, SOLUTION INTRAVENOUS at 11:43

## 2019-01-01 RX ADMIN — POLYETHYLENE GLYCOL (3350) 17 G: 17 POWDER, FOR SOLUTION ORAL at 09:31

## 2019-01-01 RX ADMIN — FAMOTIDINE 20 MG: 20 TABLET ORAL at 20:21

## 2019-01-01 RX ADMIN — POTASSIUM CHLORIDE 20 MEQ: 20 TABLET, EXTENDED RELEASE ORAL at 10:43

## 2019-01-01 RX ADMIN — ONDANSETRON 4 MG: 2 INJECTION INTRAMUSCULAR; INTRAVENOUS at 09:32

## 2019-01-01 RX ADMIN — SODIUM CHLORIDE 500 ML: 9 INJECTION, SOLUTION INTRAVENOUS at 22:22

## 2019-01-01 RX ADMIN — PHENYTOIN 100 MG: 50 TABLET, CHEWABLE ORAL at 08:49

## 2019-01-01 RX ADMIN — Medication 10 ML: at 21:36

## 2019-01-01 RX ADMIN — POTASSIUM CHLORIDE 40 MEQ: 1.5 POWDER, FOR SOLUTION ORAL at 07:00

## 2019-01-01 RX ADMIN — Medication 500 ML: at 15:45

## 2019-01-01 RX ADMIN — LORAZEPAM 0.5 MG: 0.5 TABLET ORAL at 08:48

## 2019-01-01 RX ADMIN — PROPOFOL 50 MG: 10 INJECTION, EMULSION INTRAVENOUS at 14:22

## 2019-01-01 RX ADMIN — IPRATROPIUM BROMIDE AND ALBUTEROL SULFATE 1 AMPULE: .5; 3 SOLUTION RESPIRATORY (INHALATION) at 08:26

## 2019-01-01 RX ADMIN — SODIUM CHLORIDE: 9 INJECTION, SOLUTION INTRAVENOUS at 22:52

## 2019-01-01 RX ADMIN — OXYCODONE HYDROCHLORIDE 5 MG: 5 TABLET ORAL at 02:16

## 2019-01-01 RX ADMIN — FOLIC ACID 1 MG: 1 TABLET ORAL at 10:44

## 2019-01-01 RX ADMIN — TIZANIDINE 4 MG: 4 TABLET ORAL at 08:17

## 2019-01-01 RX ADMIN — FENTANYL CITRATE 25 MCG: 50 INJECTION INTRAMUSCULAR; INTRAVENOUS at 15:45

## 2019-01-01 RX ADMIN — QUETIAPINE FUMARATE 600 MG: 300 TABLET ORAL at 21:37

## 2019-01-01 RX ADMIN — GABAPENTIN 600 MG: 300 CAPSULE ORAL at 08:46

## 2019-01-01 RX ADMIN — BACLOFEN 10 MG: 10 TABLET ORAL at 09:30

## 2019-01-01 RX ADMIN — PROPOFOL 70 MG: 10 INJECTION, EMULSION INTRAVENOUS at 10:06

## 2019-01-01 RX ADMIN — LORAZEPAM 0.5 MG: 0.5 TABLET ORAL at 02:16

## 2019-01-01 RX ADMIN — PIPERACILLIN SODIUM,TAZOBACTAM SODIUM 3.38 G: 3; .375 INJECTION, POWDER, FOR SOLUTION INTRAVENOUS at 05:04

## 2019-01-01 RX ADMIN — GABAPENTIN 600 MG: 300 CAPSULE ORAL at 21:35

## 2019-01-01 RX ADMIN — GABAPENTIN 600 MG: 300 CAPSULE ORAL at 20:42

## 2019-01-01 RX ADMIN — IPRATROPIUM BROMIDE AND ALBUTEROL SULFATE 1 AMPULE: .5; 3 SOLUTION RESPIRATORY (INHALATION) at 16:38

## 2019-01-01 RX ADMIN — GABAPENTIN 600 MG: 300 CAPSULE ORAL at 14:28

## 2019-01-01 RX ADMIN — FLUOXETINE 40 MG: 20 CAPSULE ORAL at 08:17

## 2019-01-01 RX ADMIN — Medication 10 ML: at 20:21

## 2019-01-01 RX ADMIN — ONDANSETRON 4 MG: 2 INJECTION INTRAMUSCULAR; INTRAVENOUS at 16:39

## 2019-01-01 RX ADMIN — FOLIC ACID 1 MG: 1 TABLET ORAL at 11:57

## 2019-01-01 RX ADMIN — HEPARIN SODIUM (PORCINE) LOCK FLUSH IV SOLN 100 UNIT/ML 100 UNITS: 100 SOLUTION at 13:08

## 2019-01-01 RX ADMIN — IPRATROPIUM BROMIDE AND ALBUTEROL SULFATE 1 AMPULE: .5; 3 SOLUTION RESPIRATORY (INHALATION) at 08:52

## 2019-01-01 RX ADMIN — IPRATROPIUM BROMIDE AND ALBUTEROL SULFATE 1 AMPULE: .5; 3 SOLUTION RESPIRATORY (INHALATION) at 13:21

## 2019-01-01 RX ADMIN — GABAPENTIN 600 MG: 300 CAPSULE ORAL at 20:21

## 2019-01-01 RX ADMIN — FAMOTIDINE 20 MG: 20 TABLET ORAL at 09:04

## 2019-01-01 RX ADMIN — Medication 500 ML: at 13:42

## 2019-01-01 RX ADMIN — Medication 10 ML: at 08:50

## 2019-01-01 RX ADMIN — IPRATROPIUM BROMIDE AND ALBUTEROL SULFATE 1 AMPULE: .5; 3 SOLUTION RESPIRATORY (INHALATION) at 17:16

## 2019-01-01 RX ADMIN — OXYCODONE HYDROCHLORIDE 5 MG: 5 TABLET ORAL at 04:44

## 2019-01-01 RX ADMIN — PIPERACILLIN SODIUM,TAZOBACTAM SODIUM 3.38 G: 3; .375 INJECTION, POWDER, FOR SOLUTION INTRAVENOUS at 21:33

## 2019-01-01 RX ADMIN — BACLOFEN 10 MG: 10 TABLET ORAL at 21:38

## 2019-01-01 RX ADMIN — SUCRALFATE 1 G: 1 SUSPENSION ORAL at 10:17

## 2019-01-01 RX ADMIN — FOLIC ACID 1 MG: 1 TABLET ORAL at 10:17

## 2019-01-01 RX ADMIN — OXYCODONE HYDROCHLORIDE 5 MG: 5 TABLET ORAL at 09:38

## 2019-01-01 RX ADMIN — IPRATROPIUM BROMIDE AND ALBUTEROL SULFATE 1 AMPULE: .5; 3 SOLUTION RESPIRATORY (INHALATION) at 12:13

## 2019-01-01 RX ADMIN — BACLOFEN 10 MG: 10 TABLET ORAL at 09:04

## 2019-01-01 RX ADMIN — QUETIAPINE FUMARATE 600 MG: 300 TABLET ORAL at 21:23

## 2019-01-01 RX ADMIN — VANCOMYCIN HYDROCHLORIDE 750 MG: 10 INJECTION, POWDER, LYOPHILIZED, FOR SOLUTION INTRAVENOUS at 05:56

## 2019-01-01 RX ADMIN — FAMOTIDINE 20 MG: 20 TABLET ORAL at 21:00

## 2019-01-01 RX ADMIN — SODIUM CHLORIDE: 9 INJECTION, SOLUTION INTRAVENOUS at 11:02

## 2019-01-01 RX ADMIN — PHENYTOIN 100 MG: 50 TABLET, CHEWABLE ORAL at 20:22

## 2019-01-01 RX ADMIN — PIPERACILLIN SODIUM,TAZOBACTAM SODIUM 3.38 G: 3; .375 INJECTION, POWDER, FOR SOLUTION INTRAVENOUS at 20:41

## 2019-01-01 RX ADMIN — FAMOTIDINE 20 MG: 20 TABLET ORAL at 09:30

## 2019-01-01 RX ADMIN — FLUOXETINE 40 MG: 20 CAPSULE ORAL at 08:48

## 2019-01-01 RX ADMIN — BUDESONIDE 500 MCG: 0.5 SUSPENSION RESPIRATORY (INHALATION) at 07:57

## 2019-01-01 RX ADMIN — PANTOPRAZOLE SODIUM 40 MG: 40 INJECTION, POWDER, FOR SOLUTION INTRAVENOUS at 08:48

## 2019-01-01 RX ADMIN — SODIUM CHLORIDE: 900 INJECTION INTRAVENOUS at 12:28

## 2019-01-01 RX ADMIN — PANTOPRAZOLE SODIUM 40 MG: 40 INJECTION, POWDER, FOR SOLUTION INTRAVENOUS at 20:27

## 2019-01-01 RX ADMIN — OXYCODONE HYDROCHLORIDE 5 MG: 5 TABLET ORAL at 13:59

## 2019-01-01 RX ADMIN — OXYCODONE HYDROCHLORIDE 5 MG: 5 TABLET ORAL at 02:09

## 2019-01-01 RX ADMIN — POTASSIUM PHOSPHATE, MONOBASIC 250 MG: 500 TABLET, SOLUBLE ORAL at 08:18

## 2019-01-01 RX ADMIN — ALTEPLASE 1 MG: 2.2 INJECTION, POWDER, LYOPHILIZED, FOR SOLUTION INTRAVENOUS at 06:41

## 2019-01-01 RX ADMIN — Medication 10 ML: at 21:37

## 2019-01-01 RX ADMIN — TIZANIDINE 4 MG: 4 TABLET ORAL at 08:48

## 2019-01-01 RX ADMIN — LORAZEPAM 0.5 MG: 2 INJECTION INTRAMUSCULAR; INTRAVENOUS at 10:13

## 2019-01-01 RX ADMIN — SODIUM CHLORIDE: 900 INJECTION INTRAVENOUS at 09:06

## 2019-01-01 RX ADMIN — VANCOMYCIN HYDROCHLORIDE 1000 MG: 10 INJECTION, POWDER, LYOPHILIZED, FOR SOLUTION INTRAVENOUS at 22:48

## 2019-01-01 RX ADMIN — IPRATROPIUM BROMIDE AND ALBUTEROL SULFATE 1 AMPULE: .5; 3 SOLUTION RESPIRATORY (INHALATION) at 17:00

## 2019-01-01 RX ADMIN — ONDANSETRON 4 MG: 2 INJECTION INTRAMUSCULAR; INTRAVENOUS at 20:21

## 2019-01-01 RX ADMIN — TIZANIDINE 4 MG: 4 TABLET ORAL at 12:20

## 2019-01-01 RX ADMIN — GABAPENTIN 600 MG: 300 CAPSULE ORAL at 10:44

## 2019-01-01 RX ADMIN — PHENYTOIN 100 MG: 50 TABLET, CHEWABLE ORAL at 20:28

## 2019-01-01 RX ADMIN — GABAPENTIN 600 MG: 300 CAPSULE ORAL at 21:00

## 2019-01-01 RX ADMIN — VANCOMYCIN HYDROCHLORIDE 750 MG: 10 INJECTION, POWDER, LYOPHILIZED, FOR SOLUTION INTRAVENOUS at 11:05

## 2019-01-01 RX ADMIN — PROPOFOL 20 MG: 10 INJECTION, EMULSION INTRAVENOUS at 14:30

## 2019-01-01 RX ADMIN — BACLOFEN 10 MG: 10 TABLET ORAL at 13:59

## 2019-01-01 RX ADMIN — GABAPENTIN 600 MG: 300 CAPSULE ORAL at 21:22

## 2019-01-01 RX ADMIN — PROPOFOL 20 MG: 10 INJECTION, EMULSION INTRAVENOUS at 14:29

## 2019-01-01 RX ADMIN — BACLOFEN 10 MG: 10 TABLET ORAL at 21:34

## 2019-01-01 RX ADMIN — POTASSIUM PHOSPHATE, MONOBASIC 250 MG: 500 TABLET, SOLUBLE ORAL at 15:36

## 2019-01-01 RX ADMIN — OXYCODONE HYDROCHLORIDE 5 MG: 5 TABLET ORAL at 19:12

## 2019-01-01 RX ADMIN — FLUOXETINE 40 MG: 20 CAPSULE ORAL at 08:46

## 2019-01-01 RX ADMIN — SODIUM CHLORIDE 500 ML: 9 INJECTION, SOLUTION INTRAVENOUS at 16:51

## 2019-01-01 RX ADMIN — OXYCODONE HYDROCHLORIDE 5 MG: 5 TABLET ORAL at 14:46

## 2019-01-01 RX ADMIN — PHENYTOIN 100 MG: 50 TABLET, CHEWABLE ORAL at 15:09

## 2019-01-01 RX ADMIN — VANCOMYCIN HYDROCHLORIDE 750 MG: 10 INJECTION, POWDER, LYOPHILIZED, FOR SOLUTION INTRAVENOUS at 00:12

## 2019-01-01 RX ADMIN — PHENYTOIN 100 MG: 50 TABLET, CHEWABLE ORAL at 14:27

## 2019-01-01 RX ADMIN — SODIUM CHLORIDE, SODIUM LACTATE, POTASSIUM CHLORIDE, AND CALCIUM CHLORIDE: 600; 310; 30; 20 INJECTION, SOLUTION INTRAVENOUS at 11:18

## 2019-01-01 RX ADMIN — IPRATROPIUM BROMIDE AND ALBUTEROL SULFATE 1 AMPULE: .5; 3 SOLUTION RESPIRATORY (INHALATION) at 07:57

## 2019-01-01 RX ADMIN — PHENYTOIN 100 MG: 50 TABLET, CHEWABLE ORAL at 22:25

## 2019-01-01 RX ADMIN — SUCRALFATE 1 G: 1 SUSPENSION ORAL at 06:40

## 2019-01-01 RX ADMIN — FAMOTIDINE 20 MG: 20 TABLET ORAL at 08:46

## 2019-01-01 RX ADMIN — OXYCODONE HYDROCHLORIDE 5 MG: 5 TABLET ORAL at 04:30

## 2019-01-01 RX ADMIN — TIZANIDINE 4 MG: 4 TABLET ORAL at 09:30

## 2019-01-01 RX ADMIN — PIPERACILLIN SODIUM,TAZOBACTAM SODIUM 3.38 G: 3; .375 INJECTION, POWDER, FOR SOLUTION INTRAVENOUS at 20:42

## 2019-01-01 RX ADMIN — SUCRALFATE 1 G: 1 SUSPENSION ORAL at 16:03

## 2019-01-01 RX ADMIN — PROPOFOL 20 MG: 10 INJECTION, EMULSION INTRAVENOUS at 14:24

## 2019-01-01 RX ADMIN — TIZANIDINE 4 MG: 4 TABLET ORAL at 10:45

## 2019-01-01 RX ADMIN — ENOXAPARIN SODIUM 30 MG: 30 INJECTION SUBCUTANEOUS at 08:46

## 2019-01-01 RX ADMIN — PANTOPRAZOLE SODIUM 40 MG: 40 INJECTION, POWDER, FOR SOLUTION INTRAVENOUS at 21:23

## 2019-01-01 RX ADMIN — FLUOXETINE 40 MG: 20 CAPSULE ORAL at 21:22

## 2019-01-01 RX ADMIN — ENOXAPARIN SODIUM 40 MG: 40 INJECTION SUBCUTANEOUS at 08:46

## 2019-01-01 RX ADMIN — ALBUTEROL SULFATE 2.5 MG: 2.5 SOLUTION RESPIRATORY (INHALATION) at 03:19

## 2019-01-01 RX ADMIN — VANCOMYCIN HYDROCHLORIDE 750 MG: 10 INJECTION, POWDER, LYOPHILIZED, FOR SOLUTION INTRAVENOUS at 01:16

## 2019-01-01 RX ADMIN — TIZANIDINE 4 MG: 4 TABLET ORAL at 14:28

## 2019-01-01 RX ADMIN — BUDESONIDE 500 MCG: 0.5 SUSPENSION RESPIRATORY (INHALATION) at 11:12

## 2019-01-01 RX ADMIN — PIPERACILLIN SODIUM,TAZOBACTAM SODIUM 3.38 G: 3; .375 INJECTION, POWDER, FOR SOLUTION INTRAVENOUS at 20:30

## 2019-01-01 RX ADMIN — FOLIC ACID 1 MG: 1 TABLET ORAL at 14:46

## 2019-01-01 RX ADMIN — MAGNESIUM SULFATE HEPTAHYDRATE 2 G: 40 INJECTION, SOLUTION INTRAVENOUS at 04:33

## 2019-01-01 RX ADMIN — POTASSIUM CHLORIDE 20 MEQ: 29.8 INJECTION, SOLUTION INTRAVENOUS at 07:00

## 2019-01-01 RX ADMIN — SODIUM CHLORIDE 500 ML: 9 INJECTION, SOLUTION INTRAVENOUS at 15:45

## 2019-01-01 RX ADMIN — FAMOTIDINE 20 MG: 20 TABLET ORAL at 14:46

## 2019-01-01 RX ADMIN — MAGNESIUM HYDROXIDE 15 ML: 400 SUSPENSION ORAL at 16:41

## 2019-01-01 RX ADMIN — Medication 10 ML: at 10:05

## 2019-01-01 RX ADMIN — PHENYTOIN 100 MG: 50 TABLET, CHEWABLE ORAL at 10:14

## 2019-01-01 RX ADMIN — PROPOFOL 20 MG: 10 INJECTION, EMULSION INTRAVENOUS at 14:27

## 2019-01-01 RX ADMIN — PIPERACILLIN SODIUM,TAZOBACTAM SODIUM 3.38 G: 3; .375 INJECTION, POWDER, FOR SOLUTION INTRAVENOUS at 04:24

## 2019-01-01 RX ADMIN — POTASSIUM PHOSPHATE, MONOBASIC 250 MG: 500 TABLET, SOLUBLE ORAL at 02:50

## 2019-01-01 RX ADMIN — GABAPENTIN 600 MG: 300 CAPSULE ORAL at 13:59

## 2019-01-01 RX ADMIN — FAMOTIDINE 20 MG: 20 TABLET ORAL at 21:35

## 2019-01-01 RX ADMIN — DEXTROSE MONOHYDRATE 1 G: 5 INJECTION INTRAVENOUS at 18:57

## 2019-01-01 RX ADMIN — MIDAZOLAM 1 MG: 1 INJECTION INTRAMUSCULAR; INTRAVENOUS at 13:52

## 2019-01-01 RX ADMIN — PROPOFOL 50 MG: 10 INJECTION, EMULSION INTRAVENOUS at 10:13

## 2019-01-01 RX ADMIN — FLUOXETINE 40 MG: 20 CAPSULE ORAL at 20:21

## 2019-01-01 RX ADMIN — PIPERACILLIN SODIUM,TAZOBACTAM SODIUM 3.38 G: 3; .375 INJECTION, POWDER, FOR SOLUTION INTRAVENOUS at 11:57

## 2019-01-01 RX ADMIN — Medication 10 ML: at 10:45

## 2019-01-01 RX ADMIN — TIZANIDINE 4 MG: 4 TABLET ORAL at 16:55

## 2019-01-01 RX ADMIN — PHENYTOIN 100 MG: 50 TABLET, CHEWABLE ORAL at 14:47

## 2019-01-01 RX ADMIN — OXYCODONE HYDROCHLORIDE 5 MG: 5 TABLET ORAL at 16:26

## 2019-01-01 RX ADMIN — ENOXAPARIN SODIUM 40 MG: 40 INJECTION SUBCUTANEOUS at 14:47

## 2019-01-01 RX ADMIN — TIZANIDINE 4 MG: 4 TABLET ORAL at 09:00

## 2019-01-01 RX ADMIN — PIPERACILLIN SODIUM,TAZOBACTAM SODIUM 3.38 G: 3; .375 INJECTION, POWDER, FOR SOLUTION INTRAVENOUS at 03:42

## 2019-01-01 RX ADMIN — FLUOXETINE 40 MG: 20 CAPSULE ORAL at 21:37

## 2019-01-01 RX ADMIN — PROMETHAZINE HYDROCHLORIDE 12.5 MG: 25 TABLET ORAL at 16:55

## 2019-01-01 RX ADMIN — FLUOXETINE 40 MG: 20 CAPSULE ORAL at 14:46

## 2019-01-01 RX ADMIN — PHENYTOIN 100 MG: 50 TABLET, CHEWABLE ORAL at 10:44

## 2019-01-01 RX ADMIN — PROMETHAZINE HYDROCHLORIDE 12.5 MG: 25 TABLET ORAL at 04:43

## 2019-01-01 RX ADMIN — FLUOXETINE 40 MG: 20 CAPSULE ORAL at 09:04

## 2019-01-01 RX ADMIN — FLUOXETINE 40 MG: 20 CAPSULE ORAL at 09:30

## 2019-01-01 RX ADMIN — OXYCODONE HYDROCHLORIDE 5 MG: 5 TABLET ORAL at 21:35

## 2019-01-01 RX ADMIN — PHENYTOIN 100 MG: 50 TABLET, CHEWABLE ORAL at 20:42

## 2019-01-01 RX ADMIN — POTASSIUM CHLORIDE 40 MEQ: 1.5 POWDER, FOR SOLUTION ORAL at 04:33

## 2019-01-01 RX ADMIN — IPRATROPIUM BROMIDE AND ALBUTEROL SULFATE 1 AMPULE: .5; 3 SOLUTION RESPIRATORY (INHALATION) at 12:21

## 2019-01-01 RX ADMIN — SODIUM CHLORIDE 500 ML: 9 INJECTION, SOLUTION INTRAVENOUS at 12:38

## 2019-01-01 RX ADMIN — PROPOFOL 20 MG: 10 INJECTION, EMULSION INTRAVENOUS at 11:50

## 2019-01-01 RX ADMIN — PROMETHAZINE HYDROCHLORIDE 12.5 MG: 25 TABLET ORAL at 21:23

## 2019-01-01 ASSESSMENT — PAIN DESCRIPTION - PAIN TYPE
TYPE: CHRONIC PAIN
TYPE: ACUTE PAIN
TYPE: ACUTE PAIN;CHRONIC PAIN;SURGICAL PAIN
TYPE: CHRONIC PAIN
TYPE: ACUTE PAIN
TYPE: ACUTE PAIN
TYPE: CHRONIC PAIN
TYPE: ACUTE PAIN
TYPE: CHRONIC PAIN
TYPE: ACUTE PAIN
TYPE: CHRONIC PAIN

## 2019-01-01 ASSESSMENT — PAIN DESCRIPTION - PROGRESSION
CLINICAL_PROGRESSION: NOT CHANGED
CLINICAL_PROGRESSION: GRADUALLY WORSENING
CLINICAL_PROGRESSION: GRADUALLY IMPROVING
CLINICAL_PROGRESSION: GRADUALLY IMPROVING
CLINICAL_PROGRESSION: GRADUALLY WORSENING
CLINICAL_PROGRESSION: GRADUALLY IMPROVING
CLINICAL_PROGRESSION: GRADUALLY WORSENING
CLINICAL_PROGRESSION: NOT CHANGED
CLINICAL_PROGRESSION: GRADUALLY WORSENING
CLINICAL_PROGRESSION: NOT CHANGED
CLINICAL_PROGRESSION: NOT CHANGED
CLINICAL_PROGRESSION: GRADUALLY IMPROVING
CLINICAL_PROGRESSION: GRADUALLY WORSENING
CLINICAL_PROGRESSION: GRADUALLY WORSENING

## 2019-01-01 ASSESSMENT — ENCOUNTER SYMPTOMS
EYE PAIN: 0
FACIAL SWELLING: 0
ABDOMINAL DISTENTION: 0
SHORTNESS OF BREATH: 1
ROS SKIN COMMENTS: MULTIPLE DECUBITI
VOICE CHANGE: 0
BACK PAIN: 1
BACK PAIN: 1
COUGH: 0
COLOR CHANGE: 0
BACK PAIN: 1
EYE DISCHARGE: 0
NAUSEA: 1
ABDOMINAL PAIN: 0
SHORTNESS OF BREATH: 1
ABDOMINAL DISTENTION: 0
RECTAL PAIN: 0
PHOTOPHOBIA: 0
CHOKING: 0
WHEEZING: 0
SHORTNESS OF BREATH: 1
STRIDOR: 0
COUGH: 0
EYE REDNESS: 0
APNEA: 0
TROUBLE SWALLOWING: 0
COUGH: 0
CHEST TIGHTNESS: 0

## 2019-01-01 ASSESSMENT — PULMONARY FUNCTION TESTS
PIF_VALUE: 0
PIF_VALUE: 1
PIF_VALUE: 0
PIF_VALUE: 1
PIF_VALUE: 0

## 2019-01-01 ASSESSMENT — PAIN DESCRIPTION - ONSET
ONSET: ON-GOING
ONSET: AWAKENED FROM SLEEP
ONSET: ON-GOING
ONSET: AWAKENED FROM SLEEP
ONSET: ON-GOING
ONSET: ON-GOING

## 2019-01-01 ASSESSMENT — PAIN - FUNCTIONAL ASSESSMENT
PAIN_FUNCTIONAL_ASSESSMENT: 0-10
PAIN_FUNCTIONAL_ASSESSMENT: ACTIVITIES ARE NOT PREVENTED
PAIN_FUNCTIONAL_ASSESSMENT: PREVENTS OR INTERFERES WITH ALL ACTIVE AND SOME PASSIVE ACTIVITIES
PAIN_FUNCTIONAL_ASSESSMENT: PREVENTS OR INTERFERES WITH ALL ACTIVE AND SOME PASSIVE ACTIVITIES
PAIN_FUNCTIONAL_ASSESSMENT: 0-10
PAIN_FUNCTIONAL_ASSESSMENT: PREVENTS OR INTERFERES WITH ALL ACTIVE AND SOME PASSIVE ACTIVITIES
PAIN_FUNCTIONAL_ASSESSMENT: ACTIVITIES ARE NOT PREVENTED
PAIN_FUNCTIONAL_ASSESSMENT: PREVENTS OR INTERFERES WITH ALL ACTIVE AND SOME PASSIVE ACTIVITIES
PAIN_FUNCTIONAL_ASSESSMENT: PREVENTS OR INTERFERES WITH MANY ACTIVE NOT PASSIVE ACTIVITIES
PAIN_FUNCTIONAL_ASSESSMENT: PREVENTS OR INTERFERES WITH ALL ACTIVE AND SOME PASSIVE ACTIVITIES

## 2019-01-01 ASSESSMENT — PAIN SCALES - GENERAL
PAINLEVEL_OUTOF10: 0
PAINLEVEL_OUTOF10: 7
PAINLEVEL_OUTOF10: 7
PAINLEVEL_OUTOF10: 0
PAINLEVEL_OUTOF10: 5
PAINLEVEL_OUTOF10: 8
PAINLEVEL_OUTOF10: 7
PAINLEVEL_OUTOF10: 0
PAINLEVEL_OUTOF10: 5
PAINLEVEL_OUTOF10: 7
PAINLEVEL_OUTOF10: 8
PAINLEVEL_OUTOF10: 0
PAINLEVEL_OUTOF10: 7
PAINLEVEL_OUTOF10: 0
PAINLEVEL_OUTOF10: 7
PAINLEVEL_OUTOF10: 0
PAINLEVEL_OUTOF10: 4
PAINLEVEL_OUTOF10: 7
PAINLEVEL_OUTOF10: 0
PAINLEVEL_OUTOF10: 0
PAINLEVEL_OUTOF10: 8
PAINLEVEL_OUTOF10: 0
PAINLEVEL_OUTOF10: 7
PAINLEVEL_OUTOF10: 0
PAINLEVEL_OUTOF10: 5
PAINLEVEL_OUTOF10: 0
PAINLEVEL_OUTOF10: 3
PAINLEVEL_OUTOF10: 8
PAINLEVEL_OUTOF10: 6
PAINLEVEL_OUTOF10: 5
PAINLEVEL_OUTOF10: 0
PAINLEVEL_OUTOF10: 0
PAINLEVEL_OUTOF10: 8
PAINLEVEL_OUTOF10: 10
PAINLEVEL_OUTOF10: 7
PAINLEVEL_OUTOF10: 5
PAINLEVEL_OUTOF10: 8
PAINLEVEL_OUTOF10: 2
PAINLEVEL_OUTOF10: 7
PAINLEVEL_OUTOF10: 7
PAINLEVEL_OUTOF10: 0
PAINLEVEL_OUTOF10: 0
PAINLEVEL_OUTOF10: 10
PAINLEVEL_OUTOF10: 7
PAINLEVEL_OUTOF10: 0
PAINLEVEL_OUTOF10: 7
PAINLEVEL_OUTOF10: 0
PAINLEVEL_OUTOF10: 7
PAINLEVEL_OUTOF10: 3
PAINLEVEL_OUTOF10: 7
PAINLEVEL_OUTOF10: 7
PAINLEVEL_OUTOF10: 0
PAINLEVEL_OUTOF10: 5

## 2019-01-01 ASSESSMENT — PAIN DESCRIPTION - ORIENTATION
ORIENTATION: LEFT
ORIENTATION: LEFT
ORIENTATION: LEFT;RIGHT
ORIENTATION: MID
ORIENTATION: MID
ORIENTATION: LOWER;MID
ORIENTATION: LEFT;RIGHT
ORIENTATION: LOWER;MID
ORIENTATION: MID
ORIENTATION: MID
ORIENTATION: RIGHT;LEFT
ORIENTATION: LEFT

## 2019-01-01 ASSESSMENT — PAIN DESCRIPTION - FREQUENCY
FREQUENCY: CONTINUOUS
FREQUENCY: INTERMITTENT
FREQUENCY: CONTINUOUS
FREQUENCY: INTERMITTENT

## 2019-01-01 ASSESSMENT — ANXIETY QUESTIONNAIRES: GAD7 TOTAL SCORE: 3

## 2019-01-01 ASSESSMENT — PAIN DESCRIPTION - LOCATION
LOCATION: BUTTOCKS
LOCATION: BACK
LOCATION: BACK
LOCATION: BUTTOCKS
LOCATION: BUTTOCKS
LOCATION: LEG;BUTTOCKS
LOCATION: BUTTOCKS;LEG
LOCATION: BACK
LOCATION: BACK
LOCATION: BUTTOCKS
LOCATION: BUTTOCKS;LEG
LOCATION: BUTTOCKS
LOCATION: GENERALIZED
LOCATION: LEG
LOCATION: BUTTOCKS

## 2019-01-01 ASSESSMENT — PAIN DESCRIPTION - DESCRIPTORS
DESCRIPTORS: ACHING;DISCOMFORT
DESCRIPTORS: ACHING
DESCRIPTORS: ACHING;SPASM
DESCRIPTORS: ACHING
DESCRIPTORS: ACHING;CONSTANT
DESCRIPTORS: ACHING
DESCRIPTORS: ACHING
DESCRIPTORS: ACHING;CONSTANT
DESCRIPTORS: ACHING
DESCRIPTORS: ACHING;CONSTANT
DESCRIPTORS: ACHING
DESCRIPTORS: ACHING
DESCRIPTORS: ACHING;SPASM

## 2019-01-01 ASSESSMENT — PAIN SCALES - WONG BAKER
WONGBAKER_NUMERICALRESPONSE: 0

## 2019-01-01 ASSESSMENT — LIFESTYLE VARIABLES: HOW OFTEN DO YOU HAVE A DRINK CONTAINING ALCOHOL: 0

## 2019-01-01 ASSESSMENT — PATIENT HEALTH QUESTIONNAIRE - PHQ9: SUM OF ALL RESPONSES TO PHQ QUESTIONS 1-9: 13

## 2019-03-03 PROBLEM — F33.41 RECURRENT MAJOR DEPRESSION IN PARTIAL REMISSION (HCC): Status: ACTIVE | Noted: 2019-01-01

## 2019-03-15 PROBLEM — L89.223 PRESSURE ULCER OF LEFT HIP, STAGE 3 (HCC): Status: ACTIVE | Noted: 2019-01-01

## 2019-03-15 PROBLEM — L89.154 PRESSURE ULCER OF COCCYGEAL REGION, STAGE 4 (HCC): Status: ACTIVE | Noted: 2019-01-01

## 2019-03-15 PROBLEM — L89.103 PRESSURE INJURY OF LOWER BACK, STAGE 3 (HCC): Status: ACTIVE | Noted: 2019-01-01

## 2019-03-22 PROBLEM — L89.90 INFECTED DECUBITUS ULCER, UNSPECIFIED PRESSURE ULCER STAGE: Status: ACTIVE | Noted: 2019-01-01

## 2019-03-22 PROBLEM — Z85.048 HISTORY OF ANAL CANCER: Status: ACTIVE | Noted: 2019-01-01

## 2019-03-22 PROBLEM — R09.02 HYPOXIA: Status: ACTIVE | Noted: 2019-01-01

## 2019-03-22 PROBLEM — I95.9 HYPOTENSION: Status: ACTIVE | Noted: 2019-01-01

## 2019-03-22 PROBLEM — L08.9 INFECTED DECUBITUS ULCER, UNSPECIFIED PRESSURE ULCER STAGE: Status: ACTIVE | Noted: 2019-01-01

## 2019-03-22 PROBLEM — E46 MALNUTRITION (HCC): Status: ACTIVE | Noted: 2019-01-01

## 2019-03-22 PROBLEM — R13.10 DYSPHAGIA: Status: ACTIVE | Noted: 2019-01-01
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
## 2019-03-22 NOTE — PROGRESS NOTES
Upon arrival to ED pt noted to be hypoxic, O2 sat 80% on RA. Pt initially placed on nasal cannula and attempted to maintain sats > 93 on n/c however could not maintain sats >93, pt placed on non-re breather with sats increased to 96-98%. Message sent to Dr. Vilma Carmona and came to bedside to see pt. Dr. Vilma Carmona stated ok to access pt's port. Lab called to notify that pt needs labs drawn.   Leila Mccormack RN

## 2019-03-22 NOTE — CONSULTS
Nutrition Assessment     Type and Reason for Visit: Initial, Consult    Nutrition Recommendations:     ENTERAL NUTRITION  1. Recommend order \"Diet: Tube feed continuous/ NPO\". Initiate Jevity 1.5 formula at 20 mL/hr and as tolerated, increase by 20 mL/hr q 4 hours until goal of 45 mL/hr is met per PEG. 2. Suggest 150 mL H20 q 4 hours for 100% fluid recommendations when no IVF. 3. Recommend adding 1 bottle Healthy Shot or Proteinex P2Go protein supplement. Flush tube w/ 30-60 mL H20 before and after administration. 4. Monitor closely and correct lytes (K, Phos, Mg) before progressing TF to goal d/t risk of refeeding syndrome (hx extended inadequate nutritional intake). 5. Monitor for tolerance (bowel habits, N/V, cramping, abdominal distention). Nutrition Assessment: Consult for tube feed ordering and management. Patient nutritionally compromised as she has severe muscle and fat wasting with significant wt loss over the past 7 months. Patient has had PEG (placed 2011 per EMR review) and  reports chronic N/V with intolerance to po intake. Patient is paraplegic since 2008. CBW of 98# with BMI of 16.9, patient meets ASPEN criteria for severe protein calorie malnutrition.  reports providing bolus feeds of 3-5 cans of Ensure a day, likely not meeting nutrition needs. Patient also with multiple non-healing wounds requiring wound vac. Will order EN to meet 100% of patients nutrition needs and promote wt gain. Malnutrition Assessment:  · Malnutrition Status: Meets the criteria for severe malnutrition  · Context: Chronic illness  · Findings of the 6 clinical characteristics of malnutrition (Minimum of 2 out of 6 clinical characteristics is required to make the diagnosis of moderate or severe Protein Calorie Malnutrition based on AND/ASPEN Guidelines):  1. Energy Intake-Less than or equal to 75% of estimated energy requirement, Greater than or equal to 3 months    2.  Weight Loss-(15%), (7 months)  3. Fat Loss-Severe subcutaneous fat loss, Orbital, Triceps, Fat overlying the ribs  4. Muscle Loss-Severe muscle mass loss, Temples (temporalis muscle), Clavicles (pectoralis and deltoids), Calf (gastrocnemius)  5. Fluid Accumulation-No significant fluid accumulation,    6.  Strength-Not measured    Nutrition Risk Level: High    Nutrition Needs:  · Estimated Daily Total Kcal: 7046-3630  · Estimated Daily Protein (g): 67-90(1.5-2)  · Estimated Daily Fluid (ml/day): 0892-6907 ml    Nutrition Diagnosis:   · Problem: Severe malnutrition  · Etiology: related to Insufficient energy/nutrient consumption     Signs and symptoms:  as evidenced by Diet history of poor intake, Presence of wounds, BMI, Severe loss of subcutaneous fat, Severe muscle loss    Objective Information:  · Nutrition-Focused Physical Findings: +BM 3/22  · Wound Type: Multiple, Open Wounds  · Current Nutrition Therapies:  · Oral Diet Orders: NPO   · Oral Diet intake: NPO  · Oral Nutrition Supplement (ONS) Orders: None  · ONS intake: NPO  · Tube Feeding (TF) Orders:   · Feeding Route:  PEG  · Goal TF & Flush Orders Provides: RD Goal: Jevity 1.5 at goal rate of 45 ml/hr plus one bottle proteinex 2 go will provide 1080 ml TV, 1724 kcal, 95 gm protein and 820 ml free water. Rec'd water flsuh 150 ml Q4 hrs.    · Anthropometric Measures:  · Ht: 5' 4.17\" (163 cm)   · Current Body Wt: 98 lb (44.5 kg)  · Weight Change: 15% loss ~7 months   · Ideal Body Wt: 120 lb (54.4 kg), % Ideal Body 82%  · BMI Classification: BMI <18.5 Underweight    Nutrition Interventions:   Continue NPO, Start Tube Feeding  Continued Inpatient Monitoring    Nutrition Evaluation:   · Evaluation: Goals set   · Goals: Patient will tolerate EN to meet 100% of nutrition needs   · Monitoring: Nutrition Progression, TF Intake, TF Tolerance, Wound Healing, Pertinent Labs      Electronically signed by Vi Gutierres RD, LD on 3/22/19 at 4:55 PM    Contact Number: [phone]
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
## 2019-03-22 NOTE — PROGRESS NOTES
Patient arrived from PCU to ICU room 4522. She is A&OX3. VSS. She is on 15L high flow nasal cannula. Patient congested with a weak cough. Rhonchi lung sounds throughout. Attempted NT suctioning by RT, patient did not tolerate well. CHG bath completed. Safety precautions in place.  Will continue to monitor

## 2019-03-22 NOTE — SIGNIFICANT EVENT
After continued discussion w/ physicians (Dr. Luciana Plaza, Dr. Santy Echavarria) present, pt decided to change her code status to FULL CODE and agreed to aggressive measures. She was transferred to ICU after this. Palliative care was notified and will continue to follow.     Graciela Ludwig MD   PGY-1

## 2019-03-22 NOTE — PROGRESS NOTES
SBP is in the high 70's (78/61). Just prior to initiating normal saline bolus. Medical resident and Dr. Jas Barnett are aware and plan to send pt to the ICU. Will continue to monitor pt's status.

## 2019-03-22 NOTE — PROGRESS NOTES
Patient was brought to room 4461 from the ED. Vitals and assessment are as noted. Call light is within pt's reach. Initiated admission process.
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
## 2019-03-23 NOTE — PROGRESS NOTES
Pt left floor for CT scan at 2210. Pt blood pressure decreased from 115/69mmHg to 85/50mmHg. Residents notified and 500mL bolus administered followed by infusion at 100mL/hr. Levo started at 2mcg/min when pt returned to floor but only needed for 30min before stopping. Purewick catheter in place but not catching all of urine. Bed pad saturated and changed twice. Pt still c/o of chronic pain but sleeps between assessments.
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
## 2019-03-23 NOTE — CONSULTS
Medications Prior to Admission:    Current Facility-Administered Medications on File Prior to Encounter   Medication Dose Route Frequency Provider Last Rate Last Dose    lidocaine (XYLOCAINE) 4 % external solution 2.5 mL  2.5 mL Topical Once Chloe Mello MD         Current Outpatient Medications on File Prior to Encounter   Medication Sig Dispense Refill    oxyCODONE (OXYCONTIN) 15 MG T12A extended release tablet Take 1 tablet by mouth every 12 hours.  ferrous sulfate 325 (65 Fe) MG tablet Take 325 mg by mouth Daily with lunch      FLUoxetine (PROZAC) 40 MG capsule Take 40 mg by mouth 2 times daily      ranitidine (ZANTAC) 150 MG tablet Take 150 mg by mouth 2 times daily as needed for Heartburn      QUEtiapine (SEROQUEL) 300 MG tablet Take 600 mg by mouth nightly       Balsam [place]-Castor Oil (VENELEX) OINT ointment Apply topically as needed (bed sores / pressure ulcers)       Multiple Vitamins-Minerals (THERAPEUTIC MULTIVITAMIN-MINERALS) tablet Take 1 tablet by mouth daily      polyethylene glycol (GLYCOLAX) packet Take 17 g by mouth daily as needed      phenytoin (DILANTIN) 100 MG ER capsule TAKE ONE CAPSULE BY MOUTH EVERY MORNING AND 2 CAPULES IN THE EVENING 270 capsule 2    folic acid (FOLVITE) 1 MG tablet TAKE ONE TABLET BY MOUTH DAILY 90 tablet 2    gabapentin (NEURONTIN) 600 MG tablet TAKE ONE TABLET BY MOUTH THREE TIMES A DAY 90 tablet 0    promethazine (PHENERGAN) 25 MG tablet TAKE ONE TABLET BY MOUTH EVERY 8 HOURS AS NEEDED FOR NAUSEA 270 tablet 0    baclofen (LIORESAL) 10 MG tablet Take 1 tablet by mouth 3 times daily 270 tablet 1    tiZANidine (ZANAFLEX) 4 MG tablet Take 1 tablet by mouth 2 times daily 180 tablet 1       Allergies:  Patient has no known allergies. Social History:   TOBACCO:   reports that she has been smoking cigarettes. She has a 80.00 pack-year smoking history.  She has never used smokeless tobacco.     ETOH:   reports that she does not drink alcohol. Family History:       Problem Relation Age of Onset    Depression Mother     Hearing Loss Father          Review of Systems   Constitutional: Positive for fatigue. HENT: Negative for congestion. Respiratory: Positive for shortness of breath. Cardiovascular: Negative for chest pain. Gastrointestinal: Negative for abdominal distention. Musculoskeletal: Positive for back pain. Neurological: Positive for weakness. All other systems reviewed and are negative. PHYSICAL EXAM:  BP (!) 118/57   Pulse 89   Temp 98.1 °F (36.7 °C) (Oral)   Resp 27   Ht 5' 4.17\" (1.63 m)   Wt 98 lb 15.8 oz (44.9 kg)   SpO2 94%   BMI 16.90 kg/m²     Physical Exam   Constitutional: She is oriented to person, place, and time. She appears well-developed and well-nourished. HENT:   Head: Normocephalic and atraumatic. Eyes: Pupils are equal, round, and reactive to light. EOM are normal.   Neck: Neck supple. No JVD present. Cardiovascular: Normal rate and regular rhythm. Exam reveals no gallop and no friction rub. No murmur heard. Pulmonary/Chest: Effort normal. No stridor. She has no wheezes. She has rales. Increase work of breathing   Abdominal: Soft. Bowel sounds are normal. She exhibits no distension. There is no tenderness. Musculoskeletal: She exhibits no edema or deformity. Neurological: She is alert and oriented to person, place, and time. Lower extremities weakness with contractures    Skin: Skin is warm and dry. Psychiatric: She has a normal mood and affect. Her behavior is normal.   Vitals reviewed.       LABS:  Recent Labs     03/22/19  1221   WBC 13.1*   HGB 13.9   HCT 42.5                                                                     Recent Labs     03/22/19  1221      K 3.7      CO2 27   BUN 14   CREATININE <0.5*   GLUCOSE 83     Recent Labs     03/22/19  1221   AST 28   ALT 27   BILITOT 0.4   ALKPHOS 241*     No results for input(s): TROPONINI in the last 72 hours. No results for input(s): BNP in the last 72 hours. Lab Results   Component Value Date    PHART 7.483 10/06/2011    NZI9FUZ 37.4 10/06/2011    PO2ART 57.5 10/06/2011     No results for input(s): INR in the last 72 hours. @LABRCNT(NITRITE,COLORU,PHUR,LABCAST,WBCUA,RBCUA,MUCUS,TRICHOMONAS,YEAST,BACTERIA,CLARITYU,SPECGRAV,LEUKOCYTESUR,UROBILINOGEN,BILIRUBINUR,BLOODU,GLUCOSEU,KETONESU,AMORPHOUS)@     Assessment &Plan:    Patient Active Problem List:     Paraplegia ([address]     Depression     Chronic pain syndrome     GERD (gastroesophageal reflux disease)     Urinary incontinence     Insomnia     Elevated liver enzymes     Anemia     Elevated blood sugar     Seizure disorder (HCC)     Smoker     Pain medication agreement signed     Iron deficiency anemia due to dietary causes     S/P percutaneous endoscopic gastrostomy (PEG) tube placement ([facility])     Urge incontinence of urine     Pressure ulcer of coccygeal region, unspecified pressure ulcer stage     Pressure ulcer of heel, right, unstageable ([address]     Pure hypercholesterolemia     Recurrent major depression in partial remission ([address]     Pressure injury of lower back, stage 3 (HCC)     Pressure ulcer of coccygeal region, stage 4 ([address]     Pressure ulcer of left hip, stage 3 (HCC)     History of anal cancer     Infected decubitus ulcer, unspecified pressure ulcer stage     Malnutrition (HCC)     Hypoxia     SOB (shortness of breath)     Generalized pain     Nausea and vomiting     Goals of care, counseling/discussion     DNR (do not resuscitate) discussion     Encounter for palliative care     Dysphagia     Hypotension      Acute hypoxic respiratory failure, has upper airway crackles. No definite consolidation on CXR however she has increased interstitial markings  She has hx of PE and she is high risk with her paraplegia. She has PEG tube and risk for aspiration too.       Plan  Transfer to ICU  Get CT chest PE protocol   Continue vanc and
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
## 2019-03-23 NOTE — PROGRESS NOTES
RN able to obtain IV access in preparation for CT with contrast. Residents notified and Dr. Gwyn Marrero at bedside to attempt an ultrasound placed IV. Pt c/o of back and buttock pain. Pain decreased from a 10/10 to an 8/10 severity after administration of pain medication. Pt has very coarse, wet lung sounds and is unable to bring up secretions with cough.
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
## 2019-03-23 NOTE — PROGRESS NOTES
Wound care performed per order. Wound to mid back abrasion with darkened area, no open areas. Wound to lower back open, with purulent drainage and tunneling present. Wound to sacrum open with serosanguinous and purulent drainage and tunneling present. Wound to L hip open with purulent drainage and tunneling. All sites gently cleansed with sterile saline, packed with aquacel and covered with mepilex. Pt c/o of pain during dressing changes but tolerated well. Will continue to monitor.
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
## 2019-03-23 NOTE — PROGRESS NOTES
Hospitalist Progress Note    PCP: Tolu Arauz MD    Date of Admission: 3/22/2019  Hospital Day: 1      Chief Complaint:  Nausea and vomiting, dysphagia, chronic nonhealing decubitus ulcers          Hospital Course: admitted for above complaints, then transferred to ICU for profound hypoxia and hypotension      Subjective:   Patient seen and examined  Hemodynamics improved with IV fluids  Still continues to require high FiO2  Reports subjective improvement, no new complaints. Ancillary notes reviewed    Medications:  Reviewed    Infusion Medications    sodium chloride 50 mL/hr at 03/23/19 1102    norepinephrine Stopped (03/23/19 0001)     Scheduled Medications    vancomycin  750 mg Intravenous Q12H    sodium chloride flush  10 mL Intravenous 2 times per day    enoxaparin  40 mg Subcutaneous Daily    baclofen  10 mg PEG Tube TID    famotidine  20 mg PEG Tube BID    FLUoxetine  40 mg Per G Tube BID    folic acid  1 mg PEG Tube Lunch    gabapentin  600 mg PEG Tube TID    polyethylene glycol  17 g Per G Tube Daily    QUEtiapine  600 mg PEG Tube Nightly    tiZANidine  4 mg PEG Tube BID WC    phenytoin  100 mg PEG Tube TID    oxyCODONE  5 mg PEG Tube Q4H    piperacillin-tazobactam  3.375 g Intravenous Q8H     PRN Meds: sodium chloride flush, ondansetron, magnesium hydroxide, promethazine      Intake/Output Summary (Last 24 hours) at 3/23/2019 1438  Last data filed at 3/23/2019 1415  Gross per 24 hour   Intake 4517.37 ml   Output 800 ml   Net 3717.37 ml       Exam:    /77   Pulse 84   Temp 97.5 °F (36.4 °C) (Oral)   Resp 29   Ht 5' 4.17\" (1.63 m)   Wt 96 lb 9 oz (43.8 kg)   SpO2 94%   BMI 16.49 kg/m²       General appearance: No apparent distress, appears stated age and cooperative. HEENT: Pupils equal, round, and reactive to light. Conjunctivae/corneas clear. Neck: Supple, with full range of motion. No jugular venous distention. Trachea midline.   Respiratory:  Bilateral rhonchi, normal respiratory effort  Cardiovascular: Regular rate and rhythm with normal S1/S2 without murmurs, rubs or gallops. Abdomen: Soft, non-tender, non-distended with normal bowel sounds. Musculoskeletal: No clubbing, cyanosis or edema bilaterally. Full range of motion without deformity. Skin: Skin color, texture, turgor normal.  No rashes or lesions. Neurologic:  Neurovascularly intact without any focal sensory/motor deficits. Cranial nerves: II-XII intact, grossly non-focal.  Psychiatric: Alert and oriented, thought content appropriate, normal insight  Capillary Refill: Brisk,< 3 seconds   Peripheral Pulses: +2 palpable, equal bilaterally       Labs:   Recent Labs     03/22/19  1221 03/23/19  0416   WBC 13.1* 8.2   HGB 13.9 11.2*   HCT 42.5 34.8*    216     Recent Labs     03/22/19  1221 03/22/19  1650 03/23/19  0416     --  142   K 3.7  --  3.7     --  108   CO2 27  --  23   BUN 14  --  11   CREATININE <0.5*  --  <0.5*   CALCIUM 8.8  --  7.2*   PHOS  --  3.4  --      Recent Labs     03/22/19  1221   AST 28   ALT 27   BILIDIR <0.2   BILITOT 0.4   ALKPHOS 241*     No results for input(s): INR in the last 72 hours. No results for input(s): Holcomb Specking in the last 72 hours. Urinalysis:      Lab Results   Component Value Date    NITRU POSITIVE 03/23/2019    WBCUA 3-5 03/23/2019    BACTERIA 1+ 03/23/2019    RBCUA 3-5 03/23/2019    BLOODU SMALL 03/23/2019    SPECGRAV 1.010 03/23/2019    GLUCOSEU Negative 03/23/2019    GLUCOSEU 100 09/30/2011    GLUCOSEU 100 09/30/2011       Radiology:  CT ABDOMEN PELVIS W IV CONTRAST Additional Contrast? None   Final Result      1. No CT findings for pulmonary thromboembolism on the current study. 2.  Bilateral upper lobe predominant emphysema. 3.  Collapse of the left lower lobe with scattered air bronchograms. 3.  Coronary artery calcification.             CT ABDOMEN AND PELVIS WITH CONTRAST      HISTORY: Decubitus ulcers evaluate for possible osteomyelitis      TECHNIQUE: Thin section axial images obtained through the abdomen pelvis. 80 mL Isovue-370 given intravenously. Multiplanar reconstruction images received for interpretation. Individualized dose optimization technique was used in order to meet ALARA    standards for radiation dose reduction. In addition to vendor specific dose reduction algorithms, the dose reduction techniques vary based on the specific scanner utilized but frequently include automated exposure control, adjustment of the mA and/or kV    according to patient size, and use of iterative reconstruction technique. FINDINGS: The liver, spleen, pancreas, and adrenal glands unremarkable. Gallbladder is moderately distended without stones or wall thickening. There is prominence of the common bile duct which measures 11 mm in greatest dimension. This tapers near the    level of the ampulla. There is mild prominence of the pancreatic duct. Right kidney demonstrating uniform enhancement. Renal cortical cysts scattered throughout the right kidney measuring less than a centimeter in short axis. There is marked long-standing hydronephrosis within the left kidney with marked cortical    thinning. 10 mm calcification in the upper pole left kidney. Left ureter is normal in caliber. IVC filter noted. No mesenteric or retroperitoneal lymphadenopathy. No free fluid collection seen. Bowel loops are not opacified, demonstrating no findings for obstruction. No discrete bowel wall thickening or mass identified. Within the pelvis, bladder is minimally distended. Calcifications in the retroareolar aspect of the bladder posteriorly may reflect small bladder stones. Patient status post hysterectomy. No pelvic lymphadenopathy seen. Decubitus ulcers overlie the lower sacrum. There is severe bony demineralization throughout the osseous structures.   Mild thinning of the posterior cortex overlying the lower sacrum is noted. The possibility for moderately as well as would be difficult    to entirely excluded in this study however evaluation is limited. There is extensive multilevel degenerative changes in the spine with extensive multilevel degenerative spondylosis. Severe arthritic changes in the hips. Orthopedic randolph with proximal    fixation in the proximal right femur. IMPRESSION:      1. Soft tissue ulceration overlying the lower sacrum consistent with history of decubitus ulcers. Mild thinning of the underlying cortex in the sacrum at this level is noted and the possibly for mild or early osteomyelitis cannot be entirely excluded    in this study. Study is severely limited due to severe bony demineralization. Correlate clinically. 2.  Chronic severe left-sided hydronephrosis with severe cortical thinning likely secondary to chronic UPJ obstruction. 3.  Distended gallbladder with mild prominence of the common bile duct and pancreatic duct. Note discrete stones seen. Possibly for small obstructing ampullary lesion is not excluded. CTA PULMONARY W CONTRAST   Final Result      1. No CT findings for pulmonary thromboembolism on the current study. 2.  Bilateral upper lobe predominant emphysema. 3.  Collapse of the left lower lobe with scattered air bronchograms. 3.  Coronary artery calcification. CT ABDOMEN AND PELVIS WITH CONTRAST      HISTORY: Decubitus ulcers evaluate for possible osteomyelitis      TECHNIQUE: Thin section axial images obtained through the abdomen pelvis. 80 mL Isovue-370 given intravenously. Multiplanar reconstruction images received for interpretation. Individualized dose optimization technique was used in order to meet ALARA    standards for radiation dose reduction.   In addition to vendor specific dose reduction algorithms, the dose reduction techniques vary based on the specific scanner utilized but frequently include automated ulcers. Mild thinning of the underlying cortex in the sacrum at this level is noted and the possibly for mild or early osteomyelitis cannot be entirely excluded    in this study. Study is severely limited due to severe bony demineralization. Correlate clinically. 2.  Chronic severe left-sided hydronephrosis with severe cortical thinning likely secondary to chronic UPJ obstruction. 3.  Distended gallbladder with mild prominence of the common bile duct and pancreatic duct. Note discrete stones seen. Possibly for small obstructing ampullary lesion is not excluded. XR CHEST PORTABLE   Final Result   Impression: Mild diffuse interstitial prominence present underlying interstitial lung disease or mild edema. Assessment/Plan:    Active Hospital Problems    Diagnosis    Hypoxia [R09.02]     Priority: High    Hypotension [I95.9]     Priority: High    Infection [B99.9]     Priority: High    Infected decubitus ulcer, unspecified pressure ulcer stage [L89.90, L08.9]    Dysphagia [R13.10]    History of anal cancer [Z85.048]    Malnutrition ([address] [E46]    S/P percutaneous endoscopic gastrostomy (PEG) tube placement (HCC) [Z93.1]    Paraplegia ([facility]) [G82.20]       CT-PA negative for PE, did demonstrate collapse in left lower lobe  No obvious pneumonia or mass. Will check echo with bubble to rule out shunting  On empiric ABx: Vanc + zosyn    GI following for dysphagia, appreciate help.  Will likely need EGD once stable    Will start tube feeds once pt more stable    gen surg following for non healing decub ulcers  CT: osteomyelitis cannot be entirely excluded  On vanc + zosyn    Continue home medications, hold parameters for narcotics      DVT Prophylaxis: lovenox  Diet: Dietary Nutrition Supplements: Protein Modular  DIET GENERAL;  Code Status: Full Code    PT/OT Eval Status: pt at baseline    Dispo - inpatient, ICU    Dennis Leal MD  2:38 PM 3/23/2019

## 2019-03-23 NOTE — PROGRESS NOTES
RESPIRATORY THERAPY ASSESSMENT    Name:  [address] [place] [address] Record Number:  [id number]  Age: 70 y.o. Gender: female  : 1955  Today's Date:  3/23/2019  Room:  [facility]45-    Assessment     Is the patient being admitted for a COPD or Asthma exacerbation? No   (If yes the patient will be seen every 4 hours for the first 24 hours and then reassessed)    Patient Admission Diagnosis      Allergies  No Known Allergies    Minimum Predicted Vital Capacity:     816          Actual Vital Capacity:      500              Pulmonary History:COPD and Current Smoker  Home Oxygen Therapy:  room air  Home Respiratory Therapy:Albuterol   Current Respiratory Therapy:  DuoNeb HHN QID; Pulmicort HHN BID  Treatment Type: IS       Respiratory Severity Index(RSI)   Patients with orders for inhalation medications, oxygen, or any therapeutic treatment modality will be placed on Respiratory Protocol. They will be assessed with the first treatment and at least every 72 hours thereafter. The following severity scale will be used to determine frequency of treatment intervention.     Smoking History: Pulmonary Disease or Smoking History, Greater than 15 pack year = 2    Social History  Social History     Tobacco Use    Smoking status: Current Every Day Smoker     Packs/day: 2.00     Years: 40.00     Pack years: 80.00     Types: Cigarettes    Smokeless tobacco: Never Used    Tobacco comment:  on cessation- 2016   Substance Use Topics    Alcohol use: No     Alcohol/week: 0.0 oz    Drug use: No       Recent Surgical History: None = 0  Past Surgical History  Past Surgical History:   Procedure Laterality Date    BACK SURGERY      x3 surgeries lumbar & 1 to cervical spine     BLADDER REPAIR      ruptured bladder after fell off of horse    CAROTID ENDARTERECTOMY      right    DEBRIDEMENT  10/1/2011    left groin and left hip debridement    FRACTURE SURGERY      karon. pelvic fx , Rt femur fx repair 11    GASTROSTOMY TUBE PLACEMENT      HERNIA REPAIR      x2    LEG DEBRIDEMENT  4/21/11    non healing wounds left posterior leg    MASTECTOMY      bilateral    SKIN CANCER EXCISION  8/18/11    invasive SCC left groin    TONSILLECTOMY      VENA CAVA FILTER PLACEMENT         Level of Consciousness: Alert, Oriented, and Cooperative = 0    Level of Activity: Non weight bearing- transfers bed to chair only = 3    Respiratory Pattern: Regular Pattern; RR 8-20 = 0    Breath Sounds: Diminshed bilaterally and/or crackles = 2    Sputum   ,  , Sputum How Obtained: Nasal  Cough: Weak, non-productive = 3    Vital Signs   /72   Pulse 77   Temp 98.3 °F (36.8 °C) (Oral)   Resp 25   Ht 5' 4.17\" (1.63 m)   Wt 96 lb 9 oz (43.8 kg)   SpO2 100%   BMI 16.49 kg/m²   SPO2 (COPD values may differ): Less than 86% on room air or greater than 92% on FiO2 greater than 50% = 4    Peak Flow (asthma only): not applicable = 0    RSI: 05-89 = Q4WA (every four hours while awake) and Q4hrs PRN        Plan       Goals: medication delivery, mobilize retained secretions, volume expansion and improve oxygenation    Patient/caregiver was educated on the proper method of use for Respiratory Care Devices:  Yes      Level of patient/caregiver understanding able to:   ? Verbalize understanding   ? Demonstrate understanding       ? Teach back        ? Needs reinforcement       ? No available caregiver               ? Other:     Response to education:  Excellent     Is patient being placed on Home Treatment Regimen? No     Comments: Chart reviewed    Plan of Care: Pt to continue DuoNeb JAMESN QID     Electronically signed by Alena Yang RCP on 3/23/2019 at 6:57 PM    Respiratory Protocol Guidelines     1. Assessment and treatment by Respiratory Therapy will be initiated for medication and therapeutic interventions upon initiation of aerosolized medication.   2. Physician will be contacted for respiratory rate (RR) greater than 35 breaths per minute. Therapy will be held for heart rate (HR) greater than 140 beats per minute, pending direction from physician. 3. Bronchodilators will be administered via Metered Dose Inhaler (MDI) with spacer when the following criteria are met:  a. Alert and cooperative     b. HR < 140 bpm  c. RR < 30 bpm                d. Can demonstrate a 2-3 second inspiratory hold  4. Bronchodilators will be administered via Hand Held Nebulizer D[facility]) to patients when ANY of the following criteria are met  a. Incognizant or uncooperative          b. Patients treated with HHN at Home        c. Unable to demonstrate proper use of MDI with spacer     d. RR > 30 bpm   5. Bronchodilators will be delivered via Metered Dose Inhaler (MDI), HHN, Aerogen to intubated patients on mechanical ventilation. 6. Inhalation medication orders will be delivered and/or substituted as outlined below. Aerosolized Medications Ordering and Administration Guidelines:    1. All Medications will be ordered by a physician, and their frequency and/or modality will be adjusted as defined by the patients Respiratory Severity Index (RSI) score. 2. If the patient does not have documented COPD, consider discontinuing anticholinergics when RSI is less than 9.  3. If the bronchospasm worsens (increased RSI), then the bronchodilator frequency can be increased to a maximum of every 4 hours. If greater than every 4 hours is required, the physician will be contacted. 4. If the bronchospasm improves, the frequency of the bronchodilator can be decreased, based on the patient's RSI, but not less than home treatment regimen frequency. 5. Bronchodilator(s) will be discontinued if patient has a RSI less than 9 and has received no scheduled or as needed treatment for 72  Hrs. Patients Ordered on a Mucolytic Agent:    1. Must always be administered with a bronchodilator.     2. Discontinue if patient experiences worsened bronchospasm, or secretions have lessened to the

## 2019-03-24 NOTE — PROGRESS NOTES
Discussed with patient lab sticks and PIV insertion. Pt states, \"I just didn't want anymore needle pokes today. It was too much (this morning). \" pt states that she may allow labs to be drawn later today. ICU residents aware, at bedside this AM discussing above with patient. Pt also refuses to be turned at this time. Will continue to monitor and encourage turns q2hrs.

## 2019-03-24 NOTE — PROGRESS NOTES
Respiratory Therapy Bronchoscopy Assist      Name:  [address] [place] [address] Record Number:  [id number]  Age: 70 y.o. Gender: female  : 1955  Today's Date:  3/24/2019  Room:  [address] [place] [address]      BAL cath samples obtained during bronchoscopy assist with patient on 15L High flow cannula. Sterile field maintained throughout procedure. Patient was pre-sedated. 40 mL of saline instilled. 30 mL of clear fluid obtained. Pt tolerated procedure well. Samples sent to lab for testing. Patient SpO2 within acceptable range throughout bronchscopy procedure. Physician assisted with bronch without complications.  Bronchoscope ID: [id number]; [id number]    Patient/caregiver was educated on the proper method of use:  Yes      Level of patient/caregiver understanding able to:   [x] Verbalize understanding   [] Demonstrate understanding       [] Teach back        [] Needs reinforcement       []  No available caregiver               []  Other:     Response to education:  Excellent     Electronically signed by Alena Yang RCP on 3/24/2019 at 7:28 PM
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
## 2019-03-24 NOTE — PROGRESS NOTES
Pt expressed she doesn't want to be poked or have any more needle sticks. Port needle changed this AM.  No blood return from port. Cath gabriele was tried this AM, no success of blood return.   Will pass along to day shift  New tube feeding hung this AM.

## 2019-03-24 NOTE — PROGRESS NOTES
Hospitalist Progress Note    PCP: Moisés Cazares MD    Date of Admission: 3/22/2019  Hospital Day: 2      Chief Complaint:  Nausea and vomiting, dysphagia, chronic nonhealing decubitus ulcers          Hospital Course: admitted for above complaints, then transferred to ICU for profound hypoxia and hypotension      Subjective:   Patient seen and examined  Low BP today, possibly related to pain medication  Hypoxia improving, down to 5L NC  Denies any new complaints    Ancillary notes reviewed    Medications:  Reviewed    Infusion Medications    norepinephrine Stopped (03/23/19 0001)     Scheduled Medications    sodium chloride  500 mL Intravenous Once    vancomycin  750 mg Intravenous Q12H    ipratropium-albuterol  1 ampule Inhalation Q4H WA    budesonide  0.5 mg Nebulization BID    nicotine  1 patch Transdermal Daily    sodium chloride flush  10 mL Intravenous 2 times per day    enoxaparin  40 mg Subcutaneous Daily    baclofen  10 mg PEG Tube TID    famotidine  20 mg PEG Tube BID    FLUoxetine  40 mg Per G Tube BID    folic acid  1 mg PEG Tube Lunch    gabapentin  600 mg PEG Tube TID    polyethylene glycol  17 g Per G Tube Daily    QUEtiapine  600 mg PEG Tube Nightly    tiZANidine  4 mg PEG Tube BID WC    phenytoin  100 mg PEG Tube TID    oxyCODONE  5 mg PEG Tube Q4H    piperacillin-tazobactam  3.375 g Intravenous Q8H     PRN Meds: sodium chloride flush, ondansetron, magnesium hydroxide, promethazine      Intake/Output Summary (Last 24 hours) at 3/24/2019 1350  Last data filed at 3/24/2019 1200  Gross per 24 hour   Intake 3903.33 ml   Output 2605 ml   Net 1298.33 ml       Exam:    /67   Pulse 72   Temp 98.4 °F (36.9 °C) (Oral)   Resp 28   Ht 5' 4.17\" (1.63 m)   Wt 99 lb 10.4 oz (45.2 kg)   SpO2 92%   BMI 17.01 kg/m²       General appearance: No apparent distress, appears stated age and cooperative. HEENT: Pupils equal, round, and reactive to light.  Conjunctivae/corneas clear.  Neck: Supple, with full range of motion. No jugular venous distention. Trachea midline. Respiratory:  Bilateral rhonchi, normal respiratory effort  Cardiovascular: Regular rate and rhythm with normal S1/S2 without murmurs, rubs or gallops. Abdomen: Soft, non-tender, non-distended with normal bowel sounds. Musculoskeletal: No clubbing, cyanosis or edema bilaterally. Full range of motion without deformity. Skin: Skin color, texture, turgor normal.  No rashes or lesions. Neurologic:  Neurovascularly intact without any focal sensory/motor deficits. Cranial nerves: II-XII intact, grossly non-focal.  Psychiatric: Alert and oriented, thought content appropriate, normal insight  Capillary Refill: Brisk,< 3 seconds   Peripheral Pulses: +2 palpable, equal bilaterally       Labs:   Recent Labs     03/22/19  1221 03/23/19  0416   WBC 13.1* 8.2   HGB 13.9 11.2*   HCT 42.5 34.8*    216     Recent Labs     03/22/19  1221 03/22/19  1650 03/23/19  0416     --  142   K 3.7  --  3.7     --  108   CO2 27  --  23   BUN 14  --  11   CREATININE <0.5*  --  <0.5*   CALCIUM 8.8  --  7.2*   PHOS  --  3.4  --      Recent Labs     03/22/19  1221   AST 28   ALT 27   BILIDIR <0.2   BILITOT 0.4   ALKPHOS 241*     No results for input(s): INR in the last 72 hours. No results for input(s): Vinod Dusky in the last 72 hours. Urinalysis:      Lab Results   Component Value Date    NITRU POSITIVE 03/23/2019    WBCUA 3-5 03/23/2019    BACTERIA 1+ 03/23/2019    RBCUA 3-5 03/23/2019    BLOODU SMALL 03/23/2019    SPECGRAV 1.010 03/23/2019    GLUCOSEU Negative 03/23/2019    GLUCOSEU 100 09/30/2011    GLUCOSEU 100 09/30/2011       Radiology:  CT ABDOMEN PELVIS W IV CONTRAST Additional Contrast? None   Final Result      1. No CT findings for pulmonary thromboembolism on the current study. 2.  Bilateral upper lobe predominant emphysema. 3.  Collapse of the left lower lobe with scattered air bronchograms. 3.  Coronary artery calcification. CT ABDOMEN AND PELVIS WITH CONTRAST      HISTORY: Decubitus ulcers evaluate for possible osteomyelitis      TECHNIQUE: Thin section axial images obtained through the abdomen pelvis. 80 mL Isovue-370 given intravenously. Multiplanar reconstruction images received for interpretation. Individualized dose optimization technique was used in order to meet ALARA    standards for radiation dose reduction. In addition to vendor specific dose reduction algorithms, the dose reduction techniques vary based on the specific scanner utilized but frequently include automated exposure control, adjustment of the mA and/or kV    according to patient size, and use of iterative reconstruction technique. FINDINGS: The liver, spleen, pancreas, and adrenal glands unremarkable. Gallbladder is moderately distended without stones or wall thickening. There is prominence of the common bile duct which measures 11 mm in greatest dimension. This tapers near the    level of the ampulla. There is mild prominence of the pancreatic duct. Right kidney demonstrating uniform enhancement. Renal cortical cysts scattered throughout the right kidney measuring less than a centimeter in short axis. There is marked long-standing hydronephrosis within the left kidney with marked cortical    thinning. 10 mm calcification in the upper pole left kidney. Left ureter is normal in caliber. IVC filter noted. No mesenteric or retroperitoneal lymphadenopathy. No free fluid collection seen. Bowel loops are not opacified, demonstrating no findings for obstruction. No discrete bowel wall thickening or mass identified. Within the pelvis, bladder is minimally distended. Calcifications in the retroareolar aspect of the bladder posteriorly may reflect small bladder stones. Patient status post hysterectomy. No pelvic lymphadenopathy seen. Decubitus ulcers overlie the lower sacrum. There is severe bony demineralization throughout the osseous structures. Mild thinning of the posterior cortex overlying the lower sacrum is noted. The possibility for moderately as well as would be difficult    to entirely excluded in this study however evaluation is limited. There is extensive multilevel degenerative changes in the spine with extensive multilevel degenerative spondylosis. Severe arthritic changes in the hips. Orthopedic randolph with proximal    fixation in the proximal right femur. IMPRESSION:      1. Soft tissue ulceration overlying the lower sacrum consistent with history of decubitus ulcers. Mild thinning of the underlying cortex in the sacrum at this level is noted and the possibly for mild or early osteomyelitis cannot be entirely excluded    in this study. Study is severely limited due to severe bony demineralization. Correlate clinically. 2.  Chronic severe left-sided hydronephrosis with severe cortical thinning likely secondary to chronic UPJ obstruction. 3.  Distended gallbladder with mild prominence of the common bile duct and pancreatic duct. Note discrete stones seen. Possibly for small obstructing ampullary lesion is not excluded. CTA PULMONARY W CONTRAST   Final Result      1. No CT findings for pulmonary thromboembolism on the current study. 2.  Bilateral upper lobe predominant emphysema. 3.  Collapse of the left lower lobe with scattered air bronchograms. 3.  Coronary artery calcification. CT ABDOMEN AND PELVIS WITH CONTRAST      HISTORY: Decubitus ulcers evaluate for possible osteomyelitis      TECHNIQUE: Thin section axial images obtained through the abdomen pelvis. 80 mL Isovue-370 given intravenously. Multiplanar reconstruction images received for interpretation. Individualized dose optimization technique was used in order to meet ALARA    standards for radiation dose reduction.   In addition to vendor specific dose reduction algorithms, the dose reduction techniques vary based on the specific scanner utilized but frequently include automated exposure control, adjustment of the mA and/or kV    according to patient size, and use of iterative reconstruction technique. FINDINGS: The liver, spleen, pancreas, and adrenal glands unremarkable. Gallbladder is moderately distended without stones or wall thickening. There is prominence of the common bile duct which measures 11 mm in greatest dimension. This tapers near the    level of the ampulla. There is mild prominence of the pancreatic duct. Right kidney demonstrating uniform enhancement. Renal cortical cysts scattered throughout the right kidney measuring less than a centimeter in short axis. There is marked long-standing hydronephrosis within the left kidney with marked cortical    thinning. 10 mm calcification in the upper pole left kidney. Left ureter is normal in caliber. IVC filter noted. No mesenteric or retroperitoneal lymphadenopathy. No free fluid collection seen. Bowel loops are not opacified, demonstrating no findings for obstruction. No discrete bowel wall thickening or mass identified. Within the pelvis, bladder is minimally distended. Calcifications in the retroareolar aspect of the bladder posteriorly may reflect small bladder stones. Patient status post hysterectomy. No pelvic lymphadenopathy seen. Decubitus ulcers overlie the lower sacrum. There is severe bony demineralization throughout the osseous structures. Mild thinning of the posterior cortex overlying the lower sacrum is noted. The possibility for moderately as well as would be difficult    to entirely excluded in this study however evaluation is limited. There is extensive multilevel degenerative changes in the spine with extensive multilevel degenerative spondylosis. Severe arthritic changes in the hips.   Orthopedic randolph with proximal    fixation in the proximal right femur. IMPRESSION:      1. Soft tissue ulceration overlying the lower sacrum consistent with history of decubitus ulcers. Mild thinning of the underlying cortex in the sacrum at this level is noted and the possibly for mild or early osteomyelitis cannot be entirely excluded    in this study. Study is severely limited due to severe bony demineralization. Correlate clinically. 2.  Chronic severe left-sided hydronephrosis with severe cortical thinning likely secondary to chronic UPJ obstruction. 3.  Distended gallbladder with mild prominence of the common bile duct and pancreatic duct. Note discrete stones seen. Possibly for small obstructing ampullary lesion is not excluded. XR CHEST PORTABLE   Final Result   Impression: Mild diffuse interstitial prominence present underlying interstitial lung disease or mild edema. Assessment/Plan:    Active Hospital Problems    Diagnosis    Hypoxia [R09.02]     Priority: High    Hypotension [I95.9]     Priority: High    Infection [B99.9]     Priority: High    Infected decubitus ulcer, unspecified pressure ulcer stage [L89.90, L08.9]    Dysphagia [R13.10]    History of anal cancer [Z85.048]    Malnutrition ([facility] [address] [E46]    S/P percutaneous endoscopic gastrostomy (PEG) tube placement (HCC) [Z93.1]    Paraplegia ([facility]) [G82.20]     Hypotension likely multifactorial insetting of possible infected decubitus ulcers, dysautonomia related to paraplegia, narcotic and muscle relaxant use etc. Check TSH and random cortisol  Fluids prn for sustained low BP    CT-PA negative for PE, did demonstrate collapse in left lower lobe  No obvious pneumonia or mass. Will check echo with bubble to rule out shunting - pending  Hypoxia improving; ? Plan for bronch  On empiric ABx: Vanc + zosyn    GI following for dysphagia, appreciate help.  Will likely need EGD once stable    Will start tube feeds once pt more stable    gen surg following for non healing decub ulcers  CT: osteomyelitis cannot be entirely excluded  On vanc + zosyn    Continue home medications, hold parameters for narcotics      DVT Prophylaxis: lovenox  Diet: Dietary Nutrition Supplements: Protein Modular  Diet NPO, After Midnight  Code Status: Full Code    PT/OT Eval Status: pt at baseline    Dispo - inpatient, ICU     Tristin Burrell MD  1:50 PM 3/24/2019
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
## 2019-03-25 NOTE — CONSULTS
Clinical Pharmacy Note    Please see earlier consult note from today.       Jenny Joe., PharmD, [address] [facility]  Phone: [phone]  3/25/2019 3:11 PM
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
## 2019-03-25 NOTE — PROGRESS NOTES
[facility] Wound Ostomy Continence Nurse  Follow-up Progress Note       NAME:  Jett Burciaga  MEDICAL RECORD NUMBER:  [id number]  AGE:  70 y.o. GENDER:  female  :  1955  TODAY'S DATE:  3/25/2019    Subjective:   Wound Identification: see below  Wound Type: pressure  Contributing Factors: cachectic, bedridden, paraplegic 11 yrs. , severe/constant pain    Patient Goal of Care:  [] Wound Healing  [] Odor Control  [x] Palliative Care - Call from [facility], [facility] stating per Dr. Michelle Araujo, pt. Not expected to heal serious pressure injuries and visits to clinic difficult and painful and not really fruitful for pt. Suggests palliative care for appropriate discharge for chronic and pain management care. [x] Pain Control   [] Other:     Objective:    BP 94/66   Pulse 72   Temp 98.4 °F (36.9 °C)   Resp 24   Ht 5' 4.17\" (1.63 m)   Wt 97 lb 10.6 oz (44.3 kg)   SpO2 92%   BMI 16.67 kg/m²   Tha Risk Score: Tha Scale Score: 14  Assessment:   Measurements:  Wound 19 Back Lower stage 4 pressure injury  (Active)   Wound Image   3/8/2019  9:04 AM   Wound Pressure Stage  4 3/25/2019  4:29 PM   Dressing Status Clean;Dry; Intact 3/25/2019  4:29 PM   Dressing Changed Changed/New 3/25/2019  3:00 PM   Dressing/Treatment Other (comment) 3/25/2019  4:29 PM   Wound Cleansed Rinsed/Irrigated with saline 3/25/2019  3:00 PM   Dressing Change Due 03/25/19 3/25/2019 10:30 AM   Wound Length (cm) 3 cm 3/25/2019  3:00 PM   Wound Width (cm) 2 cm 3/25/2019  3:00 PM   Wound Depth (cm) 2 cm 3/25/2019  3:00 PM   Wound Surface Area (cm^2) 6 cm^2 3/25/2019  3:00 PM   Change in Wound Size % (l*w) 12.28 3/25/2019  3:00 PM   Wound Volume (cm^3) 12 cm^3 3/25/2019  3:00 PM   Wound Healing % -35 3/25/2019  3:00 PM   Post-Procedure Length (cm) 3.5 cm 3/22/2019  9:16 AM   Post-Procedure Width (cm) 2 cm 3/22/2019  9:16 AM   Post-Procedure Depth (cm) 1 cm 3/22/2019  9:16 AM   Post-Procedure Surface Area (cm^2) 7 cm^2 3/22/2019  9:16 AM   Post-Procedure Volume (cm^3) 7 cm^3 3/22/2019  9:16 AM   Distance Tunneling (cm) 0 cm 3/8/2019  9:04 AM   Tunneling Position ___ O'Clock 0 3/15/2019  8:20 AM   Undermining Starts ___ O'Clock 6:00 3/22/2019  8:42 AM   Undermining Ends___ O'Clock 1:00 3/22/2019  8:42 AM   Undermining Maxium Distance (cm) 1 @ 9:00 3/22/2019  8:42 AM   Wound Assessment Painful;Purple;Yellow 3/25/2019  4:29 PM   Drainage Amount Small 3/25/2019  4:29 PM   Drainage Description Purulent 3/25/2019  4:29 PM   Odor Mild 3/25/2019  4:29 PM   Margins Epibole (rolled edges); Unattached edges 3/25/2019  4:29 PM   Exposed structure Muscle 3/25/2019  3:00 PM   Laura-wound Assessment Maceration; Red 3/25/2019  4:29 PM   Non-staged Wound Description Full thickness 3/22/2019  8:42 AM   [place]%Wound Bed 80 3/22/2019  8:42 AM   Yellow%Wound Bed 20 3/22/2019  8:42 AM   Number of days: 17       Wound 03/08/19 Coccyx #2- pressure stage 4 (Active)   Wound Image   3/8/2019  9:06 AM   Wound Pressure Stage  4 3/25/2019  4:29 PM   Dressing Status Clean;Dry; Intact 3/25/2019  4:29 PM   Dressing Changed Changed/New 3/25/2019  3:00 PM   Dressing/Treatment Other (comment) 3/25/2019  4:29 PM   Wound Cleansed Rinsed/Irrigated with saline 3/25/2019  3:00 PM   Dressing Change Due 03/24/19 3/23/2019 10:30 AM   Wound Length (cm) 5 cm 3/25/2019  3:00 PM   Wound Width (cm) 4 cm 3/25/2019  3:00 PM   Wound Depth (cm) 0.3 cm 3/25/2019  3:00 PM   Wound Surface Area (cm^2) 20 cm^2 3/25/2019  3:00 PM   Change in Wound Size % (l*w) -14.29 3/25/2019  3:00 PM   Wound Volume (cm^3) 6 cm^3 3/25/2019  3:00 PM   Wound Healing % 66 3/25/2019  3:00 PM   Post-Procedure Length (cm) 4.5 cm 3/22/2019  9:16 AM   Post-Procedure Width (cm) 4 cm 3/22/2019  9:16 AM   Post-Procedure Depth (cm) 0.8 cm 3/22/2019  9:16 AM   Post-Procedure Surface Area (cm^2) 18 cm^2 3/22/2019  9:16 AM   Post-Procedure Volume (cm^3) 14.4 cm^3 3/22/2019  9:16 AM   Undermining Starts ___ O'Clock 9 3/25/2019  3:00 PM   Undermining Ends___ O'Clock 3 3/25/2019  3:00 PM   Undermining Maxium Distance (cm) 3 3/25/2019  3:00 PM   Wound Assessment Yellow;Red;Painful 3/25/2019  4:29 PM   Drainage Amount Large 3/25/2019  3:00 PM   Drainage Description Purulent 3/25/2019  3:00 PM   Odor Mild 3/25/2019  4:29 PM   Margins Unattached edges 3/25/2019  3:00 PM   Exposed structure Muscle 3/25/2019  3:00 PM   Laura-wound Assessment Dry;Fragile; Excoriated;Red 3/25/2019  4:29 PM   Non-staged Wound Description Full thickness 3/22/2019  8:42 AM   [place]%Wound Bed 50 3/22/2019  8:42 AM   Red%Wound Bed 10 3/22/2019  8:42 AM   Yellow%Wound Bed 40 3/22/2019  8:42 AM   Number of days: 17       Wound 03/08/19 Hip Left #3- pressure stage 3 (Active)   Wound Image   3/8/2019  9:08 AM   Wound Pressure Stage  4 3/25/2019  4:29 PM   Dressing Status Intact; New drainage; Old drainage 3/25/2019  4:29 PM   Dressing Changed Changed/New 3/25/2019  3:00 PM   Dressing/Treatment Other (comment); Alginate with Ag; Foam 3/25/2019  4:29 PM   Wound Cleansed Rinsed/Irrigated with saline 3/25/2019  3:00 PM   Dressing Change Due 03/25/19 3/25/2019  4:29 PM   Wound Length (cm) 1 cm 3/25/2019  3:00 PM   Wound Width (cm) 1 cm 3/25/2019  3:00 PM   Wound Depth (cm) 2 cm 3/25/2019  3:00 PM   Wound Surface Area (cm^2) 1 cm^2 3/25/2019  3:00 PM   Change in Wound Size % (l*w) -58.73 3/25/2019  3:00 PM   Wound Volume (cm^3) 2 cm^3 3/25/2019  3:00 PM   Wound Healing % -300 3/25/2019  3:00 PM   Post-Procedure Length (cm) 1 cm 3/22/2019  9:16 AM   Post-Procedure Width (cm) 0.6 cm 3/22/2019  9:16 AM   Post-Procedure Depth (cm) 1 cm 3/22/2019  9:16 AM   Post-Procedure Surface Area (cm^2) 0.6 cm^2 3/22/2019  9:16 AM   Post-Procedure Volume (cm^3) 0.6 cm^3 3/22/2019  9:16 AM   Undermining Starts ___ O'Clock 7 3/25/2019  3:00 PM   Undermining Ends___ O'Clock 9 3/25/2019  3:00 PM   Undermining Maxium Distance (cm) 3 3/25/2019  3:00 PM   Wound Assessment Drainage;Painful;Red;Yellow 3/25/2019  4:29 PM   Drainage Amount Copious 3/25/2019  3:00 PM   Drainage Description Foul purulent 3/25/2019  3:00 PM   Odor Mild 3/25/2019  4:29 PM   Margins Epibole (rolled edges) 3/25/2019  1:31 PM   Exposed structure Muscle 3/25/2019  3:00 PM   Laura-wound Assessment Maceration;Yellow-brown;Hyperpigmented;Pink 3/25/2019  3:00 PM   Non-staged Wound Description Full thickness 3/22/2019  8:30 AM   [place]%Wound Bed 50 3/22/2019  8:30 AM   Yellow%Wound Bed 20 3/15/2019  8:20 AM   Black%Wound Bed 50 3/22/2019  8:30 AM   Number of days: 17       Wound 03/08/19 Heel Left;Posterior healed former pressure injury with dry scabs, skin over calcaneous/hard (Active)   Wound Image   3/8/2019  9:02 AM   Wound Other 3/25/2019  4:29 PM   Dressing Status Intact 3/25/2019  4:29 PM   Dressing Changed Changed/New 3/25/2019  4:29 PM   Dressing/Treatment Other (comment) 3/25/2019  4:29 PM   Wound Cleansed Rinsed/Irrigated with saline 3/24/2019  4:35 PM   Dressing Change Due 03/25/19 3/25/2019  4:29 PM   Wound Length (cm) 0.5 cm 3/22/2019  8:42 AM   Wound Width (cm) 0.5 cm 3/22/2019  8:42 AM   Wound Depth (cm) 0.1 cm 3/22/2019  8:42 AM   Wound Surface Area (cm^2) 0.25 cm^2 3/22/2019  8:42 AM   Change in Wound Size % (l*w) 50 3/22/2019  8:42 AM   Wound Volume (cm^3) 0.02 cm^3 3/22/2019  8:42 AM   Wound Healing % 60 3/22/2019  8:42 AM   Post-Procedure Length (cm) 0.5 cm 3/22/2019  9:16 AM   Post-Procedure Width (cm) 0.5 cm 3/22/2019  9:16 AM   Post-Procedure Depth (cm) 0.1 cm 3/22/2019  9:16 AM   Post-Procedure Surface Area (cm^2) 0.25 cm^2 3/22/2019  9:16 AM   Post-Procedure Volume (cm^3) 0.02 cm^3 3/22/2019  9:16 AM   Distance Tunneling (cm) 0 cm 3/22/2019  8:42 AM   Undermining Maxium Distance (cm) 0 3/22/2019  8:42 AM   Wound Assessment Purple;Brown;Dark edges;Dry 3/25/2019  4:29 PM   Drainage Amount None 3/25/2019  4:29 PM   Drainage Description VASU 3/24/2019 11:30 AM   Odor None 3/25/2019  4:29 PM   Margins Attached edges 3/25/2019 4:29 PM   Laura-wound Assessment Dry;Yellow-brown 3/25/2019  4:29 PM   Non-staged Wound Description Not applicable 2/56/8352  9:64 AM   Red%Wound Bed 0 3/22/2019  8:42 AM   Yellow%Wound Bed 0 3/22/2019  8:42 AM   Number of days: 17       Wound 03/25/19 Back Proximal;Mid DTI (Active)   Wound Deep tissue/Injury 3/25/2019  3:00 PM   Dressing Status Old drainage 3/25/2019  3:00 PM   Dressing Changed Changed/New 3/25/2019  3:00 PM   Dressing/Treatment Alginate with Ag; Foam 3/25/2019  3:00 PM   Wound Length (cm) 4 cm 3/25/2019  3:00 PM   Wound Width (cm) 1 cm 3/25/2019  3:00 PM   Wound Depth (cm) 0.1 cm 3/25/2019  3:00 PM   Wound Surface Area (cm^2) 4 cm^2 3/25/2019  3:00 PM   Wound Volume (cm^3) 0.4 cm^3 3/25/2019  3:00 PM   Wound Assessment Purple;Maroon 3/25/2019  3:00 PM   Drainage Amount Scant 3/25/2019  3:00 PM   Drainage Description Serosanguinous 3/25/2019  3:00 PM   Odor None 3/25/2019  3:00 PM   Margins Undefined edges 3/25/2019  3:00 PM   Laura-wound Assessment Yellow-brown 3/25/2019  3:00 PM   Purple%Wound Bed 100 3/25/2019  3:00 PM   Number of days: 0       Wound 03/25/19 Ischium Right Unstageable pressure injury (Active)   Wound Pressure Unstageable 3/25/2019  3:00 PM   Dressing Status Old drainage 3/25/2019  3:00 PM   Dressing Changed Changed/New 3/25/2019  3:00 PM   Dressing/Treatment Alginate with Ag; Foam 3/25/2019  3:00 PM   Wound Cleansed Rinsed/Irrigated with saline 3/25/2019  3:00 PM   Wound Length (cm) 0.7 cm 3/25/2019  3:00 PM   Wound Width (cm) 0.7 cm 3/25/2019  3:00 PM   Wound Depth (cm) 0.1 cm 3/25/2019  3:00 PM   Wound Surface Area (cm^2) 0.49 cm^2 3/25/2019  3:00 PM   Wound Volume (cm^3) 0.05 cm^3 3/25/2019  3:00 PM   Wound Assessment Red 3/25/2019  3:00 PM   Drainage Amount Scant 3/25/2019  3:00 PM   Drainage Description Serosanguinous 3/25/2019  3:00 PM   Odor None 3/25/2019  3:00 PM   Margins Unattached edges 3/25/2019  3:00 PM   Laura-wound Assessment Yellow-brown;Hyperpigmented 3/25/2019 3:00 PM   Red%Wound Bed 100 3/25/2019  3:00 PM   Number of days: 0       Response to treatment:  \"I'm in pain, make it stop, all I want is for the pain to stop! \"    Pain Assessment:  Severity: 10 / 10  Quality of pain: severe  Wound Pain Timing/Severity:constant per pt., yells with pain when wound beds touched  Premedicated:none ordered currently  Plan:   Plan of Care: Wound 03/08/19 Back Lower stage 4 pressure injury -Dressing/Treatment: Other (comment)(mepilex and aquacel)  Wound 03/08/19 Coccyx #2- pressure stage 4-Dressing/Treatment: Other (comment)(mepilex and aquacel)  Wound 03/08/19 Hip Left #3- pressure stage 3-Dressing/Treatment: Other (comment), Alginate with Ag, Foam(mepilex and aquacel)  Wound 03/08/19 Heel Left;Posterior healed former pressure injury with dry scabs, skin over calcaneous/hard-Dressing/Treatment: Other (comment)(mepilex)  Wound 03/25/19 Back Proximal;Mid DTI-Dressing/Treatment: Alginate with Ag, Foam  Wound 03/25/19 Ischium Right Unstageable pressure injury-Dressing/Treatment: Alginate with Ag, Foam    Specialty Bed Required :yes, new order placed  [] Low Air Loss   [] Pressure Redistribution  [x] Fluid Immersion  [] Bariatric  [] Total Pressure Relief  [] Other:     Referrals:  []   [] [facility]  [] Supplies  [x] Other - recommend hospice  Patient/Caregiver Teaching: David Kelly RN and pt.   Level of patient/caregiver understanding able to:   [x] Indicates understanding       [x] Needs reinforcement  [] Unsuccessful      [x] Verbal Understanding  [] Demonstrated understanding       [] No evidence of learning  [] Refused teaching         [] N/A       Electronically signed by Nancy Tyson RN, Craig Hernández on 3/25/2019 at 5:24 PM

## 2019-03-25 NOTE — PROGRESS NOTES
-- 97 % --   03/24/19 1635 120/67 97.7 °F (36.5 °C) Oral 82 19 100 % --   03/24/19 1630 110/67 -- -- 79 23 99 % --   03/24/19 1625 121/60 -- -- 80 23 96 % --   03/24/19 1620 124/76 -- -- 82 27 91 % --   03/24/19 1615 97/62 -- -- 81 22 100 % --   03/24/19 1610 98/60 -- -- 80 25 100 % --   03/24/19 1605 126/62 -- -- 90 25 100 % --   03/24/19 1600 130/76 -- -- 83 29 92 % --   03/24/19 1555 (!) 145/79 -- -- 83 23 (!) 84 % --   03/24/19 1550 139/72 -- -- 81 28 96 % --   03/24/19 1545 112/70 -- -- 80 22 97 % --   03/24/19 1540 -- -- -- 90 26 -- --   03/24/19 1530 137/82 -- -- -- 30 -- --   03/24/19 1500 119/65 -- -- 85 18 97 % --   03/24/19 1430 107/63 -- -- 73 22 99 % --   03/24/19 1400 118/68 -- -- 76 20 99 % --   03/24/19 1330 117/67 -- -- 78 20 95 % --   03/24/19 1321 -- -- -- -- 16 92 % --   03/24/19 1300 (!) 94/55 -- -- 76 29 90 % --   03/24/19 1239 (!) 90/55 -- -- 69 22 91 % --   03/24/19 1230 (!) 99/56 -- -- 72 24 -- --   03/24/19 1200 (!) 95/58 -- -- 87 17 98 % --   03/24/19 1152 -- -- -- -- -- 100 % --   03/24/19 1130 (!) 93/53 98.4 °F (36.9 °C) Oral 75 27 100 % --   03/24/19 1123 (!) 92/52 -- -- 73 20 100 % --   03/24/19 1100 (!) 81/49 -- -- 66 21 100 % --   03/24/19 1056 (!) 80/49 -- -- 65 21 100 % --   03/24/19 1035 (!) 81/47 -- -- 75 21 99 % --   03/24/19 1030 (!) 78/44 -- -- 68 18 100 % --   03/24/19 1003 (!) 83/46 -- -- 79 30 96 % --   03/24/19 1000 (!) 77/45 -- -- 79 19 99 % --   03/24/19 0930 (!) 95/57 -- -- 99 23 96 % --   03/24/19 0900 115/69 -- -- 83 20 92 % --   03/24/19 0831 -- -- -- -- 20 96 % --   03/24/19 0830 123/70 98.4 °F (36.9 °C) Oral 91 23 95 % --   03/24/19 0800 (!) 123/58 -- -- 85 29 -- --   03/24/19 0730 123/61 -- -- 85 (!) 31 97 % --   03/24/19 0700 118/67 -- -- 87 25 94 % --   03/24/19 0630 125/64 -- -- 86 22 97 % --   03/24/19 0600 111/66 -- -- 89 18 -- --   03/24/19 0530 110/71 -- -- 97 24 -- --   03/24/19 0500 114/63 -- -- 85 18 -- --   03/24/19 0447 -- -- -- -- -- -- 99 lb 10.4 oz (45.2 kg)   19 0430 116/65 -- -- 90 22 -- --   19 0400 114/63 -- -- 90 18 100 % --   19 0330 (!) 103/59 -- -- 85 22 -- --   19 0300 106/61 -- -- 81 21 -- --   19 0230 107/63 -- -- 81 26 -- --   19 0200 (!) 103/59 -- -- 78 26 -- --   19 0130 (!) 94/59 -- -- 72 20 -- --   19 0100 (!) 94/55 -- -- 73 27 -- --   19 0030 106/60 -- -- 75 25 -- --   19 0000 114/65 -- -- 81 24 -- --   19 2330 109/66 98.4 °F (36.9 °C) Oral 83 18 -- --   19 2300 (!) 99/54 -- -- 97 17 -- --   19 2230 (!) 84/46 -- -- 86 21 -- --       Exam:    VITALS:  /74   Pulse 88   Temp 97.7 °F (36.5 °C) (Oral)   Resp 30   Ht 5' 4.17\" (1.63 m)   Wt 99 lb 10.4 oz (45.2 kg)   SpO2 100%   BMI 17.01 kg/m²   TEMPERATURE:  Current - Temp: 97.7 °F (36.5 °C); Max - Temp  Av.2 °F (36.8 °C)  Min: 97.7 °F (36.5 °C)  Max: 98.4 °F (36.9 °C)    NAD  General appearance: alert, appears stated age, cooperative . Appears malnurished  Head: Normocephalic, without obvious abnormality, atraumatic  Neck: supple, symmetrical, trachea midline and thyroid not enlarged, symmetric, no tenderness/mass/nodules  CVS:  RRR, Nl s1s2  Lungs rhonchi. No wheezing . Rhonchi   Abdomen: soft, non-tender; bowel sounds normal; no masses,  no organomegaly  AAOx3, No asterixis or encephalopathy  Extremities: No edema. Paraplegia     Lab Data:  Recent Labs     19  1221 19  0416   WBC 13.1* 8.2   HGB 13.9 11.2*   HCT 42.5 34.8*   MCV 94.8 95.5    216     Recent Labs     19  1221 19  1650 19  0416     --  142   K 3.7  --  3.7     --  108   CO2 27  --  23   PHOS  --  3.4  --    BUN 14  --  11   CREATININE <0.5*  --  <0.5*     Recent Labs     19  1221   AST 28   ALT 27   BILIDIR <0.2   BILITOT 0.4   ALKPHOS 241*     No results for input(s): LIPASE, AMYLASE in the last 72 hours.   Recent Labs     19  1221   PROT 8.0     No results for input(s): PTT in
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
## 2019-03-25 NOTE — PROGRESS NOTES
Hospitalist Progress Note    PCP: Vincent Gill MD    Date of Admission: 3/22/2019  Hospital Day: 3      Chief Complaint:  Nausea and vomiting, dysphagia, chronic nonhealing decubitus ulcers          Hospital Course: admitted for above complaints, then transferred to ICU for profound hypoxia and hypotension. S/p bronch 3/24 with clearance of mucus plugs      Subjective:   Patient seen and examined  S/p bronch, hypoxia improved  BP stable until around noon today    Ancillary notes reviewed    Medications:  Reviewed    Infusion Medications    sodium chloride      sodium chloride       Scheduled Medications    [START ON 3/26/2019] enoxaparin  30 mg Subcutaneous Daily    vancomycin  750 mg Intravenous Q12H    sodium chloride  500 mL Intravenous Once    ipratropium-albuterol  1 ampule Inhalation Q4H WA    budesonide  0.5 mg Nebulization BID    nicotine  1 patch Transdermal Daily    sodium chloride flush  10 mL Intravenous 2 times per day    famotidine  20 mg PEG Tube BID    FLUoxetine  40 mg Per G Tube BID    folic acid  1 mg PEG Tube Lunch    gabapentin  600 mg PEG Tube TID    polyethylene glycol  17 g Per G Tube Daily    QUEtiapine  600 mg PEG Tube Nightly    tiZANidine  4 mg PEG Tube BID WC    phenytoin  100 mg PEG Tube TID    piperacillin-tazobactam  3.375 g Intravenous Q8H     PRN Meds: oxyCODONE, baclofen, sodium chloride flush, ondansetron, magnesium hydroxide, promethazine      Intake/Output Summary (Last 24 hours) at 3/25/2019 1534  Last data filed at 3/25/2019 1331  Gross per 24 hour   Intake 2287.2 ml   Output 3000 ml   Net -712.8 ml       Exam:    BP (!) 89/58   Pulse 68   Temp 98.4 °F (36.9 °C)   Resp 17   Ht 5' 4.17\" (1.63 m)   Wt 97 lb 10.6 oz (44.3 kg)   SpO2 95%   BMI 16.67 kg/m²       General appearance: No apparent distress, appears stated age and cooperative. HEENT: Pupils equal, round, and reactive to light. Conjunctivae/corneas clear.   Neck: Supple, with full range of motion. No jugular venous distention. Trachea midline. Respiratory:  Bilateral rhonchi, normal respiratory effort  Cardiovascular: Regular rate and rhythm with normal S1/S2 without murmurs, rubs or gallops. Abdomen: Soft, non-tender, non-distended with normal bowel sounds. Musculoskeletal: No clubbing, cyanosis or edema bilaterally. Full range of motion without deformity. Skin: Skin color, texture, turgor normal.  No rashes or lesions. Neurologic:  Neurovascularly intact without any focal sensory/motor deficits. Cranial nerves: II-XII intact, grossly non-focal.  Psychiatric: Alert and oriented, thought content appropriate, normal insight  Capillary Refill: Brisk,< 3 seconds   Peripheral Pulses: +2 palpable, equal bilaterally       Labs:   Recent Labs     03/23/19  0416 03/25/19  0453   WBC 8.2 6.9   HGB 11.2* 11.5*   HCT 34.8* 37.2    232     Recent Labs     03/22/19  1650 03/23/19  0416 03/25/19  0528 03/25/19  1217 03/25/19  1447   NA  --  142 142  --   --    K  --  3.7 2.7* 4.9  --    CL  --  108 105  --   --    CO2  --  23 28  --   --    BUN  --  11 10  --   --    CREATININE  --  <0.5* <0.5*  --   --    CALCIUM  --  7.2* 7.4*  --   --    PHOS 3.4  --   --   --  1.9*     Recent Labs     03/25/19  0528   AST 11*   ALT 16   BILIDIR <0.2   BILITOT <0.2   ALKPHOS 147*     No results for input(s): INR in the last 72 hours. No results for input(s): [employer] in the last 72 hours. Urinalysis:      Lab Results   Component Value Date    NITRU POSITIVE 03/23/2019    WBCUA 3-5 03/23/2019    BACTERIA 1+ 03/23/2019    RBCUA 3-5 03/23/2019    BLOODU SMALL 03/23/2019    SPECGRAV 1.010 03/23/2019    GLUCOSEU Negative 03/23/2019    GLUCOSEU 100 09/30/2011    GLUCOSEU 100 09/30/2011       Radiology:  CT ABDOMEN PELVIS W IV CONTRAST Additional Contrast? None   Final Result      1. No CT findings for pulmonary thromboembolism on the current study. 2.  Bilateral upper lobe predominant emphysema. 3.  Collapse of the left lower lobe with scattered air bronchograms. 3.  Coronary artery calcification. CT ABDOMEN AND PELVIS WITH CONTRAST      HISTORY: Decubitus ulcers evaluate for possible osteomyelitis      TECHNIQUE: Thin section axial images obtained through the abdomen pelvis. 80 mL Isovue-370 given intravenously. Multiplanar reconstruction images received for interpretation. Individualized dose optimization technique was used in order to meet ALARA    standards for radiation dose reduction. In addition to vendor specific dose reduction algorithms, the dose reduction techniques vary based on the specific scanner utilized but frequently include automated exposure control, adjustment of the mA and/or kV    according to patient size, and use of iterative reconstruction technique. FINDINGS: The liver, spleen, pancreas, and adrenal glands unremarkable. Gallbladder is moderately distended without stones or wall thickening. There is prominence of the common bile duct which measures 11 mm in greatest dimension. This tapers near the    level of the ampulla. There is mild prominence of the pancreatic duct. Right kidney demonstrating uniform enhancement. Renal cortical cysts scattered throughout the right kidney measuring less than a centimeter in short axis. There is marked long-standing hydronephrosis within the left kidney with marked cortical    thinning. 10 mm calcification in the upper pole left kidney. Left ureter is normal in caliber. IVC filter noted. No mesenteric or retroperitoneal lymphadenopathy. No free fluid collection seen. Bowel loops are not opacified, demonstrating no findings for obstruction. No discrete bowel wall thickening or mass identified. Within the pelvis, bladder is minimally distended. Calcifications in the retroareolar aspect of the bladder posteriorly may reflect small bladder stones. Patient status post hysterectomy.   No pelvic lymphadenopathy seen. Decubitus ulcers overlie the lower sacrum. There is severe bony demineralization throughout the osseous structures. Mild thinning of the posterior cortex overlying the lower sacrum is noted. The possibility for moderately as well as would be difficult    to entirely excluded in this study however evaluation is limited. There is extensive multilevel degenerative changes in the spine with extensive multilevel degenerative spondylosis. Severe arthritic changes in the hips. Orthopedic randolph with proximal    fixation in the proximal right femur. IMPRESSION:      1. Soft tissue ulceration overlying the lower sacrum consistent with history of decubitus ulcers. Mild thinning of the underlying cortex in the sacrum at this level is noted and the possibly for mild or early osteomyelitis cannot be entirely excluded    in this study. Study is severely limited due to severe bony demineralization. Correlate clinically. 2.  Chronic severe left-sided hydronephrosis with severe cortical thinning likely secondary to chronic UPJ obstruction. 3.  Distended gallbladder with mild prominence of the common bile duct and pancreatic duct. Note discrete stones seen. Possibly for small obstructing ampullary lesion is not excluded. CTA PULMONARY W CONTRAST   Final Result      1. No CT findings for pulmonary thromboembolism on the current study. 2.  Bilateral upper lobe predominant emphysema. 3.  Collapse of the left lower lobe with scattered air bronchograms. 3.  Coronary artery calcification. CT ABDOMEN AND PELVIS WITH CONTRAST      HISTORY: Decubitus ulcers evaluate for possible osteomyelitis      TECHNIQUE: Thin section axial images obtained through the abdomen pelvis. 80 mL Isovue-370 given intravenously. Multiplanar reconstruction images received for interpretation.   Individualized dose optimization technique was used in order to meet ALARA    standards for radiation dose reduction. In addition to vendor specific dose reduction algorithms, the dose reduction techniques vary based on the specific scanner utilized but frequently include automated exposure control, adjustment of the mA and/or kV    according to patient size, and use of iterative reconstruction technique. FINDINGS: The liver, spleen, pancreas, and adrenal glands unremarkable. Gallbladder is moderately distended without stones or wall thickening. There is prominence of the common bile duct which measures 11 mm in greatest dimension. This tapers near the    level of the ampulla. There is mild prominence of the pancreatic duct. Right kidney demonstrating uniform enhancement. Renal cortical cysts scattered throughout the right kidney measuring less than a centimeter in short axis. There is marked long-standing hydronephrosis within the left kidney with marked cortical    thinning. 10 mm calcification in the upper pole left kidney. Left ureter is normal in caliber. IVC filter noted. No mesenteric or retroperitoneal lymphadenopathy. No free fluid collection seen. Bowel loops are not opacified, demonstrating no findings for obstruction. No discrete bowel wall thickening or mass identified. Within the pelvis, bladder is minimally distended. Calcifications in the retroareolar aspect of the bladder posteriorly may reflect small bladder stones. Patient status post hysterectomy. No pelvic lymphadenopathy seen. Decubitus ulcers overlie the lower sacrum. There is severe bony demineralization throughout the osseous structures. Mild thinning of the posterior cortex overlying the lower sacrum is noted. The possibility for moderately as well as would be difficult    to entirely excluded in this study however evaluation is limited. There is extensive multilevel degenerative changes in the spine with extensive multilevel degenerative spondylosis.   Severe arthritic changes in the hips. Orthopedic randolph with proximal    fixation in the proximal right femur. IMPRESSION:      1. Soft tissue ulceration overlying the lower sacrum consistent with history of decubitus ulcers. Mild thinning of the underlying cortex in the sacrum at this level is noted and the possibly for mild or early osteomyelitis cannot be entirely excluded    in this study. Study is severely limited due to severe bony demineralization. Correlate clinically. 2.  Chronic severe left-sided hydronephrosis with severe cortical thinning likely secondary to chronic UPJ obstruction. 3.  Distended gallbladder with mild prominence of the common bile duct and pancreatic duct. Note discrete stones seen. Possibly for small obstructing ampullary lesion is not excluded. XR CHEST PORTABLE   Final Result   Impression: Mild diffuse interstitial prominence present underlying interstitial lung disease or mild edema. US ABDOMEN LIMITED    (Results Pending)           Assessment/Plan:    Active Hospital Problems    Diagnosis    Hypoxia [R09.02]     Priority: High    Hypotension [I95.9]     Priority: High    Infection [B99.9]     Priority: High    Infected decubitus ulcer, unspecified pressure ulcer stage [L89.90, L08.9]    Dysphagia [R13.10]    History of anal cancer [Z85.048]    Malnutrition ([facility] [address] [E46]    S/P percutaneous endoscopic gastrostomy (PEG) tube placement (HCC) [Z93.1]    Paraplegia ([facility]) [G82.20]     Hypotension likely multifactorial insetting of possible infected decubitus ulcers, dysautonomia related to paraplegia, narcotic and muscle relaxant use etc. Check TSH and random cortisol - WNL  Fluids prn for sustained low BP    CT-PA negative for PE, did demonstrate collapse in left lower lobe  No obvious pneumonia or mass. S/p bronch, hypoxia improved after mucus plugs cleared  Echo with normal EF, reduced IVC collapsibility pointing towards possible fluid overload.    On empiric ABx: Vanc + zosyn    GI following for dysphagia, appreciate help.  Will likely need EGD once stable    On tube feeds    gen surg following for non healing decub ulcers  CT: osteomyelitis cannot be entirely excluded  On vanc + zosyn  ?need for bone biopsy    Continue home medications, hold parameters for narcotics      DVT Prophylaxis: lovenox  Diet: Dietary Nutrition Supplements: Protein Modular  DIET GENERAL;  Diet NPO, After Midnight  Dietary Nutrition Supplements: Wound Healing Oral Supplement  Diet Tube Feed Continuous/Cyclic w/ Diet  Code Status: DNR-CCA    PT/OT Eval Status: pt at baseline    Dispo - inpatient, ICU     Mart Collins MD  3:34 PM 3/25/2019 Declines
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
## 2019-03-25 NOTE — PROGRESS NOTES
GI Progress Note    Angelica Navas is a 70 y.o. female patient. Dysphagia  Acute respiratory failure  COPD  Weight loss  Malnutrition  paraplegia      Admit Date: 3/22/2019    Subjective:       Still with dysphagia .  Odynophagia   No abdominal pain    Appetite is not good  No melena or hematochezia          Scheduled Meds:   [START ON 3/26/2019] enoxaparin  30 mg Subcutaneous Daily    vancomycin  750 mg Intravenous Q12H    ipratropium-albuterol  1 ampule Inhalation Q4H WA    budesonide  0.5 mg Nebulization BID    nicotine  1 patch Transdermal Daily    sodium chloride flush  10 mL Intravenous 2 times per day    famotidine  20 mg PEG Tube BID    FLUoxetine  40 mg Per G Tube BID    folic acid  1 mg PEG Tube Lunch    gabapentin  600 mg PEG Tube TID    polyethylene glycol  17 g Per G Tube Daily    QUEtiapine  600 mg PEG Tube Nightly    tiZANidine  4 mg PEG Tube BID WC    phenytoin  100 mg PEG Tube TID    piperacillin-tazobactam  3.375 g Intravenous Q8H       Continuous Infusions:   sodium chloride 100 mL/hr at 03/25/19 1628       PRN Meds:  oxyCODONE, baclofen, sodium chloride flush, ondansetron, magnesium hydroxide, promethazine      Objective:       Patient Vitals for the past 24 hrs:   BP Temp Temp src Pulse Resp SpO2 Weight   03/25/19 1707 -- -- -- -- 24 92 % --   03/25/19 1600 94/66 -- -- 72 19 93 % --   03/25/19 1530 114/64 -- -- 72 19 93 % --   03/25/19 1500 (!) 89/58 -- -- 68 17 95 % --   03/25/19 1447 (!) 81/57 -- -- 73 16 93 % --   03/25/19 1431 (!) 79/49 -- -- 75 17 -- --   03/25/19 1400 (!) 73/50 -- -- 68 25 (!) 82 % --   03/25/19 1331 (!) 82/52 -- -- 69 20 96 % --   03/25/19 1300 (!) 79/49 -- -- 68 20 96 % --   03/25/19 1238 (!) 76/48 -- -- 67 18 96 % --   03/25/19 1222 -- -- -- -- 24 98 % --   03/25/19 1200 (!) 73/46 -- -- 66 25 97 % --   03/25/19 1123 (!) 71/41 -- -- 78 20 96 % --   03/25/19 1100 (!) 81/47 -- -- 73 23 95 % --   03/25/19 1000 95/60 -- -- 79 22 95 % --   03/25/19 0904 -- -- -- -- 17 98 % --   19 0900 104/76 -- -- 78 17 100 % --   19 0855 -- -- -- -- 20 94 % --   19 0840 -- -- -- -- -- 95 % --   19 0830 118/69 98.4 °F (36.9 °C) -- 80 23 97 % --   19 0800 112/72 -- -- 76 20 (!) 84 % --   19 0730 116/72 -- -- 82 22 93 % --   19 0700 115/66 -- -- 82 17 97 % --   19 0600 133/79 -- -- 89 23 98 % --   19 0500 104/66 -- -- 83 25 -- 97 lb 10.6 oz (44.3 kg)   19 0400 98/65 -- -- 83 22 -- --   19 0300 (!) 142/81 -- -- 95 24 98 % --   19 0200 106/63 -- -- 93 28 96 % --   19 0100 113/64 -- -- 83 21 -- --   19 0000 111/65 -- -- 80 19 -- --   19 2300 110/63 -- -- 80 22 -- --   19 2200 119/75 -- -- 91 27 94 % --   19 2100 122/73 98 °F (36.7 °C) Oral 89 25 95 % --   19 119/66 -- -- 89 21 98 % --   19 1930 114/74 -- -- 88 30 100 % --   19 1900 133/77 -- -- 95 24 -- --       Exam:    VITALS:  BP 94/66   Pulse 72   Temp 98.4 °F (36.9 °C)   Resp 24   Ht 5' 4.17\" (1.63 m)   Wt 97 lb 10.6 oz (44.3 kg)   SpO2 92%   BMI 16.67 kg/m²   TEMPERATURE:  Current - Temp: 98.4 °F (36.9 °C); Max - Temp  Av.2 °F (36.8 °C)  Min: 98 °F (36.7 °C)  Max: 98.4 °F (36.9 °C)    NAD  General appearance: alert, appears stated age, cooperative and no distress  Head: Normocephalic, without obvious abnormality, atraumatic  Neck: supple, symmetrical, trachea midline and thyroid not enlarged, symmetric, no tenderness/mass/nodules  CVS:  RRR, Nl s1s2  Lungs CTA Bilaterally, weak effort  Abdomen: soft, non-tender; bowel sounds normal; no masses,  no organomegaly. Peg   Extremities: No edema.     Lab Data:  Recent Labs     19  0416 19  0453   WBC 8.2 6.9   HGB 11.2* 11.5*   HCT 34.8* 37.2   MCV 95.5 100.3*    232     Recent Labs     19  0416 19  0528 19  1217 19  1447    142  --   --    K 3.7 2.7* 4.9  --     105  --   --    CO2 23 28  --   -- PHOS  --   --   --  1.9*   BUN 11 10  --   --    CREATININE <0.5* <0.5*  --   --      Recent Labs     03/25/19  0528   AST 11*   ALT 16   BILIDIR <0.2   BILITOT <0.2   ALKPHOS 147*     No results for input(s): LIPASE, AMYLASE in the last 72 hours. Recent Labs     03/25/19  0528   PROT 6.0*     No results for input(s): PTT in the last 72 hours. No results for input(s): OCCULTBLD in the last 72 hours. Radiology review:as in records    Assessment:       Principal Problem:    Hypoxia  Active Problems:    Infection    Paraplegia ([facility] [address]    S/P percutaneous endoscopic gastrostomy (PEG) tube placement ([facility] [address]    History of anal cancer    Infected decubitus ulcer, unspecified pressure ulcer stage    Malnutrition (HCC)    Dysphagia    Hypotension  Resolved Problems:    * No resolved hospital problems. *    Severe weight loss  Dysphagia. Concerning for esophageal cancer    Recommendations:       egd in am  Discussed with patient and answered all questions    Spoke with DR Villalpando .  OK for egd though with higher risk given poor respiratory status    Carlos A Ramos MD  3/25/2019  6:50 PM
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
## 2019-03-25 NOTE — PROGRESS NOTES
Tubefeeds held at this time due to ultrasound in afternoon. Pt ate some of breakfast ~25% of plate (mostly just the sausage) and 480ml of coffee and orange juice. RN will hold lunch until after the ultrasound this afternoon    BP low at this time.  500ml bolus started

## 2019-03-25 NOTE — PROCEDURES
PROCEDURE: diagnostic and therapeutic fiberoptic bronchoscopy with bilateral wash and removal of mucus plugs    Preprocedure diagnosis: LLL collapse  Post procedure diagnosis: LLL collapse with mucus plugs      DESCRIPTION OF PROCEDURE: Informed consent was obtained from the patient after explaining the risks and benefits. A time out was taken. ASA - IV  Mallampati - III  Anesthesia Start time: 2:45pm          End time: 3:00pm          Fentanyl  50mcg          Versed 6mg    Bronchoscope was inserted into the main airway via the mouth. Vocal cords were normal looking and mobile. Lidocaine was used topically to anesthetize the vocal cords and main airways. Trachea and bronchial trees were examined to the subsegmental level. There was no masses or bleeding. Mucosa was mildly inflamed. There was large mucus plugs mainly in the LLL bronchus. This was therapeutically aspirated   BAL was obtained from LLL bronchus and sent for appropriate testing     The patient tolerated the procedure well.   No immediate complication

## 2019-03-25 NOTE — PROGRESS NOTES
Occupational Therapy    Initial Evaluation/ Sign-off    Spoke with pt and  this morning for brief assessment. Pt and  both stated that pt is at her baseline in terms of mobility and self-care. Pt and  do not have any therapy-related concerns at this time, stating that their primary concerns are wound healing and nutrition. Evaluation:   - Pt spends most of her time in bed;  performs all I/ADLs;   - Pt occasionally gets up to w/c with totalA from  for transfers  - Pt uses overhead trapeze at home to assist with repositioning in bed  - UE AROM: WFL    Spoke to RN about getting pt trapeze for hospital bed to promote activity tolerance and wound healing during admission. Encouraged pt to perf OOB and self-care with staff to promote activity tolerance. Pt has no further inpt OT needs/concerns and proper supports in place at home, will sign-off.      Gavin Sensing S/OT     Therapy Time:   Individual Concurrent Group Co-treatment   Time In 2189         Time Out 1008         Minutes 18           Timed Code Treatment Minutes:  3    Total Treatment Minutes: 1545 Christiano Armenta OTR/L, [id number]

## 2019-03-25 NOTE — CONSULTS
Nutrition Assessment    Type and Reason for Visit: Reassess, Consult    Nutrition Recommendations:     Please note pt meets ASPEN criteria for severe malnutrition. This puts patient at risk for refeeding syndrome. · Monitor labs Phos/K+/Mg and replace PRN. Recommend checking phos lab  · Also consider initiating Thiamin + Folic Acid  + MVI    · Monitor and encourage PO intake on General diet  · Trial wound healing ONS: Tino BID  · Continue TF via PEG: Jevity 1.5@ 20 mL/hr and continue to monitor tolerate/ signs of refeeding   · Add 1 bottle protein modular \"Proteinex 2go\" daily via PEG tube       Nutrition Assessment: Pt remains at high nutritional risk 2/2 severe PCM, PEG for TF to meet nutritional needs. RN reports frequent interruptions to TF r/t testing and procedures. Noted patient is a high risk for refeeding syndrome 2/2 severe malnutrition. Noted K+/Mg low and replaced. Will order Phos lab to evaluate. Currently on hold this for test today. Pt did consume 1-25% of breakfast tray. Pt is at further nutritional risk r/t wounds. Recommend continue general diet, will order wound healing ONS \"Tino\" to be encouraged orally BID to aid in wound healing. Recommend continue continuous TF via PEG but to restart at trophic rate: Jevity 1.5 @ 20 mL/hr and hold 2/2 refeeding syndrome     Malnutrition Assessment:  · Malnutrition Status: Meets the criteria for severe malnutrition  · Context: Chronic illness  · Findings of the 6 clinical characteristics of malnutrition (Minimum of 2 out of 6 clinical characteristics is required to make the diagnosis of moderate or severe Protein Calorie Malnutrition based on AND/ASPEN Guidelines):  1. Energy Intake-Less than or equal to 75% of estimated energy requirement, Greater than or equal to 3 months    2. Weight Loss-(15%), (7 months)  3. Fat Loss-Severe subcutaneous fat loss, Orbital, Triceps, Fat overlying the ribs  4.  Muscle Loss-Severe muscle mass loss, Temples (temporalis muscle), Clavicles (pectoralis and deltoids), Calf (gastrocnemius)  5. Fluid Accumulation-No significant fluid accumulation,    6.   Strength-Not measured    Nutrition Risk Level: High    Nutrition Needs:  · Estimated Daily Total Kcal: 1579-6132 kcal   · Estimated Daily Protein (g): 67-90 grams  · Estimated Daily Fluid (ml/day): 1800 mL    Nutrition Diagnosis:   · Problem: Severe malnutrition  · Etiology: related to Insufficient energy/nutrient consumption     Signs and symptoms:  as evidenced by Diet history of poor intake, Presence of wounds, BMI, Severe loss of subcutaneous fat, Severe muscle loss    Objective Information:  · Nutrition-Focused Physical Findings: LBM 3/23; BLE nonpitting edema  · Wound Type: Multiple, Pressure Ulcer, Stage III, Stage IV  · Current Nutrition Therapies:  · Oral Diet Orders: General   · Oral Diet intake: 1-25%  · Oral Nutrition Supplement (ONS) Orders: None  · ONS intake: NPO  · Tube Feeding (TF) Orders:   · Feeding Route: Gastrostomy  · Formula: 1.5 Calorie with Fiber  · Rate (ml/hr):goal is 45mL/hr; currently on hold r/t testing    · Current TF & Flush Orders Provides: TF on hold   · Goal TF & Flush Orders Provides: Jevity 1.5 @ 45mL/hr+ 1protein modular daily providing 1080 mL TV formula, 1724 kcal, 95 grams protein, 821 mL free water; 150 mL Q 4hrs   · Anthropometric Measures:  · Ht: 5' 4.17\" (163 cm)   · Current Body Wt: 97 lb 10.6 oz (44.3 kg)  · Admission Body Wt: 98 lb 15.8 oz (44.9 kg)  · Weight Change: 15% loss ~7 months   · Ideal Body Wt: 120 lb (54.4 kg), % Ideal Body 82%  · BMI Classification: BMI <18.5 Underweight    Nutrition Interventions:   Continue current diet, Start ONS, Continue current Tube Feeding  Continued Inpatient Monitoring    Nutrition Evaluation:   · Evaluation: Progressing toward goals   · Goals: Patient will tolerate EN to meet 100% of nutrition needs   · Monitoring: Nutrition Progression, Meal Intake, Supplement Intake, TF Intake, Wound Healing      Electronically signed by Davy Gaming RD, ROSEMARY on 3/25/19 at 1:15 PM    Contact Number:   Pager: [phone]  Office: [phone]

## 2019-03-26 NOTE — CARE COORDINATION
Case Management Daily Note:    Current Plan of Care: EGD today      PT AM-PAC:  N/A    OT AM-PAC:  N/A    DME needs: N/A      Discharge Plan:  SNF    Tentative Discharge Date: TBD    Current Barriers to Discharge: placement    Resources/Information given: N/A      Case Management Notes:     CM received feedback from team meeting that patient will need a SNF. PT/OT evals were discontinued after patient and spouse report she is at baseline. She is mostly bed bound at home and cared for by spouse. CM unable to meet with patient and family today because she was off the unit at [facility] most of the shift. CM will follow up tomorrow.

## 2019-03-26 NOTE — OP NOTE
EGD:    Esophageal stricture  Two large ulcers at GEJ with severe esophagitis  Dilation not performed due to presence of ulcers  Will treat with protonix and carafate elixir   Full liquid diet  Elevate HOB 45 degree at all time  May start TF till egd is repeated in two weeks, and when ulcers heal will dilate then and hopefully advance diet     Thank you Dr Gerhardt Lore, m.d.  0-20-48
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
## 2019-03-26 NOTE — PROGRESS NOTES
Patient complains of abdominal cramping and nausea, she is not able to tolerate oral food and I stopped the tube feeding due to abdominal pain. Abdomen is soft and I gave her roxicodone and zofran but she is still uncomfortable. Perfect served resident and awaiting call back. Will continue to monitor.

## 2019-03-27 NOTE — PROGRESS NOTES
19 0800 130/75 98.4 °F (36.9 °C) Oral 97 15 96 % --   19 0625 121/70 -- -- 83 18 -- --   19 0500 -- -- -- 73 16 -- --   19 0400 114/70 97.7 °F (36.5 °C) Oral 71 20 -- --   19 0300 -- -- -- 71 19 97 % --   19 0205 101/84 97.7 °F (36.5 °C) Oral 107 22 95 % --   19 0100 101/77 -- -- 71 22 -- --   19 0000 102/79 -- -- 74 21 96 % --   19 2300 89/63 98.4 °F (36.9 °C) Oral 77 17 93 % 107 lb 2.3 oz (48.6 kg)   19 2200 101/71 -- -- 76 26 93 % --       Exam:    VITALS:  /72   Pulse 89   Temp 98 °F (36.7 °C) (Oral)   Resp 27   Ht 5' 4.17\" (1.63 m)   Wt 107 lb 2.3 oz (48.6 kg)   SpO2 96%   BMI 18.29 kg/m²   TEMPERATURE:  Current - Temp: 98 °F (36.7 °C); Max - Temp  Av °F (36.7 °C)  Min: 97.5 °F (36.4 °C)  Max: 98.4 °F (36.9 °C)    NAD  General appearance: alert, appears stated age, cooperative and no distress  Head: Normocephalic, without obvious abnormality, atraumatic  Neck: supple, symmetrical, trachea midline and thyroid not enlarged, symmetric, no tenderness/mass/nodules  CVS:  RRR, Nl s1s2  Lungs CTA Bilaterally, decreased BS  Abdomen: soft, non-tender; bowel sounds normal; no masses,  no organomegaly. Peg in good position  Extremities: No edema. Lab Data:  Recent Labs     19  0453 19  0510   WBC 6.9 6.6   HGB 11.5* 10.4*   HCT 37.2 32.2*   .3* 96.0    219     Recent Labs     19  0528 19  1217 19  1447 19  0510     --   --  137   K 2.7* 4.9  --  4.3     --   --  104   CO2 28  --   --  26   PHOS  --   --  1.9* 1.9*   BUN 10  --   --  20   CREATININE <0.5*  --   --  <0.5*     Recent Labs     19   AST 11*   ALT 16   BILIDIR <0.2   BILITOT <0.2   ALKPHOS 147*     No results for input(s): LIPASE, AMYLASE in the last 72 hours. Recent Labs     19   PROT 6.0*     No results for input(s): PTT in the last 72 hours.   No results for input(s): OCCULTBLD in the last 72 hours.        Assessment:       Principal Problem:    Hypoxia  Active Problems:    Infection    Paraplegia (HCC)    S/P percutaneous endoscopic gastrostomy (PEG) tube placement (HCC)    History of anal cancer    Infected decubitus ulcer, unspecified pressure ulcer stage    Malnutrition (HCC)    Dysphagia    Hypotension  Resolved Problems:    * No resolved hospital problems. *    Cardiopulmonary status was assessed . Stabilizing       Recommendations:       EGD today.  Higher procedure risk was discussed  with patient and     Very important to do an EGD to R/O esophageal cancer    They understand and consent to procedure    Jatinder Bess MD  3/26/2019   [facility] [address]
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
## 2019-03-27 NOTE — PROGRESS NOTES
RESPIRATORY THERAPY ASSESSMENT    Name:  [address] [place] [address] Record Number:  [id number]  Age: 70 y.o. Gender: female  : 1955  Today's Date:  3/27/2019  Room:  [facility]-    Assessment     Is the patient being admitted for a COPD or Asthma exacerbation? NA   (If yes the patient will be seen every 4 hours for the first 24 hours and then reassessed)    Patient Admission Diagnosis      Allergies  No Known Allergies      Pulmonary History:COPD and Current smoker  Home Oxygen Therapy:  room air  Home Respiratory Therapy:Albuterol   Current Respiratory Therapy:  DuoNeb HHN QID; Pulmicort HHN BID  Treatment Type: HHN, Vibratory Mucous Clearing Therapy or Intervention  Medications: Albuterol/Ipratropium    Respiratory Severity Index(RSI)   Patients with orders for inhalation medications, oxygen, or any therapeutic treatment modality will be placed on Respiratory Protocol. They will be assessed with the first treatment and at least every 72 hours thereafter. The following severity scale will be used to determine frequency of treatment intervention.     Smoking History: Pulmonary Disease or Smoking History, Greater than 15 pack year = 2    Social History  Social History     Tobacco Use    Smoking status: Current Every Day Smoker     Packs/day: 2.00     Years: 40.00     Pack years: 80.00     Types: Cigarettes    Smokeless tobacco: Never Used    Tobacco comment:  on cessation- 2016   Substance Use Topics    Alcohol use: No     Alcohol/week: 0.0 oz    Drug use: No       Recent Surgical History: None = 0  Past Surgical History  Past Surgical History:   Procedure Laterality Date    BACK SURGERY      x3 surgeries lumbar & 1 to cervical spine     BLADDER REPAIR      ruptured bladder after fell off of horse    CAROTID ENDARTERECTOMY      right    DEBRIDEMENT  10/1/2011    left groin and left hip debridement    FRACTURE SURGERY      karon. pelvic fx 1993, Rt femur fx repair 11    GASTROSTOMY TUBE PLACEMENT      HERNIA REPAIR      x2    LEG DEBRIDEMENT  4/21/11    non healing wounds left posterior leg    MASTECTOMY      bilateral    SKIN CANCER EXCISION  8/18/11    invasive SCC left groin    TONSILLECTOMY      UPPER GASTROINTESTINAL ENDOSCOPY N/A 3/26/2019    EGD BIOPSY GASTRIC OF GASTRITIS FOR H PYLORI performed by Diogo Hernandez MD at [address]         Level of Consciousness: Alert, Oriented, and Cooperative = 0    Level of Activity: Non weight bearing- transfers bed to chair only = 3    Respiratory Pattern: Regular Pattern; RR 8-20 = 0    Breath Sounds: Diminshed bilaterally and/or crackles = 2    Sputum   ,  , Sputum How Obtained: Cough on request  Cough: Weak, non-productive = 3    Vital Signs   BP (!) 144/127   Pulse 80   Temp 98.5 °F (36.9 °C) (Oral)   Resp 24   Ht 5' 4.17\" (1.63 m)   Wt 103 lb 9.9 oz (47 kg)   SpO2 96%   BMI 17.69 kg/m²   SPO2 (COPD values may differ): 90-91% on room air or greater than 92% on FiO2 24- 28% = 1    Peak Flow (asthma only): not applicable = 0    RSI: 54-49 = Q6H or QID and Q4HPRN for dyspnea        Plan       Goals: medication delivery, volume expansion and improve oxygenation    Patient/caregiver was educated on the proper method of use for Respiratory Care Devices:  Yes      Level of patient/caregiver understanding able to:   ? Verbalize understanding   ? Demonstrate understanding       ? Teach back        ? Needs reinforcement       ? No available caregiver               ? Other:     Response to education:  Excellent     Is patient being placed on Home Treatment Regimen? No     Comments: Chart reviewed    Plan of Care: Continue DuoNeb HHN QID    Electronically signed by Nikole Sheldon RCP on 3/27/2019 at 6:09 PM    Respiratory Protocol Guidelines     1.  Assessment and treatment by Respiratory Therapy will be initiated for medication and therapeutic interventions upon initiation of aerosolized medication. 2. Physician will be contacted for respiratory rate (RR) greater than 35 breaths per minute. Therapy will be held for heart rate (HR) greater than 140 beats per minute, pending direction from physician. 3. Bronchodilators will be administered via Metered Dose Inhaler (MDI) with spacer when the following criteria are met:  a. Alert and cooperative     b. HR < 140 bpm  c. RR < 30 bpm                d. Can demonstrate a 2-3 second inspiratory hold  4. Bronchodilators will be administered via Hand Held Nebulizer DARYL [facility]) to patients when ANY of the following criteria are met  a. Incognizant or uncooperative          b. Patients treated with HHN at Home        c. Unable to demonstrate proper use of MDI with spacer     d. RR > 30 bpm   5. Bronchodilators will be delivered via Metered Dose Inhaler (MDI), HHN, Aerogen to intubated patients on mechanical ventilation. 6. Inhalation medication orders will be delivered and/or substituted as outlined below. Aerosolized Medications Ordering and Administration Guidelines:    1. All Medications will be ordered by a physician, and their frequency and/or modality will be adjusted as defined by the patients Respiratory Severity Index (RSI) score. 2. If the patient does not have documented COPD, consider discontinuing anticholinergics when RSI is less than 9.  3. If the bronchospasm worsens (increased RSI), then the bronchodilator frequency can be increased to a maximum of every 4 hours. If greater than every 4 hours is required, the physician will be contacted. 4. If the bronchospasm improves, the frequency of the bronchodilator can be decreased, based on the patient's RSI, but not less than home treatment regimen frequency. 5. Bronchodilator(s) will be discontinued if patient has a RSI less than 9 and has received no scheduled or as needed treatment for 72  Hrs. Patients Ordered on a Mucolytic Agent:    1.  Must always be administered with a bronchodilator. 2. Discontinue if patient experiences worsened bronchospasm, or secretions have lessened to the point that the patient is able to clear them with a cough. Anti-inflammatory and Combination Medications:    1. If the patient lacks prior history of lung disease, is not using inhaled anti-inflammatory medication at home, and lacks wheezing by examination or by history for at least 24 hours, contact physician for possible discontinuation.
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
## 2019-03-27 NOTE — OP NOTE
[address], [address]                                OPERATIVE REPORT    PATIENT NAME: Marisabel Mehta                  :        1955  MED REC NO:   [id number]                          ROOM:       [id number]  ACCOUNT NO:   [de-identified]                           ADMIT DATE: 2019  PROVIDER:     Carlos A Ramos MD    DATE OF PROCEDURE:  2019    SURGEON:  Carlos A Ramos MD    INDICATION FOR PROCEDURE:  1. Dysphagia. 2.  Weight loss. 3.  Longstanding history of gastroesophageal reflux. PROCEDURE:  With the patient in the left lateral position, and after IV  Diprivan, the Olympus video endoscope was introduced into the esophagus  and advanced toward the GE junction where an esophageal stricture and  esophagitis noted with particularly 2 large ulcers noted near the GE  junction. Biopsies were obtained. More biopsies were obtained from the  mid esophagus to rule out eosinophilic esophagitis. Due to the presence  of ulcers at the GE junction, we elected not to perform dilation to  avoid complication. The stomach was carefully inspected and it was  unremarkable and biopsies were obtained for Helicobacter pylori. The  duodenum was normal.  Scope was then removed without complications. The  mushroom portion of the PEG tube was noted and appeared to be in good  position. IMPRESSION:  1. Esophageal stricture. 2.  Distal esophageal/GE junction large ulcers. 3.  Esophagitis. 4.  Status post PEG. PLAN:  The patient will need to be treated with high-dose PPI and repeat  her EGD within the next two/four weeks. When ulcer healed, dilation can  be performed. In the meantime, she should be on a full-liquid diet and  her nutrition can be supplemented through the tube feeding.         Hieu Browne MD    D: 2019 14:53:02       T: 2019 21:42:12     [email or web address]  Job#: [id number]     Doc#: [id number]    CC:

## 2019-03-27 NOTE — PROGRESS NOTES
Perfect served resident r/g patients labs. New orders for oral K. Patient states he stomach is feeling a lot better, she is drinking a pepsi and orange juice.   Tube feed restarted at 20ml/hr

## 2019-03-28 NOTE — PLAN OF CARE
Multiple wounds on her body surfaces due to being bed bound at home, they are covered and being treated by the wound care nurse. Patient tends to lay  on her right side, turned her to her left side with encouragement from her . Did complain of pain all over, gave her roxicodone through her Peg tube and restarted her tube feeds. Will continue to monitor.
Nutrition Problem: Severe malnutrition  Intervention: Food and/or Nutrient Delivery: Continue NPO, Start Tube Feeding  Nutritional Goals: Patient will tolerate EN to meet 100% of nutrition needs
Nutrition Problem: Severe malnutrition  Intervention: Food and/or Nutrient Delivery: Continue current diet, Modify current Tube Feeding  Nutritional Goals: Patient will tolerate EN to meet 100% of nutrition needs
Patient denies pain at present. Will continue to assess per policy. Pt at risk for skin breakdown. See Tha score. Pt remains on bedrest. Unable to reposition self in bed. Heels elevated off bed. Sacral heart mepilex intact to protect,  site inspected and intact underneath. Will continue to turn and reposition patient every two hours and as needed. Will continue to keep patient clean and dry, applying skin care cream as needed. Pillows used for repositioning q2hs. Will continue to monitor and assess for skin breakdown. Patient has multiple other skin sites that are being monitored by wound therapy, they are covered. Patient is at high risk for falls. See HIDALGO score. Bed in lowest position, side rails up x 4, bed alarm on. Patient remains free from injury. Will continue fall prevention strategies.
Patient is at high risk for falls. See HIDALGO score. Bed in lowest position, side rails up x 4, bed alarm on. Patient remains free from injury. Will continue fall prevention strategies. Pt at risk for skin breakdown. See Tha score. Pt remains on bedrest. Unable to reposition self in bed. Heels elevated off bed. Sacral heart mepilex intact to protect,  site inspected and intact underneath. Other pressure areas wounds covered, bandages intact. Will continue to turn and reposition patient every two hours and as needed. Will continue to keep patient clean and dry, applying skin care cream as needed. Pillows used for repositioning q2hs. Will continue to monitor and assess for skin breakdown.
Problem: Nutrition  Goal: Optimal nutrition therapy  Outcome: Ongoing   Nutrition Problem: Severe malnutrition  Intervention: Food and/or Nutrient Delivery: Continue current diet, Start ONS, Continue current Tube Feeding  Nutritional Goals: Patient will tolerate EN to meet 100% of nutrition needs
Problem: Pain:  Goal: Control of chronic pain  Description  Control of chronic pain  Outcome: Ongoing  Note:   Pain assessed q4hr and PRN using the 0-10 pain scale. Scheduled pain medication given. Repositioned patient q2hr and as needed. Instructed patient to alert RN for symptoms of pain. Problem: Falls - Risk of:  Goal: Will remain free from falls  Description  Will remain free from falls  Outcome: Met This Shift  Note:   Bed alarm utilized this shift for pt safety. Pt reoriented to surroundings and bed kept in lowest position with wheel locked and side rails up 3/4. Call light, belongings and bedside table kept within reach. Room door left open. Problem: Risk for Impaired Skin Integrity  Goal: Tissue integrity - skin and mucous membranes  Description  Structural intactness and normal physiological function of skin and  mucous membranes. Outcome: Met This Shift  Note:   Pt is of increased risk of skin breakdown (and was admitted with multiple pre-existing pressure ulcers). Preventative measures in place. Head to toe skin assessment completed qshift. Medical equipment kept off of skin as much as possible and moisture controlled. Pt turned q2hr and PRN. Low air loss and alternating pressure relief mattress in use. Sacral heart in place. Problem: Nutrition  Goal: Optimal nutrition therapy  3/23/2019 0641 by Ric Irizarry RN  Outcome: Met This Shift  Note:   Pt started on continuous tube feeds through PEG tube this shift. Problem: Gas Exchange - Impaired:  Goal: Levels of oxygenation will improve  Description  Levels of oxygenation will improve  Outcome: Ongoing  Note:   Pt unable to effectively clear secretions with coughing and ET suction was ineffective. Lung sounds coarse. SpO2 kept WNL with nonrebreather mask.
Problem: Pain:  Goal: Pain level will decrease  Description  Pain level will decrease  Note:   Pt complaining of pain rating pain 7/10 most of the night. Pt receiving q4h oxycodone per PEG tube. Pt tolerating well. Some drop in blood pressure that is asymptomatic. Pt has been sleeping on and of after receiving pain medicine. Will cont to monitor. Problem: Risk for Impaired Skin Integrity  Goal: Tissue integrity - skin and mucous membranes  Description  Structural intactness and normal physiological function of skin and  mucous membranes. Note:   Pt at risk for skin break down d/t decreased mobility and pressure. Pt being instructed to turn self or assisted to turn q2h. Pillow support used during turning. Sacral heart in place. Skin kept clean and dry. Pt has previous pressure wounds that have dressings in place. Dressings being changed daily. Pt complaining of pain when doing skin care and skin assessment. Will cont to monitor.
Pt placed in specialty bed, NPO for EGD tomorrow
after Purewick external catheter continued to leak onto sacral wound. Heels elevated off of bed. Turning and repositioning every 2 hours as patient allows with pillow support. Will continue to monitor and collaborate with surgical team/ Danie Eaton.       Problem: Tissue Perfusion, Altered:  Goal: Circulatory function within specified parameters  Description  Circulatory function within specified parameters  Outcome: Ongoing

## 2019-03-28 NOTE — PROGRESS NOTES
Nutrition Assessment     Type and Reason for Visit: Reassess    Nutrition Recommendations:     **Patient currently tolerating EN trophic feeds. Recommend advancing enteral nutrition to goal rate to provide 100% of patients needs as patient meets ASPEN criteria for severe protein calorie malnutrition and has increased nutrient needs related to wounds. ENTERAL NUTRITION  1. Recommend advance Jevity 1.5 formula by 10 ml Q4 hrs to goal rate of 45 ml/hr. 2. Suggest 150 mL H20 q 4 hours for 100% fluid recommendations when no IVF. 3. Recommend adding 1 bottle Proteinex P2Go protein supplement. Flush tube w/ 30-60 mL H20 before and after administration. 4. Continue to closely monitor and correct lytes (K, Phos, Mg) before progressing TF to goal d/t risk of refeeding syndrome (hx extended inadequate nutritional intake). 5. Monitor for tolerance (bowel habits, N/V, cramping, abdominal distention). Nutrition Assessment: Patient continues to be nutritionally compromised due to dx of severe PCM and minimal po intake on full liquid diet. Pt to continue on strict liquid diet 2-3 weeks while esophageal ulcer heals with plans to place stent. Patient tolerating trophic feeding and electrolytes continue to be replaced. Will recommend advancing EN to goal today. Patient with additional risk r/t increased nutrient needs from multiple wounds. Will continue to monitor nutritional status. Malnutrition Assessment:  · Malnutrition Status: Meets the criteria for severe malnutrition  · Context: Chronic illness  · Findings of the 6 clinical characteristics of malnutrition (Minimum of 2 out of 6 clinical characteristics is required to make the diagnosis of moderate or severe Protein Calorie Malnutrition based on AND/ASPEN Guidelines):  1. Energy Intake-Less than or equal to 75% of estimated energy requirement, Greater than or equal to 3 months    2. Weight Loss-(15%), (7 months)  3.  Fat Loss-Severe subcutaneous fat loss, Orbital, Triceps, Fat overlying the ribs  4. Muscle Loss-Severe muscle mass loss, Temples (temporalis muscle), Clavicles (pectoralis and deltoids), Calf (gastrocnemius)  5. Fluid Accumulation-No significant fluid accumulation,    6.   Strength-Not measured    Nutrition Risk Level: High    Nutrition Needs:  · Estimated Daily Total Kcal: 3611-8694 kcal   · Estimated Daily Protein (g): 67-90 grams  · Estimated Daily Fluid (ml/day): 1800 mL    Nutrition Diagnosis:   · Problem: Severe malnutrition  · Etiology: related to Insufficient energy/nutrient consumption     Signs and symptoms:  as evidenced by Diet history of poor intake, Presence of wounds, BMI, Severe loss of subcutaneous fat, Severe muscle loss    Objective Information:  · Nutrition-Focused Physical Findings: LBM 3/25  · Wound Type: Multiple, Pressure Ulcer, Stage III, Stage IV  · Current Nutrition Therapies:  · Oral Diet Orders: Full Liquid   · Oral Diet intake: 1-25%, 26-50%  · Oral Nutrition Supplement (ONS) Orders: Protein Modular  · ONS intake: NPO  · Tube Feeding (TF) Orders:   · Feeding Route: Gastrostomy  · Formula: 1.5 Calorie with Fiber  · Rate (ml/hr):20 ml/hr    · Volume (ml/day): 480 ml  · Duration: Continuous  · Additives/Modulars: Protein  · Water Flushes: 150 ml Q4 hrs  · Current TF & Flush Orders Provides: Jevity 1.5 at 20 ml/hr plus one proteinex daily provides 680 kcal, 56 gm protein and 364 ml free water  · Goal TF & Flush Orders Provides: RD Goal: Jevity 1.5 at 45 ml/hr plus one proteinex daily will provide 1080 ml TV, 1724 kcal, 95 gm protein and 821 ml free water  · Anthropometric Measures:  · Ht: 5' 4.17\" (163 cm)   · Current Body Wt: 104 lb (47.2 kg)  · Admission Body Wt: 98 lb 15.8 oz (44.9 kg)  · Weight Change: 15% loss ~7 months   · Ideal Body Wt: 120 lb (54.4 kg), % Ideal Body 82%  · BMI Classification: BMI <18.5 Underweight    Nutrition Interventions:   Continue current diet, Modify current Tube Feeding  Continued Inpatient Monitoring    Nutrition Evaluation:   · Evaluation: Progressing toward goals   · Goals: Patient will tolerate EN to meet 100% of nutrition needs   · Monitoring: Nutrition Progression, Meal Intake, Diet Tolerance, TF Tolerance, Weight, Pertinent Labs, Monitor Bowel Function      Electronically signed by Garrett Johnson RD, LD on 3/28/19 at 11:02 AM    Contact Number: [phone]

## 2019-03-28 NOTE — PROGRESS NOTES
Results for Diana Hurst (MRN [id number]) as of 3/28/2019 04:06   Ref. Range 3/28/2019 03:20   Sodium Latest Ref Range: 136 - 145 mmol/L 141   Potassium Latest Ref Range: 3.5 - 5.1 mmol/L 3.3 (L)   Chloride Latest Ref Range: 99 - 110 mmol/L 106   CO2 Latest Ref Range: 21 - 32 mmol/L 26   BUN Latest Ref Range: 7 - 20 mg/dL 10   Creatinine Latest Ref Range: 0.6 - 1.2 mg/dL <0.5 (L)   Anion Gap Latest Ref Range: 3 - 16  9   GFR Non- Latest Ref Range: >60  >60   GFR  Latest Ref Range: >60  >60   Magnesium Latest Ref Range: 1.80 - 2.40 mg/dL 1.70 (L)   Glucose Latest Ref Range: 70 - 99 mg/dL 93   Calcium Latest Ref Range: 8.3 - 10.6 mg/dL 7.5 (L)   Phosphorus Latest Ref Range: 2.5 - 4.9 mg/dL 2.4 (L)   Troponin Latest Ref Range: <0.01 ng/mL <0.01   Albumin Latest Ref Range: 3.4 - 5.0 g/dL 2.1 (L)   WBC Latest Ref Range: 4.0 - 11.0 K/uL 7.7   RBC Latest Ref Range: 4.00 - 5.20 M/uL 3.73 (L)   Hemoglobin Quant Latest Ref Range: 12.0 - 16.0 g/dL 11.6 (L)   Hematocrit Latest Ref Range: 36.0 - 48.0 % 35.3 (L)   MCV Latest Ref Range: 80.0 - 100.0 fL 94.6   MCH Latest Ref Range: 26.0 - 34.0 pg 31.2   MCHC Latest Ref Range: 31.0 - 36.0 g/dL 33.0   MPV Latest Ref Range: 5.0 - 10.5 fL 8.8   RDW Latest Ref Range: 12.4 - 15.4 % 15.6 (H)   Platelet Count Latest Ref Range: 135 - 450 K/uL 284   Neutrophils % Latest Units: % 70.8   Lymphocyte % Latest Units: % 16.9   Monocytes % Latest Units: % 7.8   Eosinophils % Latest Units: % 4.2   Basophils % Latest Units: % 0.3   Neutrophils # Latest Ref Range: 1.7 - 7.7 K/uL 5.5   Lymphocytes # Latest Ref Range: 1.0 - 5.1 K/uL 1.3   Monocytes # Latest Ref Range: 0.0 - 1.3 K/uL 0.6   Eosinophils # Latest Ref Range: 0.0 - 0.6 K/uL 0.3   Basophils # Latest Ref Range: 0.0 - 0.2 K/uL 0.0   Labs resulted .      Sent perfect serve for K and Mag replacements   troponins 0.01     Will continue to monitor
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
## 2019-03-28 NOTE — PROGRESS NOTES
pressure ulcer stage    Malnutrition (HCC)    Dysphagia    Hypotension  Resolved Problems:    * No resolved hospital problems. *  esophageal ulcers.   Pathology NO malignancy  Esophageal stricture  gerd    Recommendations:       Anti reflux regimen as ordered   Discussed with beatriz Salinas MD  3/27/2019  9:00 PM   ICU [id number]

## 2019-03-28 NOTE — PROGRESS NOTES
ID Follow-up NOTE    CC:   Pelvic / sacral PU with osteomyelitis, Resp MRSA infection  Antibiotics: Vancomycin    Admit Date: 3/22/2019  Hospital Day: 7    Subjective:     Patient is anxious this morning, felt to be having a panic attack. Had been been on previously prescribed valium. Objective:     Patient Vitals for the past 8 hrs:   BP Temp Temp src Pulse Resp SpO2   03/28/19 1214 -- -- -- -- -- 96 %   03/28/19 1000 (!) 85/57 -- -- 81 17 97 %   03/28/19 0856 -- -- -- -- 23 98 %   03/28/19 0855 -- -- -- -- 22 97 %   03/28/19 0837 123/78 97.9 °F (36.6 °C) Oral -- -- --   03/28/19 0800 123/78 -- -- 78 20 97 %   03/28/19 0600 114/77 -- -- 83 22 95 %   03/28/19 0500 -- -- -- 92 25 95 %     I/O last 3 completed shifts: In: 2660 [P.O.:120; I.V.:2200; NG/GT:340]  Out: 3600 [Urine:3600]  No intake/output data recorded. EXAM:  GENERAL: No apparent distress.     HEENT: Membranes moist, no oral lesion  NECK:  Supple  LUNGS: Clear b/l, no rales, no dullness  CARDIAC: RRR, no murmur appreciated  ABD:  + BS, soft / NT  EXT:  No rash, no edema, no lesions  NEURO: No focal neurologic findings  PSYCH: Orientation, sensorium, mood normal  LINES:  Peripheral iv       Data Review:  Lab Results   Component Value Date    WBC 7.7 03/28/2019    HGB 11.6 (L) 03/28/2019    HCT 35.3 (L) 03/28/2019    MCV 94.6 03/28/2019     03/28/2019     Lab Results   Component Value Date    CREATININE <0.5 (L) 03/28/2019    BUN 10 03/28/2019     03/28/2019    K 3.3 (L) 03/28/2019     03/28/2019    CO2 26 03/28/2019       Hepatic Function Panel:   Lab Results   Component Value Date    ALKPHOS 147 03/25/2019    ALT 16 03/25/2019    AST 11 03/25/2019    PROT 6.0 03/25/2019    PROT 7.0 10/18/2012    BILITOT <0.2 03/25/2019    BILIDIR <0.2 03/25/2019    IBILI see below 03/25/2019    LABALBU 2.1 03/28/2019       Micro:  3/24 Bronch - Resp cult >100k cfu/ml MRSA  Staph aureus           Antibiotic Interpretation DANE Status   infection if present     Resp MRSA infection with heavy growth on BAL 3/22    Plan:     Cont vancomycin (avoid linezolid with high dose fluoxetine)  Wound care, offloading, nutritional support  Asked wound care RN to contact me at time of dressing change     Discussed with pt, medical resident  Mendel Alamin
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
## 2019-03-28 NOTE — PROGRESS NOTES
Patient complained of substernal chest pain. EKG, CXR and trop ordered. Patient later stated she is having smothering breathing issue rather than chest pain. Patient's oxygen requirement is the same. She seems more comfortable after conversion. EKG is largely unchanged from previous. Will continue to follow.

## 2019-03-28 NOTE — PROGRESS NOTES
Patient woke up and stated that she felt like she was gasping for air, and that she could not catch her breath. She stated that she felt as if there is a truck on her chest.  Becka served [place] residents to see patient. Patient also had BM, bathed and cleaned up patient few of her mepilex were soiled and changed. Residents came in door way with new orders for Chest x ray troponins and ekg. Made patient aware of new orders and patient then began very worried about having a chest x ray done she is worried that medicare will not pay and patient became very upset. Becka served residents to come back and speak with patient. Xray at bedside explained to patient that when she describes pain as a truck on her chest, its indicative of a heart attack and we have to make sure that she is not having any other issues. She was more agreeable to having xray and ekg done. RT did breathing tx with  Patient. @ 330 residents back at bedside and spoke with patient and looked over EKG     Labs sent.

## 2019-03-29 NOTE — PROGRESS NOTES
Patient transferred to [facility] via [place] [id number] [facility].  notified.

## 2019-03-29 NOTE — PROGRESS NOTES
GI Progress Note    Pham Zabala is a 70 y.o. female patient. 1. Panic attack    2. Infection    3. Adjustment insomnia    4. Nausea    5. Dysphagia  6. Esophageal ulcers  7.      Esophageal stricture    Admit Date: 3/22/2019    Subjective:       Still dependent on TF  No abdominal pain  Anxiety seems to have gotten better  No signs of gi bleed          Scheduled Meds:   diazepam  10 mg Oral QPM    mirtazapine  15 mg Oral Nightly    vancomycin  750 mg Intravenous Q18H    pantoprazole  40 mg Intravenous BID    sucralfate  1 g Oral TID AC    enoxaparin  30 mg Subcutaneous Daily    ipratropium-albuterol  1 ampule Inhalation Q4H WA    budesonide  0.5 mg Nebulization BID    nicotine  1 patch Transdermal Daily    sodium chloride flush  10 mL Intravenous 2 times per day    FLUoxetine  40 mg Per G Tube BID    folic acid  1 mg PEG Tube Lunch    gabapentin  600 mg PEG Tube TID    polyethylene glycol  17 g Per G Tube Daily    QUEtiapine  600 mg PEG Tube Nightly    tiZANidine  4 mg PEG Tube BID WC    phenytoin  100 mg PEG Tube TID       Continuous Infusions:   sodium chloride 100 mL/hr at 03/29/19 0906       PRN Meds:  albuterol, LORazepam, oxyCODONE, baclofen, sodium chloride flush, ondansetron, magnesium hydroxide, promethazine      Objective:       Patient Vitals for the past 24 hrs:   BP Temp Temp src Pulse Resp SpO2 Weight   03/29/19 1615 -- -- -- 82 25 92 % --   03/29/19 1600 (!) 146/71 -- -- 81 21 94 % --   03/29/19 1545 -- -- -- 80 19 94 % --   03/29/19 1530 -- -- -- 81 27 95 % --   03/29/19 1515 -- -- -- 79 30 94 % --   03/29/19 1500 -- -- -- 81 18 94 % --   03/29/19 1445 -- -- -- 81 29 95 % --   03/29/19 1430 -- -- -- 80 30 94 % --   03/29/19 1415 -- -- -- 82 27 94 % --   03/29/19 1400 103/62 -- -- 79 30 94 % --   03/29/19 1215 -- -- -- -- 18 94 % --   03/29/19 1200 107/77 -- -- 75 27 -- --   03/29/19 1000 107/71 -- -- 83 23 -- --   03/29/19 0832 -- -- -- -- 21 100 % --   03/29/19 2036 -- -- -- -- 23 94 % --   19 0801 (!) 117/92 -- -- 90 19 -- --   19 0600 (!) 142/86 -- -- 84 26 95 % --   19 0400 132/72 -- -- 84 24 94 % 102 lb (46.3 kg)   19 0300 -- -- -- 87 23 93 % --   19 0200 136/74 -- -- 89 25 95 % --   19 0100 -- -- -- 84 25 -- --   19 0000 99/60 98 °F (36.7 °C) Oral 87 26 -- --   19 2300 -- -- -- 92 25 -- --   19 2200 117/61 -- -- 86 27 -- --   19 2100 -- -- -- 83 24 -- --   19 103/67 98 °F (36.7 °C) Oral 80 24 95 % --   19 1830 -- -- -- 75 22 95 % --   19 1815 -- -- -- 77 18 91 % --       Exam:    VITALS:  BP (!) 146/71   Pulse 82   Temp 98 °F (36.7 °C) (Oral)   Resp 25   Ht 5' 4.17\" (1.63 m)   Wt 102 lb (46.3 kg)   SpO2 92%   BMI 17.41 kg/m²   TEMPERATURE:  Current - Temp: 98 °F (36.7 °C); Max - Temp  Av °F (36.7 °C)  Min: 98 °F (36.7 °C)  Max: 98 °F (36.7 °C)    NAD  General appearance: alert, appears stated age, cooperative and no distress  Head: Normocephalic, without obvious abnormality, atraumatic  Neck: supple, symmetrical, trachea midline and thyroid not enlarged, symmetric, no tenderness/mass/nodules  CVS:  RRR, Nl s1s2  Lungs CTA Bilaterally, normal effort  Abdomen: soft, non-tender; bowel sounds normal; no masses,  no organomegaly. Peg in good position  AAOx3, No asterixis or encephalopathy  Extremities: No edema. Lab Data:  Recent Labs     19  0500 19  0320 19  0415   WBC 6.8 7.7 8.3   HGB 10.7* 11.6* 10.9*   HCT 33.0* 35.3* 33.7*   MCV 94.9 94.6 95.3    284 299     Recent Labs     19  0500 19  0320 19  0415    141 139   K 3.4* 3.3* 3.8    106 104   CO2 25 26 27   PHOS 2.5 2.4* 3.1   BUN 15 10 8   CREATININE <0.5* <0.5* <0.5*     No results for input(s): AST, ALT, ALB, BILIDIR, BILITOT, ALKPHOS in the last 72 hours. No results for input(s): LIPASE, AMYLASE in the last 72 hours.   No results for input(s): PROT, INR in the last 72 hours. No results for input(s): PTT in the last 72 hours. No results for input(s): OCCULTBLD in the last 72 hours. Assessment:       Principal Problem:    Hypoxia  Active Problems:    Infection    Paraplegia (HCC)    S/P percutaneous endoscopic gastrostomy (PEG) tube placement (HCC)    History of anal cancer    Infected decubitus ulcer, unspecified pressure ulcer stage    Malnutrition (HCC)    Dysphagia    Hypotension  Resolved Problems:    * No resolved hospital problems.  *   esophageal stricture  Esophageal ulcerc  S/p peg    Recommendations:       Continue present regimen  egd in one month  Discussed with patient    Danish Cutler MD  3/29/2019  6:01 PM

## 2019-03-29 NOTE — PROGRESS NOTES
GI Progress Note    Pamela Lundborg is a 70 y.o. female patient. Acute respiratory failure  Dysphagia  Malnutrition  MRSA pneumonia  gerd  Esophageal stricture  Infected decubitus ulcers    Admit Date: 3/22/2019    Subjective: Tolerating full liquid and RF  No abdominal pain   Heartburn well controlled  Had SOB and chest pain. Troponin and EKG are negative. Likely anxiety or panic attack  Palliative care consulted.  Code status changed to [id number]          Scheduled Meds:   potassium phosphate (monobasic)  250 mg Oral BID    diazepam  10 mg Oral QPM    mirtazapine  15 mg Oral Nightly    vancomycin  750 mg Intravenous Q18H    pantoprazole  40 mg Intravenous BID    sucralfate  1 g Oral TID AC    enoxaparin  30 mg Subcutaneous Daily    ipratropium-albuterol  1 ampule Inhalation Q4H WA    budesonide  0.5 mg Nebulization BID    nicotine  1 patch Transdermal Daily    sodium chloride flush  10 mL Intravenous 2 times per day    FLUoxetine  40 mg Per G Tube BID    folic acid  1 mg PEG Tube Lunch    gabapentin  600 mg PEG Tube TID    polyethylene glycol  17 g Per G Tube Daily    QUEtiapine  600 mg PEG Tube Nightly    tiZANidine  4 mg PEG Tube BID WC    phenytoin  100 mg PEG Tube TID       Continuous Infusions:   sodium chloride 100 mL/hr at 03/28/19 1228       PRN Meds:  albuterol, LORazepam, oxyCODONE, baclofen, sodium chloride flush, ondansetron, magnesium hydroxide, promethazine      Objective:       Patient Vitals for the past 24 hrs:   BP Temp Temp src Pulse Resp SpO2 Weight   03/28/19 1830 -- -- -- 75 22 95 % --   03/28/19 1815 -- -- -- 77 18 91 % --   03/28/19 1800 96/68 -- -- 79 20 93 % --   03/28/19 1745 -- -- -- 85 18 96 % --   03/28/19 1730 -- -- -- 87 23 93 % --   03/28/19 1715 -- -- -- 81 23 93 % --   03/28/19 1700 -- -- -- 78 20 93 % --   03/28/19 1645 -- -- -- 76 20 100 % --   03/28/19 1638 -- -- -- -- 21 93 % --   03/28/19 1630 -- -- -- 79 25 94 % --   03/28/19 1615 -- -- -- 77 19 95 % --   19 1600 (!) 88/58 -- -- 78 23 95 % --   19 1545 -- -- -- 78 24 95 % --   19 1530 -- -- -- 72 15 94 % --   19 1515 -- -- -- 73 18 -- --   19 1430 -- -- -- 73 22 97 % --   19 1415 -- -- -- 73 26 95 % --   19 1400 94/60 -- -- 77 23 96 % --   19 1345 -- -- -- 77 25 95 % --   19 1330 -- -- -- 76 26 96 % --   19 1315 -- -- -- 75 26 96 % --   19 1300 -- -- -- 80 28 97 % --   19 1245 -- -- -- 76 24 97 % --   19 1230 -- -- -- 78 29 97 % --   19 1215 -- -- -- 79 24 -- --   19 1214 -- -- -- -- -- 96 % --   19 1200 (!) 81/52 -- -- 73 25 -- --   19 1145 -- -- -- 80 25 -- --   19 1130 -- -- -- 77 25 -- --   19 1000 (!) 85/57 -- -- 81 17 97 % --   19 0856 -- -- -- -- 23 98 % --   19 0855 -- -- -- -- 22 97 % --   19 0837 123 97.9 °F (36.6 °C) Oral -- -- -- --   19 0800 123/78 -- -- 78 20 97 % --   19 0600 114/77 -- -- 83 22 95 % --   19 0500 -- -- -- 92 25 95 % --   19 0400 122/80 98.6 °F (37 °C) Oral 87 26 95 % 104 lb 11.5 oz (47.5 kg)   19 0300 -- -- -- 93 26 -- --   19 0200 136/77 -- -- 90 25 92 % --   19 0100 -- -- -- 78 25 92 % --   19 0000 117/75 98 °F (36.7 °C) Oral 86 25 -- --   19 2300 -- -- -- 88 23 94 % --   19 2200 137/79 -- -- 93 24 95 % --       Exam:    VITALS:  BP 96/68   Pulse 75   Temp 97.9 °F (36.6 °C) (Oral)   Resp 22   Ht 5' 4.17\" (1.63 m)   Wt 104 lb 11.5 oz (47.5 kg)   SpO2 95%   BMI 17.88 kg/m²   TEMPERATURE:  Current - Temp: 97.9 °F (36.6 °C);  Max - Temp  Av.2 °F (36.8 °C)  Min: 97.9 °F (36.6 °C)  Max: 98.6 °F (37 °C)    NAD  General appearance: alert, appears stated age, cooperative but anxious  Head: Normocephalic, without obvious abnormality, atraumatic  Neck: supple, symmetrical, trachea midline and thyroid not enlarged, symmetric, no tenderness/mass/nodules  CVS:  RRR, Nl s1s2  Lungs CTA

## 2019-03-29 NOTE — CARE COORDINATION
Case Management Daily Note:    Current Plan of Care: IV Vanco, wound care        Discharge Plan: SNF    Tentative Discharge Date: TBD    Current Barriers to Discharge: wound care    Resources/Information given: N/A      Case Management Notes:     Accepted for admission at [facility].  is agreeable to plan.
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
## 2019-03-29 NOTE — DISCHARGE INSTR - COC
Continuity of Care Form    Patient Name: Gorge Vargas   :  1955  MRN:  [id number]    Admit date:  3/22/2019  Discharge date:  3/29/19    Code Status Order: DNR-CCA   Advance Directives:   Advance Care Flowsheet Documentation     Date/Time Healthcare Directive Type of Healthcare Directive Copy in [address] Agent's Name Healthcare Agent's Phone Number    [phone]  No, patient does not have an advance directive for healthcare treatment  Durable power of  for health care  No, copy requested from other (See comment)  Healthcare power of   Perry Crigler  [phone]          Admitting Physician:  Sonya Villagomez MD  PCP: Desi Felder MD    Discharging Nurse: [facility] Unit/Room#: 9236/8670-01  Discharging Unit Phone Number: [phone]    Emergency Contact:   Extended Emergency Contact Information  Primary Emergency Contact: Leana Rao  Address: [address] Tico BaezaAna Damian [address] [place] [address] Phone: [phone]  Work Phone: [phone]  Mobile Phone: [phone]  Relation: Spouse    Past Surgical History:  Past Surgical History:   Procedure Laterality Date    BACK SURGERY      x3 surgeries lumbar & 1 to cervical spine     BLADDER REPAIR      ruptured bladder after fell off of horse    CAROTID ENDARTERECTOMY      right    DEBRIDEMENT  10/1/2011    left groin and left hip debridement    FRACTURE SURGERY      karon. pelvic fx 1993, Rt femur fx repair 11    GASTROSTOMY TUBE PLACEMENT      HERNIA REPAIR      x2    LEG DEBRIDEMENT  11    non healing wounds left posterior leg    MASTECTOMY      bilateral    SKIN CANCER EXCISION  11    invasive SCC left groin    TONSILLECTOMY      UPPER GASTROINTESTINAL ENDOSCOPY N/A 3/26/2019    EGD BIOPSY GASTRIC OF GASTRITIS FOR H PYLORI performed by Arthur Tellez MD at [address] [address] Immunization History:   Immunization History   Administered Date(s) Administered    Influenza Virus Vaccine 10/18/2012, 10/04/2013, 12/03/2014, 11/18/2015    Influenza Whole 10/28/2008, 09/09/2010    Influenza, High Dose (Fluzone 65 yrs and older) 11/08/2018    Influenza, [place], 3 Years and older, IM (Fluzone 3 yrs and older or Afluria 5 yrs and older) 09/29/2016    Influenza, [place], 3 yrs and older, IM, PF (Fluzone 3 yrs and older or Afluria 5 yrs and older) 11/15/2017    Pneumococcal 13-valent Conjugate ([id number]) 12/01/2015    Pneumococcal Conjugate 7-valent 11/25/2008    Pneumococcal Polysaccharide ([id number]) 09/29/2016    Zoster Live (Zostavax) 05/14/2015       Active Problems:  Patient Active Problem List   Diagnosis Code    [facility] G82.20    Depression F32.9    Chronic pain syndrome G89.4    GERD (gastroesophageal reflux disease) K21.9    Urinary incontinence R32    Insomnia G47.00    Infection B99.9    Elevated liver enzymes     Anemia D64.9    Elevated blood sugar R73.9    Seizure disorder ([facility] G40.909    Smoker F17.200    Pain medication agreement signed Z02.89    Iron deficiency anemia due to dietary causes D50.8    S/P percutaneous endoscopic gastrostomy (PEG) tube placement ([facility]) Z93.1    Urge incontinence of urine N39.41    Pressure ulcer of coccygeal region, unspecified pressure ulcer stage L89.159    Pressure ulcer of heel, right, unstageable ([facility]) L89.610    Pure hypercholesterolemia E78.00    Recurrent major depression in partial remission ([facility] F33.41    Pressure injury of lower back, stage 3 ([facility]) L89.103    Pressure ulcer of coccygeal region, stage 4 ([facility]) L89.154    Pressure ulcer of left hip, stage 3 ([facility]) W57.282    History of anal cancer Z85.048    Infected decubitus ulcer, unspecified pressure ulcer stage L89.90, L08.9    Malnutrition ([facility]) E46    Hypoxia R09.02    SOB (shortness of breath) R06.02    Generalized pain R52    Nausea and vomiting R11.2    Goals of care, counseling/discussion Z71.89    DNR (do not resuscitate) discussion Z70.80    Encounter for palliative care Z51.5    Dysphagia R13.10    Hypotension I95.9       Isolation/Infection:   Isolation          Contact        Patient Infection Status     Infection Encounter Level? Onset Date Added Added By Resolved Resolved By Review Date    MRSA No 03/24/19 03/26/19 Respiratory Culture       C-diff (Clostridium difficile) (RETIRED) No  10/03/11 Orrin Riedel, RN       Old C-diff infection. Cannot resolve. Does not need to be in isolation.      MRSA No  10/03/11 Orrin Riedel, RN 03/22/19 July Flynn RN           Nurse Assessment:  Last Vital Signs: /71   Pulse 83   Temp 98 °F (36.7 °C) (Oral)   Resp 23   Ht 5' 4.17\" (1.63 m)   Wt 102 lb (46.3 kg)   SpO2 100%   BMI 17.41 kg/m²     Last documented pain score (0-10 scale): Pain Level: 0  Last Weight:   Wt Readings from Last 1 Encounters:   03/29/19 102 lb (46.3 kg)     Mental Status:  oriented    IV Access:  { BOYD IV ACCESS:060138383}    Nursing Mobility/ADLs:  Walking   {[facility] DME AVJX:219564217}  Transfer  {[facility] DME SGBP:300207333}  Bathing  {[facility] DME QNZR:594292822}  Dressing  {[facility] DME OMXB:101294040}  Toileting  {[facility] DME GKSA:432227117}  Feeding  {[facility] DME SMHN:332591103}  Med Admin  {[facility] DME HNAV:853154699}  Med Delivery   {[facility] MED Delivery:782675535}    Wound Care Documentation and Therapy:  Wound 03/08/19 Back Lower stage 4 pressure injury  (Active)   Wound Image   3/8/2019  9:04 AM   Wound Pressure Stage  4 3/25/2019  4:29 PM   Dressing Status Changed 3/29/2019  4:00 AM   Dressing Changed Dressing reinforced 3/29/2019  4:00 AM   Dressing/Treatment Other (comment) 3/29/2019  4:00 AM   Wound Cleansed Rinsed/Irrigated with saline 3/29/2019  4:00 AM   Dressing Change Due 03/30/19 3/29/2019  4:00 AM   Wound Length (cm) 3 cm 3/25/2019  3:00 PM   Wound Width (cm) 2 cm 3/25/2019  3:00 PM   Wound Depth (cm) 2 cm 3/25/2019  3:00 PM   Wound Surface Area (cm^2) 6 cm^2 3/25/2019  3:00 PM   Change in Wound Size % (l*w) 12.28 3/25/2019  3:00 PM   Wound Volume (cm^3) 12 cm^3 3/25/2019  3:00 PM   Wound Healing % -35 3/25/2019  3:00 PM   Post-Procedure Length (cm) 3.5 cm 3/22/2019  9:16 AM   Post-Procedure Width (cm) 2 cm 3/22/2019  9:16 AM   Post-Procedure Depth (cm) 1 cm 3/22/2019  9:16 AM   Post-Procedure Surface Area (cm^2) 7 cm^2 3/22/2019  9:16 AM   Post-Procedure Volume (cm^3) 7 cm^3 3/22/2019  9:16 AM   Distance Tunneling (cm) 0 cm 3/8/2019  9:04 AM   Tunneling Position ___ O'Clock 0 3/15/2019  8:20 AM   Undermining Starts ___ O'Clock 6:00 3/22/2019  8:42 AM   Undermining Ends___ O'Clock 1:00 3/22/2019  8:42 AM   Undermining Maxium Distance (cm) 1 @ 9:00 3/22/2019  8:42 AM   Wound Assessment Painful;Purple;Yellow 3/29/2019  4:00 AM   Drainage Amount Small 3/29/2019  4:00 AM   Drainage Description Purulent 3/29/2019  4:00 AM   Odor None 3/29/2019  4:00 AM   Margins Epibole (rolled edges); Unattached edges 3/25/2019  4:29 PM   Exposed structure Muscle 3/25/2019  3:00 PM   Laura-wound Assessment Maceration; Red 3/25/2019  4:29 PM   Non-staged Wound Description Full thickness 3/22/2019  8:42 AM   [place]%Wound Bed 80 3/22/2019  8:42 AM   Yellow%Wound Bed 20 3/22/2019  8:42 AM   Number of days: 21       Wound 03/08/19 Coccyx #2- pressure stage 4 (Active)   Wound Image   3/8/2019  9:06 AM   Wound Pressure Stage  4 3/25/2019  4:29 PM   Dressing Status Changed 3/29/2019  4:00 AM   Dressing Changed Changed/New 3/29/2019  4:00 AM   Dressing/Treatment Other (comment) 3/29/2019  4:00 AM   Wound Cleansed Rinsed/Irrigated with saline 3/29/2019  4:00 AM   Dressing Change Due 03/30/19 3/29/2019  4:00 AM   Wound Length (cm) 5 cm 3/25/2019  3:00 PM   Wound Width (cm) 4 cm 3/25/2019  3:00 PM   Wound Depth (cm) 0.3 cm 3/25/2019  3:00 PM   Wound Surface Area (cm^2) 20 cm^2 3/25/2019  3:00 PM   Change in Wound Size % (l*w) -14.29 3/25/2019  3:00 PM Wound Volume (cm^3) 6 cm^3 3/25/2019  3:00 PM   Wound Healing % 66 3/25/2019  3:00 PM   Post-Procedure Length (cm) 4.5 cm 3/22/2019  9:16 AM   Post-Procedure Width (cm) 4 cm 3/22/2019  9:16 AM   Post-Procedure Depth (cm) 0.8 cm 3/22/2019  9:16 AM   Post-Procedure Surface Area (cm^2) 18 cm^2 3/22/2019  9:16 AM   Post-Procedure Volume (cm^3) 14.4 cm^3 3/22/2019  9:16 AM   Undermining Starts ___ O'Clock 9 3/25/2019  3:00 PM   Undermining Ends___ O'Clock 3 3/25/2019  3:00 PM   Undermining Maxium Distance (cm) 3 3/25/2019  3:00 PM   Wound Assessment Yellow;Red;Painful 3/29/2019  4:00 AM   Drainage Amount Large 3/29/2019  4:00 AM   Drainage Description Purulent 3/29/2019  4:00 AM   Odor Mild 3/29/2019  4:00 AM   Margins Unattached edges 3/29/2019  4:00 AM   Exposed structure Muscle 3/25/2019  3:00 PM   Laura-wound Assessment Dry;Fragile; Excoriated;Red 3/25/2019  4:29 PM   Non-staged Wound Description Full thickness 3/22/2019  8:42 AM   [place]%Wound Bed 50 3/22/2019  8:42 AM   Red%Wound Bed 10 3/22/2019  8:42 AM   Yellow%Wound Bed 40 3/22/2019  8:42 AM   Number of days: 21       Wound 03/08/19 Hip Left #3- pressure stage 3 (Active)   Wound Image   3/8/2019  9:08 AM   Wound Pressure Stage  4 3/25/2019  4:29 PM   Dressing Status Changed 3/29/2019  4:00 AM   Dressing Changed Changed/New 3/29/2019  4:00 AM   Dressing/Treatment Alginate with Ag; Foam 3/29/2019  4:00 AM   Wound Cleansed Rinsed/Irrigated with saline 3/29/2019  4:00 AM   Dressing Change Due 03/30/19 3/29/2019  4:00 AM   Wound Length (cm) 1 cm 3/25/2019  3:00 PM   Wound Width (cm) 1 cm 3/25/2019  3:00 PM   Wound Depth (cm) 2 cm 3/25/2019  3:00 PM   Wound Surface Area (cm^2) 1 cm^2 3/25/2019  3:00 PM   Change in Wound Size % (l*w) -58.73 3/25/2019  3:00 PM   Wound Volume (cm^3) 2 cm^3 3/25/2019  3:00 PM   Wound Healing % -300 3/25/2019  3:00 PM   Post-Procedure Length (cm) 1 cm 3/22/2019  9:16 AM   Post-Procedure Width (cm) 0.6 cm 3/22/2019  9:16 AM   Post-Procedure Depth (cm) 1 cm 3/22/2019  9:16 AM   Post-Procedure Surface Area (cm^2) 0.6 cm^2 3/22/2019  9:16 AM   Post-Procedure Volume (cm^3) 0.6 cm^3 3/22/2019  9:16 AM   Undermining Starts ___ O'Clock 7 3/25/2019  3:00 PM   Undermining Ends___ O'Clock 9 3/25/2019  3:00 PM   Undermining Maxium Distance (cm) 3 3/25/2019  3:00 PM   Wound Assessment Drainage;Painful;Red;Yellow 3/25/2019  4:29 PM   Drainage Amount Copious 3/25/2019  3:00 PM   Drainage Description Foul purulent 3/25/2019  3:00 PM   Odor Mild 3/25/2019  4:29 PM   Margins Epibole (rolled edges) 3/25/2019  1:31 PM   Exposed structure Muscle 3/25/2019  3:00 PM   Laura-wound Assessment Maceration;Yellow-brown;Hyperpigmented;Pink 3/25/2019  3:00 PM   Non-staged Wound Description Full thickness 3/22/2019  8:30 AM   [place]%Wound Bed 50 3/22/2019  8:30 AM   Yellow%Wound Bed 20 3/15/2019  8:20 AM   Black%Wound Bed 50 3/22/2019  8:30 AM   Number of days: 21       Wound 03/08/19 Heel Left;Posterior healed former pressure injury with dry scabs, skin over calcaneous/hard (Active)   Wound Image   3/8/2019  9:02 AM   Wound Other 3/25/2019  4:29 PM   Dressing Status Clean;Dry; Intact 3/29/2019  4:00 AM   Dressing Changed Dressing reinforced 3/29/2019  4:00 AM   Dressing/Treatment Foam 3/29/2019  4:00 AM   Wound Cleansed Rinsed/Irrigated with saline 3/29/2019  4:00 AM   Dressing Change Due 03/25/19 3/25/2019  4:29 PM   Wound Length (cm) 0.5 cm 3/22/2019  8:42 AM   Wound Width (cm) 0.5 cm 3/22/2019  8:42 AM   Wound Depth (cm) 0.1 cm 3/22/2019  8:42 AM   Wound Surface Area (cm^2) 0.25 cm^2 3/22/2019  8:42 AM   Change in Wound Size % (l*w) 50 3/22/2019  8:42 AM   Wound Volume (cm^3) 0.02 cm^3 3/22/2019  8:42 AM   Wound Healing % 60 3/22/2019  8:42 AM   Post-Procedure Length (cm) 0.5 cm 3/22/2019  9:16 AM   Post-Procedure Width (cm) 0.5 cm 3/22/2019  9:16 AM   Post-Procedure Depth (cm) 0.1 cm 3/22/2019  9:16 AM   Post-Procedure Surface Area (cm^2) 0.25 cm^2 3/22/2019  9:16 AM Post-Procedure Volume (cm^3) 0.02 cm^3 3/22/2019  9:16 AM   Distance Tunneling (cm) 0 cm 3/22/2019  8:42 AM   Undermining Maxium Distance (cm) 0 3/22/2019  8:42 AM   Wound Assessment Purple;Brown;Dark edges;Dry 3/25/2019  4:29 PM   Drainage Amount None 3/25/2019  4:29 PM   Drainage Description VASU 3/24/2019 11:30 AM   Odor None 3/25/2019  4:29 PM   Margins Attached edges 3/25/2019  4:29 PM   Laura-wound Assessment Dry;Yellow-brown 3/25/2019  4:29 PM   Non-staged Wound Description Not applicable 9/28/8476  0:68 AM   Red%Wound Bed 0 3/22/2019  8:42 AM   Yellow%Wound Bed 0 3/22/2019  8:42 AM   Number of days: 21       Wound 03/25/19 Back Proximal;Mid DTI (Active)   Wound Deep tissue/Injury 3/25/2019  3:00 PM   Dressing Status Changed 3/29/2019  4:00 AM   Dressing Changed Changed/New 3/29/2019  4:00 AM   Dressing/Treatment Alginate with Ag; Foam 3/29/2019  4:00 AM   Wound Cleansed Rinsed/Irrigated with saline; Wound cleanser 3/29/2019  4:00 AM   Dressing Change Due 03/30/19 3/29/2019  4:00 AM   Wound Length (cm) 4 cm 3/25/2019  3:00 PM   Wound Width (cm) 1 cm 3/25/2019  3:00 PM   Wound Depth (cm) 0.1 cm 3/25/2019  3:00 PM   Wound Surface Area (cm^2) 4 cm^2 3/25/2019  3:00 PM   Wound Volume (cm^3) 0.4 cm^3 3/25/2019  3:00 PM   Wound Assessment Purple;Maroon 3/29/2019  4:00 AM   Drainage Amount Large 3/29/2019  4:00 AM   Drainage Description Purulent; Tan 3/29/2019  4:00 AM   Odor None 3/29/2019  4:00 AM   Margins Undefined edges 3/29/2019  4:00 AM   Laura-wound Assessment Yellow-brown 3/29/2019  4:00 AM   Purple%Wound Bed 100 3/25/2019  3:00 PM   Number of days: 3       Wound 03/25/19 [place] Right Unstageable pressure injury (Active)   Wound Pressure Unstageable 3/25/2019  3:00 PM   Dressing Status Changed 3/29/2019  4:00 AM   Dressing Changed Changed/New 3/29/2019  4:00 AM   Dressing/Treatment Alginate with Ag; Foam 3/29/2019  4:00 AM   Wound Cleansed Rinsed/Irrigated with saline 3/29/2019  4:00 AM   Dressing Change Due 03/30/19 3/29/2019  4:00 AM   Wound Length (cm) 0.7 cm 3/25/2019  3:00 PM   Wound Width (cm) 0.7 cm 3/25/2019  3:00 PM   Wound Depth (cm) 0.1 cm 3/25/2019  3:00 PM   Wound Surface Area (cm^2) 0.49 cm^2 3/25/2019  3:00 PM   Wound Volume (cm^3) 0.05 cm^3 3/25/2019  3:00 PM   Wound Assessment Red 3/25/2019  3:00 PM   Drainage Amount Small 3/29/2019  4:00 AM   Drainage Description Serosanguinous 3/29/2019  4:00 AM   Odor None 3/29/2019  4:00 AM   Margins Unattached edges 3/25/2019  3:00 PM   Laura-wound Assessment Yellow-brown;Hyperpigmented 3/25/2019  3:00 PM   Red%Wound Bed 100 3/25/2019  3:00 PM   Number of days: 3        Elimination:  Continence:   · Bowel: {YES / [place]:75333}  · Bladder: {YES / OB:48970}  Urinary Catheter: {Urinary Catheter:832558410}   Colostomy/Ileostomy/Ileal Conduit: {YES / LJ:35105}       Date of Last BM: ***    Intake/Output Summary (Last 24 hours) at 3/29/2019 1115  Last data filed at 3/29/2019 0854  Gross per 24 hour   Intake 9354.33 ml   Output 1775 ml   Net 7579.33 ml     I/O last 3 completed shifts: In: 9281.3 [P.O.:370;  I.V.:6728.3; NG/GT:2183]  Out: 3461 [Urine:1775]    Safety Concerns:     508 Handpressions Safety Concerns:943603797}    Impairments/Disabilities:      508 Handpressions Impairments/Disabilities:617523206}    Nutrition Therapy:  Current Nutrition Therapy:   508 Handpressions Diet List:190846163}    Routes of Feeding: {CHP DME Other Feedings:951729497}  Liquids: {Slp liquid thickness:33254}  Daily Fluid Restriction: {CHP DME Yes amt example:998542105}  Last Modified Barium Swallow with Video (Video Swallowing Test): {Done Not Done MSAV:773634665}    Treatments at the Time of Hospital Discharge:   Respiratory Treatments: ***  Oxygen Therapy:  {Therapy; copd oxygen:59970}  Ventilator:    { DAPHNEY Vent COBK:241675684}    Rehab Therapies: {THERAPEUTIC INTERVENTION:6895481793}  Weight Bearing Status/Restrictions: [address] Amy SHELLEY Weight Bearin}  Other Medical Equipment (for information only, NOT a DME order): PNA and sacral osteomyelitis (was not deemed surgical candidate for debridement)  Continue Valium 10 mg PO qHs for insomnia  Continue Ativan 0.5 mg PO TID for panic attacks   F/u GI in one-month for repeat EGD to re-evaluated GEJ ulcer and if esophageal sphincter dilatation possible. Physician Certification: I certify the above information and transfer of Gorge Vargas  is necessary for the continuing treatment of the diagnosis listed and that she requires [place] for less 30 days.      Update Admission H&P: No change in H&P    PHYSICIAN SIGNATURE:  Electronically signed by Ciara Solorio MD on 3/29/19 at 12:56 PM
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
## 2019-03-29 NOTE — PROGRESS NOTES
further dose adjustments and recommendations on changes in renal function, cultures, and clinical progress.       Zosyn 3.375g IV q8h  · Discontinued by RAJ Jhaveri., PharmD, [facility]  Phone: [phone]  3/29/2019 8:47 AM
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
## 2019-04-29 NOTE — TELEPHONE ENCOUNTER
Future Appointments   Date Time Provider Joe Ambriz   5/21/2019  8:20 MD LEONIDES [facility]   last ov 2/27/19    Phone: [phone]    Prescribed by nursing home/ she needs a refill

## 2019-05-01 NOTE — TELEPHONE ENCOUNTER
Team    Please let the patient know to hold other pain pills while using this liquid pain medication. Please tell she and her spouse to flush the enteral tube well with water after administering the dose.      Thanks,  Vicente Cabezas

## 2019-05-01 NOTE — TELEPHONE ENCOUNTER
Selma Miller stopped by office. Patient was discharged from [facility]. Selma Miller states patient has esophogeal ulcers and is unable to swallow her pills. Patient has tube placement and Selma Miller has to crush up her pills because of tube placement. Selma Miller states patient takes Oxycodone 15 mg. Selma Miller informed he has not given [facility] Oxycodone because he is afraid to crush the medication up and give to patient. Selma Miller is asking for liquid Oxycodone for patient.      Past appointment 2/27/19 with Dr. Travis Ramirez   Date Time Provider Joe Ambriz   5/21/2019  8:20 AM MD LEONIDES [facility]

## 2019-05-03 NOTE — ANESTHESIA POSTPROCEDURE EVALUATION
Department of Anesthesiology  Postprocedure Note    Patient: Meri England  MRN: [id number]  YOB: 1955  Date of evaluation: 5/3/2019  Time:  1:59 PM     Procedure Summary     Date:  05/03/19 Room / Location:  [facility] ENDO 03 / [facility] ENDOSCOPY    Anesthesia Start:  1003 Anesthesia Stop:  3709    Procedures:       EGD BIOPSY (N/A )      EGD DILATION BALLOON 12-15 MM CRE DILATOR (N/A ) Diagnosis:  (ESOPHAGEAL ULCER  K22.10)    Surgeon:  Avelina Anders MD Responsible Provider:  Margie Floyd MD    Anesthesia Type:  general ASA Status:  3          Anesthesia Type: No value filed. Anne Marie Phase I: Anne Marie Score: 10    Anne Marie Phase II: Anne Marie Score: 10    Last vitals: Reviewed and per EMR flowsheets.        Anesthesia Post Evaluation    Patient location during evaluation: PACU  Level of consciousness: awake and alert  Airway patency: patent  Nausea & Vomiting: no nausea and no vomiting  Complications: no  Cardiovascular status: blood pressure returned to baseline  Respiratory status: acceptable  Hydration status: euvolemic  Comments: Postoperative Anesthesia Note    Name:    Meri England  MRN:      [id number]    Patient Vitals in the past 12 hrs:  05/03/19 1130, BP:126/76, Pulse:84, Resp:24  05/03/19 1045, BP:103/67, Pulse:83, Resp:28  05/03/19 1033, BP:107/74, Pulse:83, Resp:24, SpO2:100 %  05/03/19 0917, BP:129/84, Temp:97.8 °F (36.6 °C), Temp src:Oral, Pulse:85, Resp:18, SpO2:94 %, Height:5' 4\" (1.626 m), Weight:104 lb (47.2 kg)     LABS:    CBC  Lab Results       Component                Value               Date/Time                  WBC                      8.3                 03/29/2019 04:15 AM        HGB                      10.9 (L)            03/29/2019 04:15 AM        HCT                      33.7 (L)            03/29/2019 04:15 AM        PLT                      299                 03/29/2019 04:15 AM   RENAL  Lab Results       Component                Value               Date/Time NA                       139                 03/29/2019 04:15 AM        K                        3.8                 03/29/2019 04:15 AM        K                        2.7 (LL)            03/25/2019 05:28 AM        CL                       104                 03/29/2019 04:15 AM        CO2                      27                  03/29/2019 04:15 AM        BUN                      8                   03/29/2019 04:15 AM        CREATININE               <0.5 (L)            03/29/2019 04:15 AM        GLUCOSE                  101 (H)             03/29/2019 04:15 AM   COAGS  Lab Results       Component                Value               Date/Time                  PROTIME                  13.2 (H)            10/13/2011 04:45 AM        INR                      1.20 (H)            10/13/2011 04:45 AM        APTT                     35.0 (H)            10/13/2011 04:45 AM     Intake & Output:  @28UZEY@    Nausea & Vomiting:  No    Level of Consciousness:  Awake    Pain Assessment:  Adequate analgesia    Anesthesia Complications:  No apparent anesthetic complications    SUMMARY      Vital signs stable  OK to discharge from Stage I post anesthesia care.   Care transferred from Anesthesiology department on discharge from perioperative area

## 2019-05-03 NOTE — TELEPHONE ENCOUNTER
Huma Cha with [employer] called. They received form back 05/02 for patient. However because of medicare guideline they need the signature to match the Physician on the form. They will send a fresh form, can we please just wait until Dr. Trev Fisher returns and have her sign.   They have made not that Dr. Trev Fisher returns 05/13

## 2019-05-03 NOTE — ANESTHESIA PRE PROCEDURE
Department of Anesthesiology  Preprocedure Note       Name:  Fan Villegas   Age:  70 y.o.  :  1955                                          MRN:  [id number]         Date:  5/3/2019      Surgeon: Scottie Montalvo):  Nayely Lacey MD    Procedure: EGD BIOPSY (N/A )    Medications prior to admission:   Prior to Admission medications    Medication Sig Start Date End Date Taking? Authorizing Provider   amoxicillin-clavulanate (AUGMENTIN) 250-125 MG per tablet Take 1 tablet by mouth 2 times daily   Yes Historical Provider, MD   oxyCODONE (ROXICODONE) 5 MG/5ML solution Take 5 mLs by mouth every 4 hours as needed for Pain for up to 5 days.  19 Yes ALBERTO Rodriguez CNP   sucralfate (CARAFATE) 1 GM/10ML suspension Take 10 mLs by mouth 3 times daily (before meals) 19  Yes ALBERTO Rodriguez CNP   mirtazapine (REMERON) 15 MG tablet Take 1 tablet by mouth nightly   Yes Cynthia Velasquez MD   ferrous sulfate 325 (65 Fe) MG tablet Take 325 mg by mouth Daily with lunch   Yes Historical Provider, MD   FLUoxetine (PROZAC) 40 MG capsule Take 40 mg by mouth 2 times daily   Yes Historical Provider, MD   QUEtiapine (SEROQUEL) 300 MG tablet Take 600 mg by mouth nightly    Yes Historical Provider, MD   Multiple Vitamins-Minerals (THERAPEUTIC MULTIVITAMIN-MINERALS) tablet Take 1 tablet by mouth daily   Yes Historical Provider, MD   phenytoin (DILANTIN) 100 MG ER capsule TAKE ONE CAPSULE BY MOUTH EVERY MORNING AND 2 CAPULES IN THE EVENING 3/8/19  Yes Tracy Cote MD   folic acid (FOLVITE) 1 MG tablet TAKE ONE TABLET BY MOUTH DAILY 3/8/19  Yes Tracy Cote MD   gabapentin (NEURONTIN) 600 MG tablet TAKE ONE TABLET BY MOUTH THREE TIMES A DAY 3/7/19 5/3/19 Yes Tracy Cote MD   baclofen (LIORESAL) 10 MG tablet Take 1 tablet by mouth 3 times daily 18  Yes Tracy Cote MD   tiZANidine (ZANAFLEX) 4 MG tablet Take 1 tablet by mouth 2 times daily 18  Yes Tracy Cote MD Iron deficiency anemia due to dietary causes D50.8    S/P percutaneous endoscopic gastrostomy (PEG) tube placement ([facility]) Z93.1    Urge incontinence of urine N39.41    Pressure ulcer of coccygeal region, unspecified pressure ulcer stage L89.159    Pressure ulcer of heel, right, unstageable ([facility]) L89.610    Pure hypercholesterolemia E78.00    Recurrent major depression in partial remission ([facility]) F33.41    Pressure injury of lower back, stage 3 ([facility]) L89.103    Pressure ulcer of coccygeal region, stage 4 ([facility]) L89.154    Pressure ulcer of left hip, stage 3 ([facility]) V88.012    History of anal cancer Z85.048    Infected decubitus ulcer, unspecified pressure ulcer stage L89.90, L08.9    Malnutrition ([facility]) E46    Hypoxia R09.02    SOB (shortness of breath) R06.02    Generalized pain R52    Nausea and vomiting R11.2    Goals of care, counseling/discussion Z71.89    DNR (do not resuscitate) discussion Z70.80    Encounter for palliative care Z51.5    Dysphagia R13.10    Hypotension I95.9       Past Medical History:        Diagnosis Date    Anxiety     Cancer ([facility] [address] 2006    karon.  breast CA     Chronic low back pain     Clostridium difficile infection 9/30/11    positive stool toxin    Greer catheter in place     G tube feedings ([facility])     GERD (gastroesophageal reflux disease)     MRSA (methicillin resistant staph aureus) culture positive 9/30/11; 3/24/19    groin wound; bronch    Muscle spasm     of lower legs, at times into diaphragm     Paraplegia ([facility] [address] 2/2007    [facility] (pulmonary embolism) 1993    Pressure ulcer     Psychiatric problem     depression     Pure hypercholesterolemia 11/11/2018    Seizure ([facility] [address] 8/21/11    to ER, no prior history       Past Surgical History:        Procedure Laterality Date    BACK SURGERY      x3 surgeries lumbar & 1 to cervical spine     BLADDER REPAIR      ruptured bladder after fell off of horse    CAROTID ENDARTERECTOMY      right    DEBRIDEMENT 10/1/2011    left groin and left hip debridement    FRACTURE SURGERY      karon. pelvic fx 1993, Rt femur fx repair 5/9/11    GASTROSTOMY TUBE PLACEMENT      HERNIA REPAIR      x2    LEG DEBRIDEMENT  4/21/11    non healing wounds left posterior leg    MASTECTOMY      bilateral    SKIN CANCER EXCISION  8/18/11    invasive SCC left groin    TONSILLECTOMY      UPPER GASTROINTESTINAL ENDOSCOPY N/A 3/26/2019    EGD BIOPSY GASTRIC OF GASTRITIS FOR H PYLORI performed by Chapo Alexander MD at [address] [address]         Social History:    Social History     Tobacco Use    Smoking status: Current Every Day Smoker     Packs/day: 1.50     Years: 40.00     Pack years: 60.00     Types: Cigarettes    Smokeless tobacco: Never Used   Substance Use Topics    Alcohol use: No     Alcohol/week: 0.0 oz                                Ready to quit: Not Answered  Counseling given: Not Answered      Vital Signs (Current):   Vitals:    05/03/19 0917 05/03/19 1033   BP: 129/84 107/74   Pulse: 85 83   Resp: 18 24   Temp: 97.8 °F (36.6 °C)    TempSrc: Oral    SpO2: 94% 100%   Weight: 104 lb (47.2 kg)    Height: 5' 4\" (1.626 m)                                               BP Readings from Last 3 Encounters:   05/03/19 107/74   05/03/19 97/81   03/29/19 (!) 141/76       NPO Status: Time of last liquid consumption: 2100                        Time of last solid consumption: 0100                        Date of last liquid consumption: 05/02/19                        Date of last solid food consumption: 05/02/19(tube feed)    BMI:   Wt Readings from Last 3 Encounters:   05/03/19 104 lb (47.2 kg)   03/29/19 102 lb (46.3 kg)   03/21/19 99 lb (44.9 kg)     Body mass index is 17.85 kg/m².     CBC:   Lab Results   Component Value Date    WBC 8.3 03/29/2019    RBC 3.54 03/29/2019    HGB 10.9 03/29/2019    HCT 33.7 03/29/2019    MCV 95.3 03/29/2019    RDW 16.0 03/29/2019     03/29/2019       CMP:   Lab Results Component Value Date     03/29/2019    K 3.8 03/29/2019    K 2.7 03/25/2019     03/29/2019    CO2 27 03/29/2019    BUN 8 03/29/2019    CREATININE <0.5 03/29/2019    GFRAA >60 03/29/2019    GFRAA >60 10/18/2012    AGRATIO 0.7 03/14/2019    LABGLOM >60 03/29/2019    LABGLOM 96 12/03/2014    GLUCOSE 101 03/29/2019    PROT 6.0 03/25/2019    PROT 7.0 10/18/2012    CALCIUM 7.3 03/29/2019    BILITOT <0.2 03/25/2019    ALKPHOS 147 03/25/2019    AST 11 03/25/2019    ALT 16 03/25/2019       POC Tests: No results for input(s): POCGLU, POCNA, POCK, POCCL, POCBUN, POCHEMO, POCHCT in the last 72 hours. Coags:   Lab Results   Component Value Date    PROTIME 13.2 10/13/2011    INR 1.20 10/13/2011    APTT 35.0 10/13/2011       HCG (If Applicable): No results found for: PREGTESTUR, PREGSERUM, HCG, HCGQUANT     ABGs:   Lab Results   Component Value Date    PHART 7.408 03/23/2019    PO2ART 74.1 03/23/2019    LCJ8RTW 39.0 03/23/2019    XGX3KGS 24 03/23/2019    BEART 0.1 03/23/2019    E0OTYMUT 95 03/23/2019        Type & Screen (If Applicable):  Lab Results   Component Value Date    LABABO A 10/08/2011    [address] Positive 10/08/2011       Anesthesia Evaluation    Airway: Mallampati: II  TM distance: >3 FB   Neck ROM: full  Mouth opening: > = 3 FB Dental:          Pulmonary:   (+) shortness of breath:                             Cardiovascular:                      Neuro/Psych:   (+) psychiatric history:            GI/Hepatic/Renal:   (+) GERD:,           Endo/Other:                     Abdominal:           Vascular:                                        Anesthesia Plan      general     ASA 3     (CRNA discussed with the patient the risks and benefits of PIV, general anesthesia, IV Narcotics, PACU. All questions were answered the patient agrees with the plan.)  Induction: intravenous. Anesthetic plan and risks discussed with patient. Plan discussed with CRNA.                   Ting Ventura MD 5/3/2019

## 2019-05-03 NOTE — PROGRESS NOTES
Romana Sousa is a 70 y.o. female patient. No current facility-administered medications for this encounter. No Known Allergies  Active Problems:    * No active hospital problems. *  Resolved Problems:    * No resolved hospital problems. *    Blood pressure 129/84, pulse 85, temperature 97.8 °F (36.6 °C), temperature source Oral, resp. rate 18, height 5' 4\" (1.626 m), weight 104 lb (47.2 kg), SpO2 94 %, not currently breastfeeding. Subjective:  Symptoms:  (Unable to swallow/eat). Diet:  NPO (Tube feeding). She reports  nausea. Activity level: Impaired due to weakness. Pain:  She complains of pain that is moderate. She reports pain is worsening. Pain is requiring pain medication. (Pressure ulcers). Objective:  General Appearance: In pain and ill-appearing. Vital signs: (most recent): Blood pressure 129/84, pulse 85, temperature 97.8 °F (36.6 °C), temperature source Oral, resp. rate 18, height 5' 4\" (1.626 m), weight 104 lb (47.2 kg), SpO2 94 %, not currently breastfeeding. Vital signs are normal.    Output: Producing urine (Greer) and producing stool. HEENT: Normal HEENT exam.    Lungs:  Normal effort and normal respiratory rate. Heart: Normal rate. Abdomen: Abdomen is soft. Bowel sounds are normal.   There is generalized tenderness. Extremities: Decreased range of motion. Pulses: Distal pulses are intact. Neurological: Patient is alert and oriented to person, place and time. Pupils:  Pupils are equal, round, and reactive to light. Skin:  Warm, dry and pale.       Assessment & Plan    Lenny Christensen RN  5/3/2019

## 2019-05-03 NOTE — H&P
History and Physical / Pre-Sedation Assessment    Patient:  Jewel Johnson   :   1955     Intended Procedure:  egd    HPI: FU large two esophageal ulcers to insure healing. Dysphagia to solid    Past Medical History:   has a past medical history of Anxiety, Cancer ([address] Chronic low back pain, Clostridium difficile infection, Greer catheter in place, G tube feedings ([address] GERD (gastroesophageal reflux disease), MRSA (methicillin resistant staph aureus) culture positive, Muscle spasm, Paraplegia ([address] PE (pulmonary embolism), Pressure ulcer, Psychiatric problem, Pure hypercholesterolemia, and Seizure ([address] copd     Past Surgical History:   has a past surgical history that includes Leg Debridement (11); Mastectomy; hernia repair; bladder repair; back surgery; Tonsillectomy; fracture surgery; Skin cancer excision (11); debridement (10/1/2011); Carotid endarterectomy; Gastrostomy tube placement; Vena Cava Filter Placement; and Upper gastrointestinal endoscopy (N/A, 3/26/2019). Medications:  Prior to Admission medications    Medication Sig Start Date End Date Taking? Authorizing Provider   amoxicillin-clavulanate (AUGMENTIN) 250-125 MG per tablet Take 1 tablet by mouth 2 times daily   Yes Historical Provider, MD   oxyCODONE (ROXICODONE) 5 MG/5ML solution Take 5 mLs by mouth every 4 hours as needed for Pain for up to 5 days.  19 Yes ALBERTO Oneil CNP   sucralfate (CARAFATE) 1 GM/10ML suspension Take 10 mLs by mouth 3 times daily (before meals) 19  Yes ALBERTO Oneil CNP   mirtazapine (REMERON) 15 MG tablet Take 1 tablet by mouth nightly 12  Yes Venita Beltran MD   ferrous sulfate 325 (65 Fe) MG tablet Take 325 mg by mouth Daily with lunch   Yes Historical Provider, MD   FLUoxetine (PROZAC) 40 MG capsule Take 40 mg by mouth 2 times daily   Yes Historical Provider, MD   QUEtiapine (SEROQUEL) 300 MG tablet Take 600 mg by mouth nightly    Yes Historical Provider, MD   Multiple Vitamins-Minerals (THERAPEUTIC MULTIVITAMIN-MINERALS) tablet Take 1 tablet by mouth daily   Yes Historical Provider, MD   phenytoin (DILANTIN) 100 MG ER capsule TAKE ONE CAPSULE BY MOUTH EVERY MORNING AND 2 CAPULES IN THE EVENING 3/8/19  Yes Colette Tapia MD   folic acid (FOLVITE) 1 MG tablet TAKE ONE TABLET BY MOUTH DAILY 3/8/19  Yes Colette Tapia MD   gabapentin (NEURONTIN) 600 MG tablet TAKE ONE TABLET BY MOUTH THREE TIMES A DAY 3/7/19 5/3/19 Yes Colette Tapia MD   baclofen (LIORESAL) 10 MG tablet Take 1 tablet by mouth 3 times daily 11/8/18  Yes Colette Tapia MD   tiZANidine (ZANAFLEX) 4 MG tablet Take 1 tablet by mouth 2 times daily 11/8/18  Yes Colette Tapia MD   oxyCODONE-acetaminophen (ROXICET) 5-325 MG/5ML solution Take 5 mLs by mouth every 12 hours as needed for Pain for up to 5 days. Take 5 ml per enteral tube every 12 hours as needed for pain. Flush enteral tube with water after dispensing pain.  medication. 5/1/19 5/6/19  ALBERTO Malone - CNP   albuterol (PROVENTIL) (2.5 MG/3ML) 0.083% nebulizer solution Take 3 mLs by nebulization every 6 hours as needed for Wheezing 0/97/69   Gabriella Simmons MD   budesonide (PULMICORT) 0.5 MG/2ML nebulizer suspension Take 2 mLs by nebulization 2 times daily 2/64/52   Gabriella Simmons MD   ipratropium-albuterol (DUONEB) 0.5-2.5 (3) MG/3ML SOLN nebulizer solution Inhale 3 mLs into the lungs every 4 hours (while awake) 9/14/13   Gabriella Simmons MD   pantoprazole (PROTONIX) 40 MG tablet Take 1 tablet by mouth 2 times daily (before meals) 8/69/82   Gabriella Simmons MD   oxyCODONE (OXYCONTIN) 15 MG T12A extended release tablet Take 1 tablet by mouth every 12 hours.     Historical Provider, MD Karmen Davey Oil [facility]) OINT ointment Apply topically as needed (bed sores / pressure ulcers)  6/8/18   Historical Provider, MD   polyethylene glycol (GLYCOLAX) packet Take 17 g by mouth daily as needed 6/10/18   Historical Provider, MD       [facility] History:  family history includes Depression in her mother; Hearing Loss in her father. Social History:   reports that she has been smoking cigarettes. She has a 60.00 pack-year smoking history. She has never used smokeless tobacco. She reports that she does not drink alcohol or use drugs. Allergies:  Patient has no known allergies. Nurses notes reviewed and agreed. Medications reviewed    Physical Exam:  Vital Signs: /67   Pulse 83   Temp 97.8 °F (36.6 °C) (Oral)   Resp 28   Ht 5' 4\" (1.626 m)   Wt 104 lb (47.2 kg)   SpO2 100% Comment: O2 off  BMI 17.85 kg/m²    Pulmonary:Normal  Cardiac:Normal  Abdomen:Normal    Pre-Procedure Assessment / Plan:  ASA 2 - Patient with mild systemic disease with no functional limitations  Mallampati Airway Assessment:  Mallampati Class I - (soft palate, fauces, uvula & anterior/posterior tonsillar pillars are visible)  Level of Sedation Plan: Moderate sedation  Post Procedure plan: Return to same level of care    I assessed the patient and find that the patient is in satisfactory condition to proceed with the planned procedure and sedation plan. I have explained the risk, benefits, and alternatives to the procedure. The patient understands and agrees to proceed.   Robert Lerma  11:55 AM 5/3/2019

## 2019-05-07 NOTE — TELEPHONE ENCOUNTER
Pt needs to have a follow up appt due to having a procedure on 05/03 to have esophagus expanded. Pt also requesting refill on med oxyCODONE (OXYCONTIN) 15 MG T12A extended release tablet . Pt's  would like to discuss continuing meds the pt was given for her procedure.   Please Advise

## 2019-05-07 NOTE — TELEPHONE ENCOUNTER
The last time, not to long ago, I had ordered her a liquid pain medication. Is she now taking pills? Or are they still using her J-tube?      Anirudh Mejía

## 2019-05-07 NOTE — TELEPHONE ENCOUNTER
She can eat some now, she did have a procedure on Friday and they state ulcers are healed. She is down to 98 lbs, She is on continuing feed right now. She is on soft food right now. In the hospital she was on omeprazole as well which helped a lot. Can you give that to her as well.

## 2019-05-08 NOTE — TELEPHONE ENCOUNTER
Claudio Loya spoke with [facility] and they have enough pain medication to last for 8 days so they will wait until they see Dr Nikunj Banerjee next week

## 2019-05-08 NOTE — TELEPHONE ENCOUNTER
Called pt and spk/w/her  and he said that they are requesting the change due to issue with care from the other agency. Are you giving verbal ok for the change you only stated call and give verbal order making sure it is clear.  Thank you

## 2019-05-08 NOTE — TELEPHONE ENCOUNTER
Pt's current  [facility] is being dismissed/cancelled [facility]. The current Home health care is needing a verbal order for pt to have a new home health care agency with [facility] [place]. Shedrick Burkitt can be reached @ [phone].

## 2019-05-08 NOTE — TELEPHONE ENCOUNTER
Who is canceling [facility]? The service or the patient/? Either way please call and give a verbal order from me for the other company.     Vicente Cabezas

## 2019-05-14 NOTE — PATIENT INSTRUCTIONS
Patient Education        Well Visit, Women 48 to 72: Care Instructions  Your Care Instructions    Physical exams can help you stay healthy. Your doctor has checked your overall health and may have suggested ways to take good care of yourself. He or she also may have recommended tests. At home, you can help prevent illness with healthy eating, regular exercise, and other steps. Follow-up care is a key part of your treatment and safety. Be sure to make and go to all appointments, and call your doctor if you are having problems. It's also a good idea to know your test results and keep a list of the medicines you take. How can you care for yourself at home? · Reach and stay at a healthy weight. This will lower your risk for many problems, such as obesity, diabetes, heart disease, and high blood pressure. · Get at least 30 minutes of exercise on most days of the week. Walking is a good choice. You also may want to do other activities, such as running, swimming, cycling, or playing tennis or team sports. · Do not smoke. Smoking can make health problems worse. If you need help quitting, talk to your doctor about stop-smoking programs and medicines. These can increase your chances of quitting for good. · Protect your skin from too much sun. When you're outdoors from 10 a.m. to 4 p.m., stay in the shade or cover up with clothing and a hat with a wide brim. Wear sunglasses that block UV rays. Even when it's cloudy, put broad-spectrum sunscreen (SPF 30 or higher) on any exposed skin. · See a dentist one or two times a year for checkups and to have your teeth cleaned. · Wear a seat belt in the car. Follow your doctor's advice about when to have certain tests. These tests can spot problems early. · Cholesterol. Your doctor will tell you how often to have this done based on your age, family history, or other things that can increase your risk for heart attack and stroke. · Blood pressure.  Have your blood pressure checked during a routine doctor visit. Your doctor will tell you how often to check your blood pressure based on your age, your blood pressure results, and other factors. · Mammogram. Ask your doctor how often you should have a mammogram, which is an X-ray of your breasts. A mammogram can spot breast cancer before it can be felt and when it is easiest to treat. · Pap test and pelvic exam. Ask your doctor how often you should have a Pap test. You may not need to have a Pap test as often as you used to. · Vision. Have your eyes checked every year or two or as often as your doctor suggests. Some experts recommend that you have yearly exams for glaucoma and other age-related eye problems starting at age 48. · Hearing. Tell your doctor if you notice any change in your hearing. You can have tests to find out how well you hear. · Diabetes. Ask your doctor whether you should have tests for diabetes. · Colorectal cancer. Your risk for colorectal cancer gets higher as you get older. Some experts say that adults should start regular screening at age 48 and stop at age 76. Others say to start before age 48 or continue after age 76. Talk with your doctor about your risk and when to start and stop screening. · Thyroid disease. Talk to your doctor about whether to have your thyroid checked as part of a regular physical exam. Women have an increased chance of a thyroid problem. · Osteoporosis. You should begin tests for bone density at age 72. If you are younger than 72, ask your doctor whether you have factors that may increase your risk for this disease. You may want to have this test before age 72. · Heart attack and stroke risk. At least every 4 to 6 years, you should have your risk for heart attack and stroke assessed. Your doctor uses factors such as your age, blood pressure, cholesterol, and whether you smoke or have diabetes to show what your risk for a heart attack or stroke is over the next 10 years.   When should download the FREE \"Exercise & Physical Activity: Your Everyday Guide\" from [employer]. Call 3-116.565.7370 or search [employer] online. · You need 5473-0875 mg of calcium and 8336-2379 IU of vitamin D per day. It is possible to meet your calcium requirement with diet alone, but a vitamin D supplement is usually necessary to meet this goal.  · When exposed to the sun, use a sunscreen that protects against both UVA and UVB radiation with an SPF of 30 or greater. Reapply every 2 to 3 hours or after sweating, drying off with a towel, or swimming. · Always wear a seat belt when traveling in a car. Always wear a helmet when riding a bicycle or motorcycle.

## 2019-05-14 NOTE — PROGRESS NOTES
Medicare Annual Wellness Visit  Name: Patricia Click Date: 2019   MRN: [id number] Sex: Female   Age: 70 y.o. Ethnicity: Non-/Non    : 1955 Race: Mariia York is here for Medicare AWV    Screenings for behavioral, psychosocial and functional/safety risks, and cognitive dysfunction are all negative except as indicated below. These results, as well as other patient data from the [address] form, are documented in Flowsheets linked to this Encounter. No Known Allergies  Prior to Visit Medications    Medication Sig Taking? Authorizing Provider   promethazine (PHENERGAN) 25 MG tablet Take 25 mg by mouth every 6 hours as needed for Nausea Yes Historical Provider, MD   ondansetron (ZOFRAN) 8 MG tablet Take 8 mg by mouth 2 times daily as needed for Nausea or Vomiting Yes Historical Provider, MD   ranitidine (ZANTAC) 150 MG capsule Take 150 mg by mouth 2 times daily Yes Historical Provider, MD   sucralfate (CARAFATE) 1 GM/10ML suspension Take 10 mLs by mouth 3 times daily (before meals) Yes Birdena Canavan, APRN - CNP   albuterol (PROVENTIL) (2.5 MG/3ML) 0.083% nebulizer solution Take 3 mLs by nebulization every 6 hours as needed for Wheezing Yes Tre Meier MD   budesonide (PULMICORT) 0.5 MG/2ML nebulizer suspension Take 2 mLs by nebulization 2 times daily Yes Tre Meier MD   ipratropium-albuterol (DUONEB) 0.5-2.5 (3) MG/3ML SOLN nebulizer solution Inhale 3 mLs into the lungs every 4 hours (while awake) Yes Tre Meier MD   oxyCODONE (OXYCONTIN) 15 MG T12A extended release tablet Take 1 tablet by mouth every 12 hours.  Yes Historical Provider, MD   ferrous sulfate 325 (65 Fe) MG tablet Take 220 mg by mouth Daily with lunch  Yes Historical Provider, MD   FLUoxetine (PROZAC) 40 MG capsule Take 40 mg by mouth 2 times daily Yes Historical Provider, MD   QUEtiapine (SEROQUEL) 300 MG tablet Take 400 mg by mouth nightly  Yes Historical Provider, MD Meron Beckham Peru-Castor Oil (VENELEX) OINT ointment Apply topically as needed (bed sores / pressure ulcers)  Yes Historical Provider, MD   Multiple Vitamins-Minerals (THERAPEUTIC MULTIVITAMIN-MINERALS) tablet Take 1 tablet by mouth daily Yes Historical Provider, MD   polyethylene glycol (GLYCOLAX) packet Take 17 g by mouth daily as needed Yes Historical Provider, MD   phenytoin (DILANTIN) 100 MG ER capsule TAKE ONE CAPSULE BY MOUTH EVERY MORNING AND 2 CAPULES IN THE EVENING Yes Savanna Parada MD   folic acid (FOLVITE) 1 MG tablet TAKE ONE TABLET BY MOUTH DAILY Yes Savanna Parada MD   baclofen (LIORESAL) 10 MG tablet Take 1 tablet by mouth 3 times daily Yes Savanna Parada MD   tiZANidine (ZANAFLEX) 4 MG tablet Take 1 tablet by mouth 2 times daily Yes Savanna Parada MD   gabapentin (NEURONTIN) 600 MG tablet TAKE ONE TABLET BY MOUTH THREE TIMES A DAY  Patient taking differently: 300 mg. Omayra Cano MD     Past Medical History:   Diagnosis Date    Anxiety     Cancer [facility]) 2006    karon.  breast CA     Chronic low back pain     Clostridium difficile infection 9/30/11    positive stool toxin    Greer catheter in place     G tube feedings (HCC)     GERD (gastroesophageal reflux disease)     MRSA (methicillin resistant staph aureus) culture positive 9/30/11; 3/24/19    groin wound; bronch    Muscle spasm     of lower legs, at times into diaphragm     Paraplegia ([address] 2/2007    from [address] [facility] PE (pulmonary embolism) 1993    Pressure ulcer     Psychiatric problem     depression     Pure hypercholesterolemia 11/11/2018    Seizure ([address] 8/21/11    to ER, no prior history     Past Surgical History:   Procedure Laterality Date    BACK SURGERY      x3 surgeries lumbar & 1 to cervical spine     BLADDER REPAIR      ruptured bladder after fell off of horse    CAROTID ENDARTERECTOMY      right    DEBRIDEMENT  10/1/2011    left groin and left hip debridement    FRACTURE SURGERY karon. pelvic fx 1993, Rt femur fx repair 5/9/11    GASTROSTOMY TUBE PLACEMENT      HERNIA REPAIR      x2    LEG DEBRIDEMENT  4/21/11    non healing wounds left posterior leg    MASTECTOMY      bilateral    SKIN CANCER EXCISION  8/18/11    invasive SCC left groin    TONSILLECTOMY      UPPER GASTROINTESTINAL ENDOSCOPY N/A 3/26/2019    EGD BIOPSY GASTRIC OF GASTRITIS FOR H PYLORI performed by Lila Gould MD at [address] 5/3/2019    EGD BIOPSY performed by Lila Gould MD at [address] [facility] N/A 5/3/2019    EGD DILATION BALLOON 12-15 MM CRE DILATOR performed by Lila Gould MD at [address]       Family History   Problem Relation Age of Onset    Depression Mother     Hearing Loss Father        CareTeam (Including outside providers/suppliers regularly involved in providing care):   Patient Care Team:  Taylor Naranjo MD as PCP - Charles Gorman MD as PCP - [facility] Attributed Provider  Barb Frausto MD as Consulting Physician (Electrophysiology)    Wt Readings from Last 3 Encounters:   05/14/19 107 lb (48.5 kg)   05/03/19 104 lb (47.2 kg)   03/29/19 102 lb (46.3 kg)     Vitals:    05/14/19 1619   BP: 94/64   Site: Left Upper Arm   Position: Sitting   Cuff Size: Medium Adult   Pulse: 78   SpO2: 97%   Weight: 107 lb (48.5 kg)   Height: 5' 4\" (1.626 m)     Body mass index is 18.37 kg/m². Based upon direct observation of the patient, evaluation of cognition reveals recent and remote memory intact. Patient's complete Health Risk Assessment and screening values have been reviewed and are found in Flowsheets. The following problems were reviewed today and where indicated follow up appointments were made and/or referrals ordered.     Positive Risk Factor Screenings with Interventions:     Substance Abuse:  Social History     Tobacco History     Smoking Status  Current Every Day Smoker Smoking Frequency  1.5 packs/day for 40 years (60 pk yrs) Smoking Tobacco Type  Cigarettes    Smokeless Tobacco Use  Never Used          Alcohol History     Alcohol Use Status  No          Drug Use     Drug Use Status  No          Sexual Activity     Sexually Active  Never               Audit Questionnaire: Screen for Alcohol Misuse  How often do you have a drink containing alcohol?: Never  Substance Abuse Interventions:  · Tobacco abuse:  patient is not ready to work toward tobacco cessation at this time    General Health:  General  In general, how would you say your health is?: (!) Poor  In the past 7 days, have you experienced any of the following? New or Increased Pain, New or Increased Fatigue, Loneliness, Social Isolation, Stress or Anger?: (!) New or Increased Pain, New or Increased Fatigue, Loneliness, Social Isolation, Stress  Do you get the social and emotional support that you need?: Yes  Do you have a Living Will?: Yes  General Health Risk Interventions:  · MED [address] Habits/Nutrition:  Health Habits/Nutrition  Do you exercise for at least 20 minutes 2-3 times per week?: (!) No  Have you lost any weight without trying in the past 3 months?: (!) Yes  Do you eat fewer than 2 meals per day?: (!) Yes  Have you seen a dentist within the past year?: (!) No  Body mass index is 18.37 kg/m². Health Habits/Nutrition Interventions:  · Getting nutrition thru her peg tube    Hearing/Vision:  Hearing/Vision  Do you or your family notice any trouble with your hearing?: (!) Yes  Do you have difficulty driving, watching TV, or doing any of your daily activities because of your eyesight?: No  Have you had an eye exam within the past year?: (!) No  Hearing/Vision Interventions:  · none    ADL:  ADLs  In the past 7 days, did you need help from others to perform any of the following everyday activities?  Eating, dressing, grooming, bathing, toileting, or walking/balance?: (!) Eating, Dressing, Grooming, Bathing, Toileting  In the past 7 days, did you need help from others to take care of any of the following? Laundry, housekeeping, banking/finances, shopping, telephone use, food preparation, transportation, or taking medications?: (!) Laundry, Housekeeping, Banking/Finances, Shopping, Transportation, Food Preparation, Taking Medications  ADL Interventions:  · Patient declines any further evaluation/treatment for this issue    Personalized Preventive Plan   Current Health Maintenance Status  Immunization History   Administered Date(s) Administered    Influenza Virus Vaccine 10/18/2012, 10/04/2013, 12/03/2014, 11/18/2015    Influenza Whole 10/28/2008, 09/09/2010    Influenza, High Dose (Fluzone 65 yrs and older) 11/08/2018    Influenza, [place] S[place], 3 Years and older, IM (Fluzone 3 yrs and older or Afluria 5 yrs and older) 09/29/2016    Influenza, [place] Sow, 3 yrs and older, IM, PF (Fluzone 3 yrs and older or Afluria 5 yrs and older) 11/15/2017    Pneumococcal 13-valent Conjugate ([id number]) 12/01/2015    Pneumococcal Conjugate 7-valent 11/25/2008    Pneumococcal Polysaccharide ([id number]) 09/29/2016    Zoster Live (Zostavax) 05/14/2015        Health Maintenance   Topic Date Due    Pneumococcal 65+ years Vaccine (2 of 2 - PPSV23) 09/29/2021    Flu vaccine  Completed    HIV screen  Addressed     Recommendations for Preventive Services Due: see orders and patient instructions/AVS.  . Recommended screening schedule for the next 5-10 years is provided to the patient in written form: see Patient Instructions/AVS.    Controlled Substances Monitoring:     RX Monitoring 5/18/2019   Attestation The Prescription Monitoring Report for this patient was reviewed today. Chronic Pain Routine Monitoring Possible medication side effects, risk of tolerance/dependence & alternative treatments discussed. ;Obtaining appropriate analgesic effect of treatment. ;No signs of potential drug abuse or diversion identified: otherwise, see note documentation   Chronic Pain > 50 MEDD Considered consultation with a specialist.;Obtained or confirmened \"Consent of Opioid Use\" on file. Chronic Pain > 80 MEDD Looked for signs of prescription misuse. Quality & Risk Score Accuracy    Visit Dx:  A79.540 - Pressure ulcer of coccygeal region, stage 4 (HCC)  Assessment and plan:  Improving based upon symptoms and exam. Continue current treatment plan and follow up at least yearly. Visit Dx:  E43 - Severe protein-calorie malnutrition ([address]  Assessment and plan:  Improving based upon symptoms and exam. Continue current treatment plan and follow up at least yearly. Last edited 19 15:37 EDT by Tracy Cote MD             Medicare Annual Wellness Visit  Name: Mandi Wilson Date: 2019   MRN: [id number] Sex: Female   Age: 70 y.o. Ethnicity: Non-/Non    : 1955 Race: Tc Chisholm is here for Medicare AWV    Screenings for behavioral, psychosocial and functional/safety risks, and cognitive dysfunction are all negative except as indicated below. These results, as well as other patient data from the [address] form, are documented in Flowsheets linked to this Encounter. No Known Allergies  Prior to Visit Medications    Medication Sig Taking? Authorizing Provider   promethazine (PHENERGAN) 25 MG tablet Take 25 mg by mouth every 6 hours as needed for Nausea Yes Historical Provider, MD   ondansetron (ZOFRAN) 8 MG tablet Take 8 mg by mouth 2 times daily as needed for Nausea or Vomiting Yes Historical Provider, MD   ranitidine (ZANTAC) 150 MG capsule Take 150 mg by mouth 2 times daily Yes Historical Provider, MD   oxyCODONE (OXYCONTIN) 15 MG T12A extended release tablet Take 1 tablet by mouth every 12 hours for 30 days.  Yes Tracy Cote MD   omeprazole (PRILOSEC) 20 MG delayed release capsule Take 1 capsule by mouth every morning (before breakfast) Yes Tracy Cote MD sucralfate (CARAFATE) 1 GM/10ML suspension Take 10 mLs by mouth 3 times daily (before meals) Yes ALBERTO Blakely - CNP   albuterol (PROVENTIL) (2.5 MG/3ML) 0.083% nebulizer solution Take 3 mLs by nebulization every 6 hours as needed for Wheezing Yes Sander Damon MD   budesonide (PULMICORT) 0.5 MG/2ML nebulizer suspension Take 2 mLs by nebulization 2 times daily Yes Sander Damon MD   ipratropium-albuterol (DUONEB) 0.5-2.5 (3) MG/3ML SOLN nebulizer solution Inhale 3 mLs into the lungs every 4 hours (while awake) Yes Sander Damon MD   ferrous sulfate 325 (65 Fe) MG tablet Take 220 mg by mouth Daily with lunch  Yes Historical Provider, MD   FLUoxetine (PROZAC) 40 MG capsule Take 40 mg by mouth 2 times daily Yes Historical Provider, MD   QUEtiapine (SEROQUEL) 300 MG tablet Take 400 mg by mouth nightly  Yes Historical Provider, MD   Balsam Peru-Castor Oil (VENELEX) OINT ointment Apply topically as needed (bed sores / pressure ulcers)  Yes Historical Provider, MD   Multiple Vitamins-Minerals (THERAPEUTIC MULTIVITAMIN-MINERALS) tablet Take 1 tablet by mouth daily Yes Historical Provider, MD   polyethylene glycol (GLYCOLAX) packet Take 17 g by mouth daily as needed Yes Historical Provider, MD   phenytoin (DILANTIN) 100 MG ER capsule TAKE ONE CAPSULE BY MOUTH EVERY MORNING AND 2 CAPULES IN THE EVENING Yes Ashwin Ballard MD   folic acid (FOLVITE) 1 MG tablet TAKE ONE TABLET BY MOUTH DAILY Yes Ashwin Ballard MD   baclofen (LIORESAL) 10 MG tablet Take 1 tablet by mouth 3 times daily Yes Ashwin Ballard MD   tiZANidine (ZANAFLEX) 4 MG tablet Take 1 tablet by mouth 2 times daily Yes Ashwin Ballard MD   gabapentin (NEURONTIN) 600 MG tablet TAKE ONE TABLET BY MOUTH THREE TIMES A DAY  Patient taking differently: 300 mg. Ursula Morales MD     Past Medical History:   Diagnosis Date    Anxiety     Cancer [facility]) 2006    karon.  breast CA     Chronic low back pain     Clostridium difficile infection 9/30/11    positive stool toxin    Greer catheter in place     G [address]ings [address]     GERD (gastroesophageal reflux disease)     MRSA (methicillin resistant staph aureus) culture positive 9/30/11; 3/24/19    groin wound; bronch    Muscle spasm     of lower legs, at times into diaphragm     Paraplegia ([address] 2/2007    from [address] [facility] PE (pulmonary embolism) 1993    Pressure ulcer     Psychiatric problem     depression     Pure hypercholesterolemia 11/11/2018    Seizure ([facility] 8/21/11    to ER, no prior history     Past Surgical History:   Procedure Laterality Date    BACK SURGERY      x3 surgeries lumbar & 1 to cervical spine     BLADDER REPAIR      ruptured bladder after fell off of horse    CAROTID ENDARTERECTOMY      right    DEBRIDEMENT  10/1/2011    left groin and left hip debridement    FRACTURE SURGERY      karon. pelvic fx 1993, Rt femur fx repair 5/9/11    GASTROSTOMY TUBE PLACEMENT      HERNIA REPAIR      x2    LEG DEBRIDEMENT  4/21/11    non healing wounds left posterior leg    MASTECTOMY      bilateral    SKIN CANCER EXCISION  8/18/11    invasive SCC left groin    TONSILLECTOMY      UPPER GASTROINTESTINAL ENDOSCOPY N/A 3/26/2019    EGD BIOPSY GASTRIC OF GASTRITIS FOR H PYLORI performed by Horacio Calle MD at [facility] N/A 5/3/2019    EGD BIOPSY performed by Horacio Calle MD at [facility] [place] N/A 5/3/2019    EGD DILATION BALLOON 12-15 MM CRE DILATOR performed by Horacio Calle MD at [place] [address]       Family History   Problem Relation Age of Onset    Depression Mother     Hearing Loss Father        CareTeam (Including outside providers/suppliers regularly involved in providing care):   Patient Care Team:  Vincent Gill MD as PCP - Arvind Dela Cruz MD as PCP - [facility] Attributed Provider  Jeanne Sadler MD as Consulting Physician (Electrophysiology)    Wt Readings from Last 3 Encounters:   05/14/19 107 lb (48.5 kg)   05/03/19 104 lb (47.2 kg)   03/29/19 102 lb (46.3 kg)     Vitals:    05/14/19 1619   BP: 94/64   Site: Left Upper Arm   Position: Sitting   Cuff Size: Medium Adult   Pulse: 78   SpO2: 97%   Weight: 107 lb (48.5 kg)   Height: 5' 4\" (1.626 m)     Body mass index is 18.37 kg/m². Based upon direct observation of the patient, evaluation of cognition reveals recent and remote memory intact. Patient's complete Health Risk Assessment and screening values have been reviewed and are found in Flowsheets. The following problems were reviewed today and where indicated follow up appointments were made and/or referrals ordered. Positive Risk Factor Screenings with Interventions:     Substance Abuse:  Social History     Tobacco History     Smoking Status  Current Every Day Smoker Smoking Frequency  1.5 packs/day for 40 years (60 pk yrs) Smoking Tobacco Type  Cigarettes    Smokeless Tobacco Use  Never Used          Alcohol History     Alcohol Use Status  No          Drug Use     Drug Use Status  No          Sexual Activity     Sexually Active  Never               Audit Questionnaire: Screen for Alcohol Misuse  How often do you have a drink containing alcohol?: Never  Substance Abuse Interventions:  · Tobacco abuse:  tobacco cessation tips and resources provided    General Health:  General  In general, how would you say your health is?: (!) Poor  In the past 7 days, have you experienced any of the following?  New or Increased Pain, New or Increased Fatigue, Loneliness, Social Isolation, Stress or Anger?: (!) New or Increased Pain, New or Increased Fatigue, Loneliness, Social Isolation, Stress  Do you get the social and emotional support that you need?: Yes  Do you have a Living Will?: Yes  General Health Risk Interventions:  · On tube feedings for supplement    Health Habits/Nutrition:  Health Habits/Nutrition  Do you exercise for at least 20 minutes 2-3 times per week?: (!) No  Have you lost any weight without trying in the past 3 months?: (!) Yes  Do you eat fewer than 2 meals per day?: (!) Yes  Have you seen a dentist within the past year?: (!) No  Body mass index is 18.37 kg/m². Health Habits/Nutrition Interventions:  · Gaining weight on tube feedings    Hearing/Vision:  Hearing/Vision  Do you or your family notice any trouble with your hearing?: (!) Yes  Do you have difficulty driving, watching TV, or doing any of your daily activities because of your eyesight?: No  Have you had an eye exam within the past year?: (!) No  Hearing/Vision Interventions:  · no    ADL:  ADLs  In the past 7 days, did you need help from others to perform any of the following everyday activities? Eating, dressing, grooming, bathing, toileting, or walking/balance?: (!) Eating, Dressing, Grooming, Bathing, Toileting  In the past 7 days, did you need help from others to take care of any of the following?  Laundry, housekeeping, banking/finances, shopping, telephone use, food preparation, transportation, or taking medications?: [employer], Housekeeping, Banking/Finances, Shopping, Transportation, Food Preparation, Taking Medications  ADL Interventions:  · Patient declines any further evaluation/treatment for this issue    Personalized Preventive Plan   Current Health Maintenance Status  Immunization History   Administered Date(s) Administered    Influenza Virus Vaccine 10/18/2012, 10/04/2013, 12/03/2014, 11/18/2015    Influenza Whole 10/28/2008, 09/09/2010    Influenza, High Dose (Fluzone 65 yrs and older) 11/08/2018    Influenza, [place], 3 Years and older, IM (Fluzone 3 yrs and older or Afluria 5 yrs and older) 09/29/2016    Influenza, [place], 3 yrs and older, IM, PF (Fluzone 3 yrs and older or Afluria 5 yrs and older) 11/15/2017    Pneumococcal 13-valent Conjugate ([id number]) 12/01/2015    Pneumococcal Conjugate 7-valent 11/25/2008    Pneumococcal Polysaccharide ([id number]) 09/29/2016    Zoster Live (Zostavax) 05/14/2015        Health Maintenance   Topic Date Due    Pneumococcal 65+ years Vaccine (2 of 2 - PPSV23) 09/29/2021    Flu vaccine  Completed    HIV screen  Addressed     Recommendations for Preventive Services Due: see orders and patient instructions/AVS.  .   Recommended screening schedule for the next 5-10 years is provided to the patient in written form: see Patient Instructions/AVS.

## 2019-05-29 NOTE — TELEPHONE ENCOUNTER
Patients  called and stated that Dr. Jaimie Clements had said she was going to put refills on the Augmentin for the patients chronic UTI because of her catheter. He is asking if the refill can be sent to [facility]. The medication is pending if you want to approve it.

## 2019-05-31 NOTE — TELEPHONE ENCOUNTER
I have ordered the xray. However if her symptoms worsen or do not improve in the near future she needs to go to the hospital for evaluation. Please have the nurse call back with full vital signs as well.

## 2019-05-31 NOTE — TELEPHONE ENCOUNTER
Nurse called about patient requesting a [employer] chest xray for the patient she is wheezing and labored breathing but her oxygen level is ok.  She is trying to avoid sending her to hospital.      Please fax to: [phone]

## 2019-06-05 NOTE — TELEPHONE ENCOUNTER
Patient was seen 06/05/19 by Nanci Kelly CNP.   She would like you to reach out to Osei Banks, patient's  to help facilitate the difference between palliative care and hospice

## 2019-06-05 NOTE — PROGRESS NOTES
HPI: Vandana Perez is a 70 y.o. female who presents for follow up on xrays that were done. Unfortunately a home xray company performed the xray and the office is calling for the result. She's had no fever or sputum production. She does have bilateral wheezes. Past Medical History:   Diagnosis Date    Anxiety     Cancer ([address] 2006    karon.  breast CA     Chronic low back pain     Clostridium difficile infection 9/30/11    positive stool toxin    Greer catheter in place     G tube feedings (HCC)     GERD (gastroesophageal reflux disease)     MRSA (methicillin resistant staph aureus) culture positive 9/30/11; 3/24/19    groin wound; bronch    Muscle spasm     of lower legs, at times into diaphragm     Paraplegia ([address] 2/2007    from [address] PE (pulmonary embolism) 1993    Pressure ulcer     Psychiatric problem     depression     Pure hypercholesterolemia 11/11/2018    Seizure ([facility] 8/21/11    to ER, no prior history       Past Surgical History:   Procedure Laterality Date    BACK SURGERY      x3 surgeries lumbar & 1 to cervical spine     BLADDER REPAIR      ruptured bladder after fell off of horse    CAROTID ENDARTERECTOMY      right    DEBRIDEMENT  10/1/2011    left groin and left hip debridement    FRACTURE SURGERY      karon. pelvic fx 1993, Rt femur fx repair 5/9/11    GASTROSTOMY TUBE PLACEMENT      HERNIA REPAIR      x2    LEG DEBRIDEMENT  4/21/11    non healing wounds left posterior leg    MASTECTOMY      bilateral    SKIN CANCER EXCISION  8/18/11    invasive SCC left groin    TONSILLECTOMY      UPPER GASTROINTESTINAL ENDOSCOPY N/A 3/26/2019    EGD BIOPSY GASTRIC OF GASTRITIS FOR H PYLORI performed by Yudith Mendosa MD at [address] [place] [address] 5/3/2019    EGD BIOPSY performed by Yudith Mendosa MD at [address] [place] [address] 5/3/2019    EGD DILATION BALLOON 12-15 MM CRE DILATOR performed by Yudith Mendosa MD at [place] [address]  VENA CAVA FILTER PLACEMENT         Social History     Tobacco Use    Smoking status: Current Every Day Smoker     Packs/day: 1.50     Years: 40.00     Pack years: 60.00     Types: Cigarettes    Smokeless tobacco: Never Used   Substance Use Topics    Alcohol use: No     Alcohol/week: 0.0 oz    Drug use: No       Family History   Problem Relation Age of Onset    Depression Mother     Hearing Loss Father        Review of Systems   Constitutional: Positive for fatigue. Negative for activity change, appetite change, chills, diaphoresis, fever and unexpected weight change. Patient has been very ill with declining health over the last 1 year: This has exacerbated over the past few months. She has a indwelling mendez catheter & PEG tube. She is frail with decreased circulation of the bilateral lower extremities. HENT: Negative for congestion, drooling, ear discharge, ear pain, facial swelling, mouth sores, nosebleeds, sneezing, trouble swallowing and voice change. Eyes: Negative for photophobia, pain, discharge, redness and visual disturbance. Respiratory: Positive for shortness of breath. Negative for apnea, cough, choking, chest tightness, wheezing and stridor. Cardiovascular: Negative for chest pain, palpitations and leg swelling. Gastrointestinal: Negative for abdominal distention, abdominal pain and rectal pain. Endocrine: Negative for polydipsia, polyphagia and polyuria. Genitourinary: Negative for difficulty urinating, flank pain and hematuria. Catheter     Musculoskeletal: Negative for arthralgias and gait problem. Skin: Negative for color change, rash and wound. Allergic/Immunologic: Negative for environmental allergies and immunocompromised state. Neurological: Negative for tremors, seizures, syncope, facial asymmetry, speech difficulty, weakness, light-headedness and numbness. Hematological: Does not bruise/bleed easily.    Psychiatric/Behavioral: Negative for 8 MG tablet Take 8 mg by mouth 2 times daily as needed for Nausea or Vomiting      ranitidine (ZANTAC) 150 MG capsule Take 150 mg by mouth 2 times daily      oxyCODONE (OXYCONTIN) 15 MG T12A extended release tablet Take 1 tablet by mouth every 12 hours for 30 days. 60 tablet 0    omeprazole (PRILOSEC) 20 MG delayed release capsule Take 1 capsule by mouth every morning (before breakfast) 90 capsule 2    sucralfate (CARAFATE) 1 GM/10ML suspension Take 10 mLs by mouth 3 times daily (before meals) 1200 mL 0    albuterol (PROVENTIL) (2.5 MG/3ML) 0.083% nebulizer solution Take 3 mLs by nebulization every 6 hours as needed for Wheezing 120 each 0    budesonide (PULMICORT) 0.5 MG/2ML nebulizer suspension Take 2 mLs by nebulization 2 times daily 60 ampule 0    ipratropium-albuterol (DUONEB) 0.5-2.5 (3) MG/3ML SOLN nebulizer solution Inhale 3 mLs into the lungs every 4 hours (while awake) 360 mL 0    ferrous sulfate 325 (65 Fe) MG tablet Take 220 mg by mouth Daily with lunch       FLUoxetine (PROZAC) 40 MG capsule Take 40 mg by mouth 2 times daily      QUEtiapine (SEROQUEL) 300 MG tablet Take 400 mg by mouth nightly       Balsam [place]-Castor Oil (VENELEX) OINT ointment Apply topically as needed (bed sores / pressure ulcers)       Multiple Vitamins-Minerals (THERAPEUTIC MULTIVITAMIN-MINERALS) tablet Take 1 tablet by mouth daily      polyethylene glycol (GLYCOLAX) packet Take 17 g by mouth daily as needed      phenytoin (DILANTIN) 100 MG ER capsule TAKE ONE CAPSULE BY MOUTH EVERY MORNING AND 2 CAPULES IN THE EVENING 270 capsule 2    folic acid (FOLVITE) 1 MG tablet TAKE ONE TABLET BY MOUTH DAILY 90 tablet 2    baclofen (LIORESAL) 10 MG tablet Take 1 tablet by mouth 3 times daily 270 tablet 1    tiZANidine (ZANAFLEX) 4 MG tablet Take 1 tablet by mouth 2 times daily 180 tablet 1     No facility-administered encounter medications on file as of 6/5/2019.           Stuart Puente CNP

## 2019-06-07 NOTE — CARE COORDINATION
RN ACC was asked by Essie Martinez CNP to contact the patient's  to discuss palliative care and hospice. Left a VM with RN ACC contact information. No future appointments. Nikolay JOHNSON, RN  Care Coordinator  [phone]  [email or web address]

## 2019-06-22 NOTE — TELEPHONE ENCOUNTER
Home health nurse Estrellita Diaz called regarding her chronic hip wound. States dr. Triny Gaming is aware and they have been working on healing it for some time. Appears infected,worried it would get worse, was treated with Keflex in the past with success. Rx for Keflex sent and instructed to follow-up in office next week.

## 2019-08-19 NOTE — TELEPHONE ENCOUNTER
----- Message from Josep Farley MD sent at 8/16/2019  4:35 PM EDT -----  Contact: Tammy Young to D/C Pat's pravastatin  , she does not need that med anymore since she is not eating much.

## 2019-09-06 NOTE — H&P
Michelle Connolly MD   ferrous sulfate 325 (65 Fe) MG tablet Take 220 mg by mouth Daily with lunch    Yes Historical Provider, MD   FLUoxetine (PROZAC) 40 MG capsule Take 40 mg by mouth 2 times daily   Yes Historical Provider, MD   QUEtiapine (SEROQUEL) 300 MG tablet Take 400 mg by mouth nightly    Yes Historical Provider, MD   Multiple Vitamins-Minerals (THERAPEUTIC MULTIVITAMIN-MINERALS) tablet Take 1 tablet by mouth daily   Yes Historical Provider, MD   polyethylene glycol (GLYCOLAX) packet Take 17 g by mouth daily as needed 6/10/18  Yes Historical Provider, MD   phenytoin (DILANTIN) 100 MG ER capsule TAKE ONE CAPSULE BY MOUTH EVERY MORNING AND 2 CAPULES IN THE EVENING 3/8/19  Yes Josep Farley MD   folic acid (FOLVITE) 1 MG tablet TAKE ONE TABLET BY MOUTH DAILY 3/8/19  Yes Josep Farley MD       Family History:  family history includes Depression in her mother; Hearing Loss in her father. Social History:   reports that she has been smoking cigarettes. She has a 60.00 pack-year smoking history. She has never used smokeless tobacco. She reports that she does not drink alcohol or use drugs. Allergies:  Patient has no known allergies. ROS:  twelve point system review was unremarkable except for above noted history. Nurses notes reviewed and agreed. Medications reviewed    Physical Exam:  Vital Signs: BP 90/61   Pulse 77   Temp 99 °F (37.2 °C) (Tympanic)   Resp 16   Ht 5' 4\" (1.626 m)   Wt 114 lb (51.7 kg)   LMP 09/06/2009   SpO2 (!) 82%   BMI 19.57 kg/m²    Skin: normal  HEENT: normal  Neck: supple. No adenopathy. No thyromegaly. No JVD. Pulmonary:Normal  Cardiac:Normal  Abdomen:Normal  MS: normal  Neuro: normal  Ext: no edema.  Pulses normal    Pre-Procedure Assessment / Plan:  ASA 2 - Patient with mild systemic disease with no functional limitations  Mallampati Airway Assessment:  Mallampati Class I - (soft palate, fauces, uvula & anterior/posterior tonsillar pillars are visible)  Level of Sedation Plan: Moderate sedation  Post Procedure plan: Return to same level of care    I assessed the patient and find that the patient is in satisfactory condition to proceed with the planned procedure and sedation plan. I have explained the risk, benefits, and alternatives to the procedure. The patient understands and agrees to proceed.   Baljit Ang  11:48 AM 9/6/2019

## (undated) DEVICE — ESOPHAGEAL BALLOON DILATATION CATHETER: Brand: CRE FIXED WIRE

## (undated) DEVICE — CATHETER DIL 6FR L180CM BLLN INFLATED 36-40.5-45FR L8CM ES

## (undated) DEVICE — FORCEPS BX L240CM DIA2.4MM L NDL RAD JAW 4 133340